# Patient Record
Sex: FEMALE | Race: WHITE | NOT HISPANIC OR LATINO | Employment: OTHER | URBAN - METROPOLITAN AREA
[De-identification: names, ages, dates, MRNs, and addresses within clinical notes are randomized per-mention and may not be internally consistent; named-entity substitution may affect disease eponyms.]

---

## 2017-04-11 ENCOUNTER — HOSPITAL ENCOUNTER (OUTPATIENT)
Dept: CARDIOLOGY | Facility: HOSPITAL | Age: 82
Discharge: HOME | End: 2017-04-11
Attending: INTERNAL MEDICINE | Admitting: INTERNAL MEDICINE

## 2017-09-28 ENCOUNTER — HOSPITAL ENCOUNTER (OUTPATIENT)
Dept: LAB | Facility: HOSPITAL | Age: 82
Discharge: HOME | End: 2017-09-28
Attending: FAMILY MEDICINE | Admitting: FAMILY MEDICINE

## 2017-10-31 ENCOUNTER — HOSPITAL ENCOUNTER (OUTPATIENT)
Dept: MAMMOGRAPHY | Facility: HOSPITAL | Age: 82
Discharge: HOME | End: 2017-10-31
Attending: FAMILY MEDICINE | Admitting: FAMILY MEDICINE

## 2018-02-27 ENCOUNTER — HOSPITAL ENCOUNTER (OUTPATIENT)
Dept: LAB | Facility: HOSPITAL | Age: 83
Setting detail: SPECIMEN
Discharge: HOME | End: 2018-02-27
Attending: FAMILY MEDICINE | Admitting: FAMILY MEDICINE

## 2018-02-28 ENCOUNTER — HOSPITAL ENCOUNTER (OUTPATIENT)
Dept: OTHER | Facility: HOSPITAL | Age: 83
Discharge: HOME | End: 2018-02-28
Attending: FAMILY MEDICINE | Admitting: FAMILY MEDICINE

## 2018-02-28 LAB
ALBUMIN SERPL-MCNC: 4 G/DL (ref 3.5–4.8)
ALBUMIN/GLOB SERPL: 1.5 {RATIO} (ref 1–1.7)
ALP SERPL-CCNC: 38 IU/L (ref 32–91)
ALT SERPL-CCNC: 16 IU/L (ref 14–54)
ANION GAP SERPL CALC-SCNC: 11.1 MMOL/L (ref 10–20)
AST SERPL-CCNC: 26 IU/L (ref 15–41)
BACTERIA SPEC AEROBE CULT: NORMAL
BILIRUB SERPL-MCNC: 0.8 MG/DL (ref 0.3–1.2)
BUN SERPL-MCNC: 18 MG/DL (ref 8–20)
BUN/CREAT SERPL: 15 (ref 5.4–26.2)
CALCIUM SERPL-MCNC: 9.1 MG/DL (ref 8.9–10.3)
CHLORIDE SERPL-SCNC: 104 MMOL/L (ref 101–111)
CHOLEST SERPL-MCNC: 205 MG/DL
CHOLEST/HDLC SERPL: 3.3 {RATIO}
CONV CO2: 27 MMOL/L (ref 22–32)
CONV LDL CHOLESTEROL DIRECT: 116 MG/DL (ref 0–100)
CONV TOTAL PROTEIN: 6.7 G/DL (ref 6.1–7.9)
CREAT UR-MCNC: 1.2 MG/DL (ref 0.4–1)
GLOBULIN UR ELPH-MCNC: 2.7 G/DL (ref 2.5–3.8)
GLUCOSE SERPL-MCNC: 94 MG/DL (ref 65–99)
HDLC SERPL-MCNC: 61 MG/DL
LDLC/HDLC SERPL: 1.9 {RATIO}
LIPID INTERPRETATION: ABNORMAL
Lab: NORMAL
MICRO REPORT STATUS: NORMAL
POTASSIUM SERPL-SCNC: 4.1 MMOL/L (ref 3.6–5.1)
SODIUM SERPL-SCNC: 138 MMOL/L (ref 136–144)
SPECIMEN SOURCE: NORMAL
TRIGL SERPL-MCNC: 167 MG/DL
VLDLC SERPL CALC-MCNC: 27.7 MG/DL

## 2018-04-24 ENCOUNTER — HOSPITAL ENCOUNTER (OUTPATIENT)
Dept: CARDIOLOGY | Facility: HOSPITAL | Age: 83
Discharge: HOME | End: 2018-04-24
Attending: INTERNAL MEDICINE | Admitting: INTERNAL MEDICINE

## 2018-09-05 ENCOUNTER — HOSPITAL ENCOUNTER (OUTPATIENT)
Dept: LAB | Facility: HOSPITAL | Age: 83
Discharge: HOME | End: 2018-09-05
Attending: FAMILY MEDICINE | Admitting: FAMILY MEDICINE

## 2018-09-05 LAB
ALBUMIN SERPL-MCNC: 3.9 G/DL (ref 3.5–4.8)
ALBUMIN/GLOB SERPL: 1.4 {RATIO} (ref 1–1.7)
ALP SERPL-CCNC: 39 IU/L (ref 32–91)
ALT SERPL-CCNC: 15 IU/L (ref 14–54)
ANION GAP SERPL CALC-SCNC: 14.3 MMOL/L (ref 10–20)
AST SERPL-CCNC: 26 IU/L (ref 15–41)
BASOPHILS # BLD AUTO: 0 10*3/UL (ref 0–0.2)
BASOPHILS NFR BLD AUTO: 1 % (ref 0–2)
BILIRUB SERPL-MCNC: 0.8 MG/DL (ref 0.3–1.2)
BUN SERPL-MCNC: 13 MG/DL (ref 8–20)
BUN/CREAT SERPL: 13 (ref 5.4–26.2)
CALCIUM SERPL-MCNC: 9.2 MG/DL (ref 8.9–10.3)
CHLORIDE SERPL-SCNC: 104 MMOL/L (ref 101–111)
CHOLEST SERPL-MCNC: 193 MG/DL
CHOLEST/HDLC SERPL: 3.2 {RATIO}
CONV CO2: 28 MMOL/L (ref 22–32)
CONV LDL CHOLESTEROL DIRECT: 106 MG/DL (ref 0–100)
CONV TOTAL PROTEIN: 6.7 G/DL (ref 6.1–7.9)
CREAT UR-MCNC: 1 MG/DL (ref 0.4–1)
DIFFERENTIAL METHOD BLD: (no result)
EOSINOPHIL # BLD AUTO: 0.1 10*3/UL (ref 0–0.3)
EOSINOPHIL # BLD AUTO: 2 % (ref 0–3)
ERYTHROCYTE [DISTWIDTH] IN BLOOD BY AUTOMATED COUNT: 14.7 % (ref 11.5–14.5)
GLOBULIN UR ELPH-MCNC: 2.8 G/DL (ref 2.5–3.8)
GLUCOSE SERPL-MCNC: 94 MG/DL (ref 65–99)
HCT VFR BLD AUTO: 36.1 % (ref 35–49)
HDLC SERPL-MCNC: 60 MG/DL
HGB BLD-MCNC: 11.9 G/DL (ref 12–15)
LDLC/HDLC SERPL: 1.8 {RATIO}
LIPID INTERPRETATION: ABNORMAL
LYMPHOCYTES # BLD AUTO: 1.5 10*3/UL (ref 0.8–4.8)
LYMPHOCYTES NFR BLD AUTO: 27 % (ref 18–42)
MCH RBC QN AUTO: 30.5 PG (ref 26–32)
MCHC RBC AUTO-ENTMCNC: 33.1 G/DL (ref 32–36)
MCV RBC AUTO: 92.3 FL (ref 80–94)
MONOCYTES # BLD AUTO: 0.5 10*3/UL (ref 0.1–1.3)
MONOCYTES NFR BLD AUTO: 9 % (ref 2–11)
NEUTROPHILS # BLD AUTO: 3.3 10*3/UL (ref 2.3–8.6)
NEUTROPHILS NFR BLD AUTO: 61 % (ref 50–75)
NRBC BLD AUTO-RTO: 0 /100{WBCS}
NRBC/RBC NFR BLD MANUAL: 0 10*3/UL
PLATELET # BLD AUTO: 215 10*3/UL (ref 150–450)
PMV BLD AUTO: 9.9 FL (ref 7.4–10.4)
POTASSIUM SERPL-SCNC: 4.3 MMOL/L (ref 3.6–5.1)
RBC # BLD AUTO: 3.91 10*6/UL (ref 4–5.4)
SODIUM SERPL-SCNC: 142 MMOL/L (ref 136–144)
TRIGL SERPL-MCNC: 133 MG/DL
VLDLC SERPL CALC-MCNC: 27.4 MG/DL
WBC # BLD AUTO: 5.4 10*3/UL (ref 4.5–11.5)

## 2019-04-12 ENCOUNTER — HOSPITAL ENCOUNTER (OUTPATIENT)
Dept: MAMMOGRAPHY | Facility: HOSPITAL | Age: 84
Discharge: HOME | End: 2019-04-12
Attending: NURSE PRACTITIONER | Admitting: NURSE PRACTITIONER

## 2019-08-07 ENCOUNTER — LAB (OUTPATIENT)
Dept: LAB | Facility: HOSPITAL | Age: 84
End: 2019-08-07

## 2019-08-07 ENCOUNTER — TRANSCRIBE ORDERS (OUTPATIENT)
Dept: LAB | Facility: HOSPITAL | Age: 84
End: 2019-08-07

## 2019-08-07 DIAGNOSIS — I10 PRIMARY HYPERTENSION: ICD-10-CM

## 2019-08-07 DIAGNOSIS — R09.89 ABNORMAL CHEST SOUNDS: ICD-10-CM

## 2019-08-07 DIAGNOSIS — E78.81 LIPOID DERMATOARTHRITIS: ICD-10-CM

## 2019-08-07 DIAGNOSIS — M85.80 OSTEOPENIA, UNSPECIFIED LOCATION: ICD-10-CM

## 2019-08-07 DIAGNOSIS — I21.3 MYOCARDIAL NECROSIS SYNDROME (HCC): ICD-10-CM

## 2019-08-07 DIAGNOSIS — M19.90 SENILE ARTHRITIS: ICD-10-CM

## 2019-08-07 DIAGNOSIS — E03.9 HYPOTHYROIDISM, UNSPECIFIED TYPE: Primary | ICD-10-CM

## 2019-08-07 DIAGNOSIS — E03.9 HYPOTHYROIDISM, UNSPECIFIED TYPE: ICD-10-CM

## 2019-08-07 LAB
25(OH)D3 SERPL-MCNC: 44.9 NG/ML (ref 30–100)
ALBUMIN SERPL-MCNC: 3.7 G/DL (ref 3.5–4.8)
ALBUMIN/GLOB SERPL: 1.4 G/DL (ref 1–1.7)
ALP SERPL-CCNC: 35 U/L (ref 32–91)
ALT SERPL W P-5'-P-CCNC: 14 U/L (ref 14–54)
ANION GAP SERPL CALCULATED.3IONS-SCNC: 13.1 MMOL/L (ref 5–15)
ARTICHOKE IGE QN: 112 MG/DL (ref 0–100)
AST SERPL-CCNC: 22 U/L (ref 15–41)
BASOPHILS # BLD AUTO: 0.1 10*3/MM3 (ref 0–0.2)
BASOPHILS NFR BLD AUTO: 1.7 % (ref 0–1.5)
BILIRUB SERPL-MCNC: 0.7 MG/DL (ref 0.3–1.2)
BUN BLD-MCNC: 13 MG/DL (ref 8–20)
BUN/CREAT SERPL: 11.8 (ref 5.4–26.2)
CALCIUM SPEC-SCNC: 8.6 MG/DL (ref 8.9–10.3)
CHLORIDE SERPL-SCNC: 103 MMOL/L (ref 101–111)
CHOLEST SERPL-MCNC: 190 MG/DL
CO2 SERPL-SCNC: 25 MMOL/L (ref 22–32)
CREAT BLD-MCNC: 1.1 MG/DL (ref 0.4–1)
DEPRECATED RDW RBC AUTO: 45.9 FL (ref 37–54)
EOSINOPHIL # BLD AUTO: 0.1 10*3/MM3 (ref 0–0.4)
EOSINOPHIL NFR BLD AUTO: 2.9 % (ref 0.3–6.2)
ERYTHROCYTE [DISTWIDTH] IN BLOOD BY AUTOMATED COUNT: 14.3 % (ref 12.3–15.4)
GFR SERPL CREATININE-BSD FRML MDRD: 48 ML/MIN/1.73
GLOBULIN UR ELPH-MCNC: 2.6 GM/DL (ref 2.5–3.8)
GLUCOSE BLD-MCNC: 97 MG/DL (ref 65–99)
HCT VFR BLD AUTO: 37.6 % (ref 34–46.6)
HDLC SERPL QL: 3.96
HDLC SERPL-MCNC: 48 MG/DL
HGB BLD-MCNC: 12.2 G/DL (ref 12–15.9)
LDLC/HDLC SERPL: 2.3 {RATIO}
LYMPHOCYTES # BLD AUTO: 1.9 10*3/MM3 (ref 0.7–3.1)
LYMPHOCYTES NFR BLD AUTO: 37.1 % (ref 19.6–45.3)
MCH RBC QN AUTO: 30 PG (ref 26.6–33)
MCHC RBC AUTO-ENTMCNC: 32.6 G/DL (ref 31.5–35.7)
MCV RBC AUTO: 91.9 FL (ref 79–97)
MONOCYTES # BLD AUTO: 0.6 10*3/MM3 (ref 0.1–0.9)
MONOCYTES NFR BLD AUTO: 12.2 % (ref 5–12)
NEUTROPHILS # BLD AUTO: 2.3 10*3/MM3 (ref 1.7–7)
NEUTROPHILS NFR BLD AUTO: 46.1 % (ref 42.7–76)
NRBC BLD AUTO-RTO: 0.2 /100 WBC (ref 0–0.2)
PLATELET # BLD AUTO: 218 10*3/MM3 (ref 140–450)
PMV BLD AUTO: 10.2 FL (ref 6–12)
POTASSIUM BLD-SCNC: 4.1 MMOL/L (ref 3.6–5.1)
PROT SERPL-MCNC: 6.3 G/DL (ref 6.1–7.9)
RBC # BLD AUTO: 4.09 10*6/MM3 (ref 3.77–5.28)
SODIUM BLD-SCNC: 137 MMOL/L (ref 136–144)
TRIGL SERPL-MCNC: 158 MG/DL
TSH SERPL DL<=0.05 MIU/L-ACNC: 0.44 MIU/ML (ref 0.34–5.6)
VLDLC SERPL-MCNC: 31.6 MG/DL
WBC NRBC COR # BLD: 5 10*3/MM3 (ref 3.4–10.8)

## 2019-08-07 PROCEDURE — 85025 COMPLETE CBC W/AUTO DIFF WBC: CPT | Performed by: FAMILY MEDICINE

## 2019-08-07 PROCEDURE — 80053 COMPREHEN METABOLIC PANEL: CPT | Performed by: FAMILY MEDICINE

## 2019-08-07 PROCEDURE — 80061 LIPID PANEL: CPT | Performed by: FAMILY MEDICINE

## 2019-08-07 PROCEDURE — 82306 VITAMIN D 25 HYDROXY: CPT | Performed by: FAMILY MEDICINE

## 2019-08-07 PROCEDURE — 36415 COLL VENOUS BLD VENIPUNCTURE: CPT

## 2019-08-07 PROCEDURE — 84443 ASSAY THYROID STIM HORMONE: CPT | Performed by: FAMILY MEDICINE

## 2019-12-20 PROBLEM — M81.0 OSTEOPOROSIS: Status: ACTIVE | Noted: 2019-12-20

## 2019-12-20 PROBLEM — J45.909 ASTHMA: Status: ACTIVE | Noted: 2019-12-20

## 2019-12-20 PROBLEM — E78.5 HYPERLIPIDEMIA: Status: ACTIVE | Noted: 2019-12-20

## 2019-12-20 PROBLEM — E03.9 HYPOTHYROIDISM: Status: ACTIVE | Noted: 2019-12-20

## 2019-12-20 PROBLEM — K21.9 GASTROESOPHAGEAL REFLUX DISEASE: Status: ACTIVE | Noted: 2019-12-20
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
## 2019-12-20 PROBLEM — I10 HYPERTENSION: Status: ACTIVE | Noted: 2019-12-20

## 2019-12-20 RX ORDER — TRAMADOL HYDROCHLORIDE 50 MG/1
TABLET ORAL
COMMUNITY
Start: 2019-04-08 | End: 2020-01-06

## 2020-01-06 ENCOUNTER — OFFICE VISIT (OUTPATIENT)
Dept: CARDIOLOGY | Facility: CLINIC | Age: 85
End: 2020-01-06

## 2020-01-06 VITALS
HEIGHT: 55 IN | SYSTOLIC BLOOD PRESSURE: 175 MMHG | DIASTOLIC BLOOD PRESSURE: 81 MMHG | BODY MASS INDEX: 28 KG/M2 | WEIGHT: 121 LBS | OXYGEN SATURATION: 99 % | HEART RATE: 61 BPM

## 2020-01-06 DIAGNOSIS — E78.2 MIXED HYPERLIPIDEMIA: Primary | ICD-10-CM

## 2020-01-06 DIAGNOSIS — I10 ESSENTIAL HYPERTENSION: ICD-10-CM

## 2020-01-06 DIAGNOSIS — Z98.61 STATUS POST PERCUTANEOUS TRANSLUMINAL CORONARY ANGIOPLASTY: ICD-10-CM

## 2020-01-06 DIAGNOSIS — E03.9 ACQUIRED HYPOTHYROIDISM: ICD-10-CM

## 2020-01-06 PROCEDURE — 99214 OFFICE O/P EST MOD 30 MIN: CPT | Performed by: INTERNAL MEDICINE

## 2020-01-06 PROCEDURE — 93000 ELECTROCARDIOGRAM COMPLETE: CPT | Performed by: INTERNAL MEDICINE

## 2020-01-06 RX ORDER — RALOXIFENE HYDROCHLORIDE 60 MG/1
1 TABLET, FILM COATED ORAL DAILY
COMMUNITY
Start: 2019-11-23

## 2020-01-06 NOTE — PROGRESS NOTES
Encounter Date:01/06/2020  Last seen 6/12/2019    Patient ID: Sol Milton is a 84 y.o. female.    Chief Complaint:  Status post stent  Hypertension  Hypothyroidism      History of Present Illness  Since I have last seen, the patient has been without any chest discomfort ,shortness of breath, palpitations, dizziness or syncope.  Denies having any headache ,abdominal pain ,nausea, vomiting , diarrhea constipation, loss of weight or loss of appetite.  Denies having any excessive bruising ,hematuria or blood in the stool.    Review of all systems negative except as indicated    Assessment and Plan       ////////////////////////  Impression  -------------   - status post stent to RCA for totally occluded vessel level in 10/2009.       History of proximal inferior wall hypokinesis with ejection fraction of 55-60%.  60% mid LAD distal to diagonal branch 50-60% mid circumflex 60% ostial marginal and 60% distal circumflex coronary artery disease.      stress Cardiolite test 04/24/2018 showed mild apical infarction without ischemia     -hypothyroidism hypertension GERD and asthma     -allergy to penicillin     -family history of coronary artery disease.  -----------------    Plan  ------------  Patient is not having any angina pectoris or congestive heart failure.   EKG showed sinus rhythm without any ischemic changes  Medications were reviewed and updated.  Follow-up in the office in  6 months  ///////////           Diagnosis Plan   1. Mixed hyperlipidemia  ECG 12 Lead   2. Essential hypertension  ECG 12 Lead   3. Acquired hypothyroidism  ECG 12 Lead   4. Status post percutaneous transluminal coronary angioplasty  ECG 12 Lead   LAB RESULTS (LAST 7 DAYS)    CBC        BMP        CMP         BNP        TROPONIN        CoAg        Creatinine Clearance  CrCl cannot be calculated (Patient's most recent lab result is older than the maximum 30 days allowed.).    ABG        Radiology  No radiology results for the last  day                The following portions of the patient's history were reviewed and updated as appropriate: allergies, current medications, past family history, past medical history, past social history, past surgical history and problem list.    Review of Systems   Constitution: Negative for chills, fever and malaise/fatigue.   Cardiovascular: Negative for chest pain, dyspnea on exertion, leg swelling, palpitations and syncope.   Respiratory: Negative for shortness of breath.    Skin: Negative for rash.   Neurological: Negative for dizziness, light-headedness and numbness.         Current Outpatient Medications:   •  amLODIPine (NORVASC) 10 MG tablet, , Disp: , Rfl:   •  atenolol (TENORMIN) 25 MG tablet, , Disp: , Rfl:   •  Cholecalciferol (VITAMIN D3) 5000 units capsule capsule, VITAMIN D3 5000 UNIT CAPS, Disp: , Rfl:   •  clopidogrel (PLAVIX) 75 MG tablet, , Disp: , Rfl:   •  ezetimibe (ZETIA) 10 MG tablet, , Disp: , Rfl:   •  levothyroxine (SYNTHROID) 88 MCG tablet, SYNTHROID 88 MCG TABS, Disp: , Rfl:   •  Loratadine (CLARITIN) 10 MG capsule, CLARITIN 10 MG CAPS, Disp: , Rfl:   •  Multiple Vitamins-Minerals (MULTI VITAMIN/MINERALS) tablet, MULTI VITAMIN/MINERALS TABS, Disp: , Rfl:   •  raloxifene (EVISTA) 60 MG tablet, Take 1 tablet by mouth Daily., Disp: , Rfl:   •  triamcinolone (KENALOG) 0.1 % ointment, Apply  topically to the appropriate area as directed 2 (Two) Times a Day., Disp: 30 g, Rfl: 0    Allergies   Allergen Reactions   • Penicillin G Rash       No family history on file.    Past Surgical History:   Procedure Laterality Date   • CARDIAC SURGERY     • EYE SURGERY         Past Medical History:   Diagnosis Date   • Disease of thyroid gland    • Hyperlipidemia    • Hypertension        No family history on file.    Social History     Socioeconomic History   • Marital status:      Spouse name: Not on file   • Number of children: Not on file   • Years of education: Not on file   • Highest  "education level: Not on file   Tobacco Use   • Smoking status: Never Smoker   • Smokeless tobacco: Never Used   Substance and Sexual Activity   • Alcohol use: No     Frequency: Never           ECG 12 Lead  Date/Time: 1/6/2020 12:13 PM  Performed by: Valdo Atkins MD  Authorized by: Valdo Atkins MD   Comparison: compared with previous ECG   Similar to previous ECG  Comments: Normal sinus rhythm premature atrial contractions 60/min nonspecific ST-T wave changes normal axis normal intervals no other ectopy.  No significant change from previous EKG              Objective:       Physical Exam    /81 (BP Location: Left arm, Patient Position: Sitting, Cuff Size: Adult)   Pulse 61   Ht 129.5 cm (51\")   Wt 54.9 kg (121 lb)   SpO2 99%   BMI 32.71 kg/m²   The patient is alert, oriented and in no distress.    Vital signs as noted above.    Head and neck revealed no carotid bruits or jugular venous distension.  No thyromegaly or lymphadenopathy is present.    Lungs clear.  No wheezing.  Breath sounds are normal bilaterally.    Heart normal first and second heart sounds.  No murmur..  No pericardial rub is present.  No gallop is present.    Abdomen soft and nontender.  No organomegaly is present.    Extremities revealed good peripheral pulses without any pedal edema.    Skin warm and dry.    Musculoskeletal system is grossly normal.    CNS grossly normal.        "

## 2020-06-12 ENCOUNTER — LAB (OUTPATIENT)
Dept: LAB | Facility: HOSPITAL | Age: 85
End: 2020-06-12

## 2020-06-12 ENCOUNTER — TRANSCRIBE ORDERS (OUTPATIENT)
Dept: LAB | Facility: HOSPITAL | Age: 85
End: 2020-06-12

## 2020-06-12 DIAGNOSIS — E55.9 VITAMIN D INSUFFICIENCY: ICD-10-CM

## 2020-06-12 DIAGNOSIS — E78.81 LIPOID DERMATOARTHRITIS: Primary | ICD-10-CM

## 2020-06-12 DIAGNOSIS — N81.89 OLD LACERATION OF MUSCLES OF PELVIC FLOOR: ICD-10-CM

## 2020-06-12 DIAGNOSIS — I25.10 DISEASE OF CARDIOVASCULAR SYSTEM: ICD-10-CM

## 2020-06-12 DIAGNOSIS — I10 HYPERTENSION, UNSPECIFIED TYPE: ICD-10-CM

## 2020-06-12 DIAGNOSIS — E78.81 LIPOID DERMATOARTHRITIS: ICD-10-CM

## 2020-06-12 LAB
25(OH)D3 SERPL-MCNC: 78 NG/ML (ref 30–100)
ALBUMIN SERPL-MCNC: 4 G/DL (ref 3.5–5.2)
ALBUMIN/GLOB SERPL: 1.4 G/DL
ALP SERPL-CCNC: 42 U/L (ref 39–117)
ALT SERPL W P-5'-P-CCNC: 11 U/L (ref 1–33)
ANION GAP SERPL CALCULATED.3IONS-SCNC: 8.1 MMOL/L (ref 5–15)
AST SERPL-CCNC: 22 U/L (ref 1–32)
BASOPHILS # BLD AUTO: 0.08 10*3/MM3 (ref 0–0.2)
BASOPHILS NFR BLD AUTO: 1.4 % (ref 0–1.5)
BILIRUB SERPL-MCNC: 0.4 MG/DL (ref 0.2–1.2)
BUN BLD-MCNC: 21 MG/DL (ref 8–23)
BUN/CREAT SERPL: 20 (ref 7–25)
CALCIUM SPEC-SCNC: 9.1 MG/DL (ref 8.6–10.5)
CHLORIDE SERPL-SCNC: 106 MMOL/L (ref 98–107)
CHOLEST SERPL-MCNC: 173 MG/DL (ref 0–200)
CO2 SERPL-SCNC: 26.9 MMOL/L (ref 22–29)
CREAT BLD-MCNC: 1.05 MG/DL (ref 0.57–1)
DEPRECATED RDW RBC AUTO: 45.6 FL (ref 37–54)
EOSINOPHIL # BLD AUTO: 0.19 10*3/MM3 (ref 0–0.4)
EOSINOPHIL NFR BLD AUTO: 3.2 % (ref 0.3–6.2)
ERYTHROCYTE [DISTWIDTH] IN BLOOD BY AUTOMATED COUNT: 13.6 % (ref 12.3–15.4)
GFR SERPL CREATININE-BSD FRML MDRD: 50 ML/MIN/1.73
GLOBULIN UR ELPH-MCNC: 2.8 GM/DL
GLUCOSE BLD-MCNC: 96 MG/DL (ref 65–99)
HCT VFR BLD AUTO: 36.1 % (ref 34–46.6)
HDLC SERPL-MCNC: 71 MG/DL (ref 40–60)
HGB BLD-MCNC: 11.9 G/DL (ref 12–15.9)
IMM GRANULOCYTES # BLD AUTO: 0.02 10*3/MM3 (ref 0–0.05)
IMM GRANULOCYTES NFR BLD AUTO: 0.3 % (ref 0–0.5)
LDLC SERPL CALC-MCNC: 88 MG/DL (ref 0–100)
LDLC/HDLC SERPL: 1.24 {RATIO}
LYMPHOCYTES # BLD AUTO: 2.01 10*3/MM3 (ref 0.7–3.1)
LYMPHOCYTES NFR BLD AUTO: 34.4 % (ref 19.6–45.3)
MCH RBC QN AUTO: 29.8 PG (ref 26.6–33)
MCHC RBC AUTO-ENTMCNC: 33 G/DL (ref 31.5–35.7)
MCV RBC AUTO: 90.3 FL (ref 79–97)
MONOCYTES # BLD AUTO: 0.62 10*3/MM3 (ref 0.1–0.9)
MONOCYTES NFR BLD AUTO: 10.6 % (ref 5–12)
NEUTROPHILS # BLD AUTO: 2.93 10*3/MM3 (ref 1.7–7)
NEUTROPHILS NFR BLD AUTO: 50.1 % (ref 42.7–76)
NRBC BLD AUTO-RTO: 0.2 /100 WBC (ref 0–0.2)
PLATELET # BLD AUTO: 217 10*3/MM3 (ref 140–450)
PMV BLD AUTO: 12.2 FL (ref 6–12)
POTASSIUM BLD-SCNC: 4.6 MMOL/L (ref 3.5–5.2)
PROT SERPL-MCNC: 6.8 G/DL (ref 6–8.5)
RBC # BLD AUTO: 4 10*6/MM3 (ref 3.77–5.28)
SODIUM BLD-SCNC: 141 MMOL/L (ref 136–145)
TRIGL SERPL-MCNC: 69 MG/DL (ref 0–150)
TSH SERPL DL<=0.05 MIU/L-ACNC: 0.26 UIU/ML (ref 0.27–4.2)
VLDLC SERPL-MCNC: 13.8 MG/DL (ref 5–40)
WBC NRBC COR # BLD: 5.85 10*3/MM3 (ref 3.4–10.8)

## 2020-06-12 PROCEDURE — 36415 COLL VENOUS BLD VENIPUNCTURE: CPT

## 2020-06-12 PROCEDURE — 80053 COMPREHEN METABOLIC PANEL: CPT

## 2020-06-12 PROCEDURE — 80061 LIPID PANEL: CPT

## 2020-06-12 PROCEDURE — 82306 VITAMIN D 25 HYDROXY: CPT

## 2020-06-12 PROCEDURE — 85025 COMPLETE CBC W/AUTO DIFF WBC: CPT

## 2020-06-12 PROCEDURE — 84443 ASSAY THYROID STIM HORMONE: CPT

## 2020-07-06 ENCOUNTER — OFFICE VISIT (OUTPATIENT)
Dept: CARDIOLOGY | Facility: CLINIC | Age: 85
End: 2020-07-06

## 2020-07-06 VITALS
SYSTOLIC BLOOD PRESSURE: 151 MMHG | BODY MASS INDEX: 27.89 KG/M2 | WEIGHT: 120.5 LBS | HEIGHT: 55 IN | DIASTOLIC BLOOD PRESSURE: 81 MMHG | OXYGEN SATURATION: 97 % | HEART RATE: 62 BPM

## 2020-07-06 DIAGNOSIS — Z95.820 STATUS POST ANGIOPLASTY WITH STENT: Primary | ICD-10-CM

## 2020-07-06 DIAGNOSIS — E03.9 ACQUIRED HYPOTHYROIDISM: ICD-10-CM

## 2020-07-06 DIAGNOSIS — I10 ESSENTIAL HYPERTENSION: ICD-10-CM

## 2020-07-06 DIAGNOSIS — E78.2 MIXED HYPERLIPIDEMIA: ICD-10-CM

## 2020-07-06 DIAGNOSIS — Z98.61 STATUS POST PERCUTANEOUS TRANSLUMINAL CORONARY ANGIOPLASTY: ICD-10-CM

## 2020-07-06 PROCEDURE — 99213 OFFICE O/P EST LOW 20 MIN: CPT | Performed by: INTERNAL MEDICINE

## 2020-07-06 PROCEDURE — 93000 ELECTROCARDIOGRAM COMPLETE: CPT | Performed by: INTERNAL MEDICINE

## 2020-07-06 NOTE — PROGRESS NOTES
Encounter Date:07/06/2020  Last seen 1/6/2020      Patient ID: Sol Milton is a 85 y.o. female.    Chief Complaint:  Status post stent  Hypertension  Hypothyroidism        History of Present Illness    Since I have last seen, the patient has been without any chest discomfort ,shortness of breath, palpitations, dizziness or syncope.  Denies having any headache ,abdominal pain ,nausea, vomiting , diarrhea constipation, loss of weight or loss of appetite.  Denies having any excessive bruising ,hematuria or blood in the stool.     Review of all systems negative except as indicated     Assessment and Plan         ////////////////////////  Impression  -------------   - status post stent to RCA for totally occluded vessel level in 10/2009.       History of proximal inferior wall hypokinesis with ejection fraction of 55-60%.  60% mid LAD distal to diagonal branch 50-60% mid circumflex 60% ostial marginal and 60% distal circumflex coronary artery disease.      stress Cardiolite test 04/24/2018 showed mild apical infarction without ischemia     -hypothyroidism hypertension GERD and asthma     -allergy to penicillin     -family history of coronary artery disease.  -----------------    Plan  ------------  Patient is not having any angina pectoris or congestive heart failure.  EKG showed sinus rhythm without any ischemic changes  Medications were reviewed and updated.  Follow-up in the office in  6 months.  Further plan will depend on patient's progress.  ///////////                   Diagnosis Plan   1. Status post angioplasty with stent  ECG 12 Lead   2. Mixed hyperlipidemia     3. Essential hypertension     4. Acquired hypothyroidism     5. Status post percutaneous transluminal coronary angioplasty     LAB RESULTS (LAST 7 DAYS)    CBC        BMP        CMP         BNP        TROPONIN        CoAg        Creatinine Clearance  Estimated Creatinine Clearance: 28 mL/min (A) (by C-G formula based on SCr of 1.05 mg/dL  (H)).    ABG        Radiology  No radiology results for the last day                The following portions of the patient's history were reviewed and updated as appropriate: allergies, current medications, past family history, past medical history, past social history, past surgical history and problem list.    Review of Systems   Constitution: Positive for malaise/fatigue.   Cardiovascular: Negative for chest pain, leg swelling, palpitations and syncope.   Respiratory: Negative for shortness of breath.    Skin: Negative for rash.   Gastrointestinal: Negative for nausea and vomiting.   Neurological: Negative for dizziness, light-headedness and numbness.         Current Outpatient Medications:   •  amLODIPine (NORVASC) 10 MG tablet, , Disp: , Rfl:   •  atenolol (TENORMIN) 25 MG tablet, , Disp: , Rfl:   •  Cholecalciferol (VITAMIN D3) 5000 units capsule capsule, VITAMIN D3 5000 UNIT CAPS, Disp: , Rfl:   •  clopidogrel (PLAVIX) 75 MG tablet, , Disp: , Rfl:   •  ezetimibe (ZETIA) 10 MG tablet, , Disp: , Rfl:   •  levothyroxine (SYNTHROID) 88 MCG tablet, SYNTHROID 88 MCG TABS, Disp: , Rfl:   •  Loratadine (CLARITIN) 10 MG capsule, CLARITIN 10 MG CAPS, Disp: , Rfl:   •  Multiple Vitamins-Minerals (MULTI VITAMIN/MINERALS) tablet, MULTI VITAMIN/MINERALS TABS, Disp: , Rfl:   •  raloxifene (EVISTA) 60 MG tablet, Take 1 tablet by mouth Daily., Disp: , Rfl:   •  triamcinolone (KENALOG) 0.1 % ointment, Apply  topically to the appropriate area as directed 2 (Two) Times a Day., Disp: 30 g, Rfl: 0    Allergies   Allergen Reactions   • Penicillin G Rash       History reviewed. No pertinent family history.    Past Surgical History:   Procedure Laterality Date   • CARDIAC SURGERY     • EYE SURGERY         Past Medical History:   Diagnosis Date   • Disease of thyroid gland    • Hyperlipidemia    • Hypertension        History reviewed. No pertinent family history.    Social History     Socioeconomic History   • Marital status:   "    Spouse name: Not on file   • Number of children: Not on file   • Years of education: Not on file   • Highest education level: Not on file   Tobacco Use   • Smoking status: Never Smoker   • Smokeless tobacco: Never Used   Substance and Sexual Activity   • Alcohol use: No     Frequency: Never           ECG 12 Lead  Date/Time: 7/6/2020 12:48 PM  Performed by: Valdo Atkins MD  Authorized by: Valdo Atkins MD   Comparison: compared with previous ECG   Similar to previous ECG  Comments: Normal sinus rhythm nonspecific ST-T wave changes premature atrial contractions 63/min normal axis normal intervals no change from 1/6/2020              Objective:       Physical Exam    /81   Pulse 62   Ht 129.5 cm (51\")   Wt 54.7 kg (120 lb 8 oz)   SpO2 97%   BMI 32.57 kg/m²   The patient is alert, oriented and in no distress.    Vital signs as noted above.    Head and neck revealed no carotid bruits or jugular venous distension.  No thyromegaly or lymphadenopathy is present.    Lungs clear.  No wheezing.  Breath sounds are normal bilaterally.    Heart normal first and second heart sounds.  No murmur..  No pericardial rub is present.  No gallop is present.    Abdomen soft and nontender.  No organomegaly is present.    Extremities revealed good peripheral pulses without any pedal edema.    Skin warm and dry.    Musculoskeletal system is grossly normal.    CNS grossly normal.        "

## 2021-01-19 ENCOUNTER — TRANSCRIBE ORDERS (OUTPATIENT)
Dept: LAB | Facility: HOSPITAL | Age: 86
End: 2021-01-19

## 2021-01-19 ENCOUNTER — LAB (OUTPATIENT)
Dept: LAB | Facility: HOSPITAL | Age: 86
End: 2021-01-19

## 2021-01-19 DIAGNOSIS — E55.9 VITAMIN D INSUFFICIENCY: ICD-10-CM

## 2021-01-19 DIAGNOSIS — E78.41 ELEVATED LIPOPROTEIN A LEVEL: Primary | ICD-10-CM

## 2021-01-19 DIAGNOSIS — N18.30 STAGE 3 CHRONIC KIDNEY DISEASE, UNSPECIFIED WHETHER STAGE 3A OR 3B CKD (HCC): ICD-10-CM

## 2021-01-19 DIAGNOSIS — I25.10 ATHEROSCLEROSIS OF NATIVE CORONARY ARTERY, ANGINA PRESENCE UNSPECIFIED, UNSPECIFIED WHETHER NATIVE OR TRANSPLANTED HEART: ICD-10-CM

## 2021-01-19 DIAGNOSIS — E78.81 LIPOID DERMATOARTHRITIS: Primary | ICD-10-CM

## 2021-01-19 DIAGNOSIS — I10 ESSENTIAL HYPERTENSION, MALIGNANT: ICD-10-CM

## 2021-01-19 DIAGNOSIS — M19.90 SENILE ARTHRITIS: ICD-10-CM

## 2021-01-19 DIAGNOSIS — E78.41 ELEVATED LIPOPROTEIN A LEVEL: ICD-10-CM

## 2021-01-19 LAB
25(OH)D3 SERPL-MCNC: 68.9 NG/ML (ref 30–100)
ALBUMIN SERPL-MCNC: 4.3 G/DL (ref 3.5–5.2)
ALBUMIN/GLOB SERPL: 1.3 G/DL
ALP SERPL-CCNC: 57 U/L (ref 39–117)
ALT SERPL W P-5'-P-CCNC: 14 U/L (ref 1–33)
ANION GAP SERPL CALCULATED.3IONS-SCNC: 9.6 MMOL/L (ref 5–15)
AST SERPL-CCNC: 22 U/L (ref 1–32)
BASOPHILS # BLD AUTO: 0.02 10*3/MM3 (ref 0–0.2)
BASOPHILS NFR BLD AUTO: 0.4 % (ref 0–1.5)
BILIRUB SERPL-MCNC: 0.6 MG/DL (ref 0–1.2)
BUN SERPL-MCNC: 13 MG/DL (ref 8–23)
BUN/CREAT SERPL: 18.1 (ref 7–25)
CALCIUM SPEC-SCNC: 9.3 MG/DL (ref 8.6–10.5)
CHLORIDE SERPL-SCNC: 100 MMOL/L (ref 98–107)
CHOLEST SERPL-MCNC: 153 MG/DL (ref 0–200)
CO2 SERPL-SCNC: 28.4 MMOL/L (ref 22–29)
CREAT SERPL-MCNC: 0.72 MG/DL (ref 0.57–1)
DEPRECATED RDW RBC AUTO: 47.2 FL (ref 37–54)
EOSINOPHIL # BLD AUTO: 0.07 10*3/MM3 (ref 0–0.4)
EOSINOPHIL NFR BLD AUTO: 1.3 % (ref 0.3–6.2)
ERYTHROCYTE [DISTWIDTH] IN BLOOD BY AUTOMATED COUNT: 13.8 % (ref 12.3–15.4)
GFR SERPL CREATININE-BSD FRML MDRD: 77 ML/MIN/1.73
GLOBULIN UR ELPH-MCNC: 3.3 GM/DL
GLUCOSE SERPL-MCNC: 97 MG/DL (ref 65–99)
HCT VFR BLD AUTO: 37.7 % (ref 34–46.6)
HDLC SERPL-MCNC: 70 MG/DL (ref 40–60)
HGB BLD-MCNC: 12.3 G/DL (ref 12–15.9)
IMM GRANULOCYTES # BLD AUTO: 0.05 10*3/MM3 (ref 0–0.05)
IMM GRANULOCYTES NFR BLD AUTO: 1 % (ref 0–0.5)
LDLC SERPL CALC-MCNC: 71 MG/DL (ref 0–100)
LDLC/HDLC SERPL: 1.02 {RATIO}
LYMPHOCYTES # BLD AUTO: 1.76 10*3/MM3 (ref 0.7–3.1)
LYMPHOCYTES NFR BLD AUTO: 33.8 % (ref 19.6–45.3)
MCH RBC QN AUTO: 30.4 PG (ref 26.6–33)
MCHC RBC AUTO-ENTMCNC: 32.6 G/DL (ref 31.5–35.7)
MCV RBC AUTO: 93.1 FL (ref 79–97)
MONOCYTES # BLD AUTO: 0.6 10*3/MM3 (ref 0.1–0.9)
MONOCYTES NFR BLD AUTO: 11.5 % (ref 5–12)
NEUTROPHILS NFR BLD AUTO: 2.7 10*3/MM3 (ref 1.7–7)
NEUTROPHILS NFR BLD AUTO: 52 % (ref 42.7–76)
NRBC BLD AUTO-RTO: 0 /100 WBC (ref 0–0.2)
PLATELET # BLD AUTO: 155 10*3/MM3 (ref 140–450)
PMV BLD AUTO: 11.6 FL (ref 6–12)
POTASSIUM SERPL-SCNC: 4.7 MMOL/L (ref 3.5–5.2)
PROT SERPL-MCNC: 7.6 G/DL (ref 6–8.5)
RBC # BLD AUTO: 4.05 10*6/MM3 (ref 3.77–5.28)
SODIUM SERPL-SCNC: 138 MMOL/L (ref 136–145)
TRIGL SERPL-MCNC: 57 MG/DL (ref 0–150)
TSH SERPL DL<=0.05 MIU/L-ACNC: 3.47 UIU/ML (ref 0.27–4.2)
VLDLC SERPL-MCNC: 12 MG/DL (ref 5–40)
WBC # BLD AUTO: 5.2 10*3/MM3 (ref 3.4–10.8)

## 2021-01-19 PROCEDURE — 36415 COLL VENOUS BLD VENIPUNCTURE: CPT

## 2021-01-19 PROCEDURE — 84443 ASSAY THYROID STIM HORMONE: CPT

## 2021-01-19 PROCEDURE — 82306 VITAMIN D 25 HYDROXY: CPT

## 2021-01-19 PROCEDURE — 85025 COMPLETE CBC W/AUTO DIFF WBC: CPT

## 2021-01-19 PROCEDURE — 80061 LIPID PANEL: CPT

## 2021-01-19 PROCEDURE — 80053 COMPREHEN METABOLIC PANEL: CPT

## 2021-03-09 ENCOUNTER — OFFICE VISIT (OUTPATIENT)
Dept: CARDIOLOGY | Facility: CLINIC | Age: 86
End: 2021-03-09

## 2021-03-09 VITALS
WEIGHT: 126 LBS | SYSTOLIC BLOOD PRESSURE: 148 MMHG | HEART RATE: 66 BPM | BODY MASS INDEX: 29.16 KG/M2 | DIASTOLIC BLOOD PRESSURE: 75 MMHG | OXYGEN SATURATION: 99 % | HEIGHT: 55 IN

## 2021-03-09 DIAGNOSIS — E78.2 MIXED HYPERLIPIDEMIA: ICD-10-CM

## 2021-03-09 DIAGNOSIS — Z95.820 STATUS POST ANGIOPLASTY WITH STENT: Primary | ICD-10-CM

## 2021-03-09 DIAGNOSIS — E03.9 ACQUIRED HYPOTHYROIDISM: ICD-10-CM

## 2021-03-09 DIAGNOSIS — I10 ESSENTIAL HYPERTENSION: ICD-10-CM

## 2021-03-09 PROCEDURE — 93000 ELECTROCARDIOGRAM COMPLETE: CPT | Performed by: INTERNAL MEDICINE

## 2021-03-09 PROCEDURE — 99214 OFFICE O/P EST MOD 30 MIN: CPT | Performed by: INTERNAL MEDICINE

## 2021-03-09 NOTE — PROGRESS NOTES
Encounter Date:03/09/2021  Last seen 7/6/2020      Patient ID: Sol Milton is a 86 y.o. female.    Chief Complaint:    Status post stent  Hypertension  Hypothyroidism        History of Present Illness  Mild fatigue.  Since I have last seen, the patient has been without any chest discomfort ,shortness of breath, palpitations, dizziness or syncope.  Denies having any headache ,abdominal pain ,nausea, vomiting , diarrhea constipation, loss of weight or loss of appetite.  Denies having any excessive bruising ,hematuria or blood in the stool.    Review of all systems negative except as indicated.    Reviewed ROS.  Assessment and Plan         ////////////////////////  Impression  -------------   - status post stent to RCA for totally occluded vessel level in 10/2009.       History of proximal inferior wall hypokinesis with ejection fraction of 55-60%.  60% mid LAD distal to diagonal branch 50-60% mid circumflex 60% ostial marginal and 60% distal circumflex coronary artery disease.      stress Cardiolite test 04/24/2018 showed mild apical infarction without ischemia     -hypothyroidism hypertension GERD and asthma     -allergy to penicillin     -family history of coronary artery disease.  -----------------    Plan  ------------  Status post stent  Patient is not having any angina pectoris or congestive heart failure.  EKG showed sinus rhythm without any ischemic changes.    Chronic coronary artery disease-stable  Continue Plavix    Hypertension-148/75  Continue atenolol amlodipine    Hypothyroidism-continue levothyroxine    Recent labs were reviewed and summarized-1/19/2021  Cholesterol 153 HDL 70 LDL 71 TSH is normal normal CBC and CMP.    Medications were reviewed and updated.  Follow-up in the office in  6 months.  Further plan will depend on patient's progress.  ///////////                    Diagnosis Plan   1. Status post angioplasty with stent  ECG 12 Lead   2. Mixed hyperlipidemia  ECG 12 Lead   3. Essential  hypertension  ECG 12 Lead   4. Acquired hypothyroidism  ECG 12 Lead   LAB RESULTS (LAST 7 DAYS)    CBC        BMP        CMP         BNP        TROPONIN        CoAg        Creatinine Clearance  CrCl cannot be calculated (Patient's most recent lab result is older than the maximum 30 days allowed.).    ABG        Radiology  No radiology results for the last day                The following portions of the patient's history were reviewed and updated as appropriate: allergies, current medications, past family history, past medical history, past social history, past surgical history and problem list.    Review of Systems   Constitutional: Positive for malaise/fatigue.   Cardiovascular: Negative for chest pain, leg swelling, palpitations and syncope.   Respiratory: Negative for shortness of breath.    Skin: Negative for rash.   Gastrointestinal: Negative for nausea and vomiting.   Neurological: Negative for dizziness, light-headedness and numbness.         Current Outpatient Medications:   •  amLODIPine (NORVASC) 10 MG tablet, , Disp: , Rfl:   •  atenolol (TENORMIN) 25 MG tablet, , Disp: , Rfl:   •  Cholecalciferol (VITAMIN D3) 5000 units capsule capsule, VITAMIN D3 5000 UNIT CAPS, Disp: , Rfl:   •  clopidogrel (PLAVIX) 75 MG tablet, , Disp: , Rfl:   •  ezetimibe (ZETIA) 10 MG tablet, , Disp: , Rfl:   •  levothyroxine (SYNTHROID) 88 MCG tablet, SYNTHROID 88 MCG TABS, Disp: , Rfl:   •  Loratadine (CLARITIN) 10 MG capsule, CLARITIN 10 MG CAPS, Disp: , Rfl:   •  Multiple Vitamins-Minerals (MULTI VITAMIN/MINERALS) tablet, MULTI VITAMIN/MINERALS TABS, Disp: , Rfl:   •  raloxifene (EVISTA) 60 MG tablet, Take 1 tablet by mouth Daily., Disp: , Rfl:   •  triamcinolone (KENALOG) 0.1 % ointment, Apply  topically to the appropriate area as directed 2 (Two) Times a Day., Disp: 30 g, Rfl: 0    Allergies   Allergen Reactions   • Penicillin G Rash       History reviewed. No pertinent family history.    Past Surgical History:   Procedure  "Laterality Date   • CARDIAC SURGERY     • EYE SURGERY         Past Medical History:   Diagnosis Date   • Disease of thyroid gland    • Hyperlipidemia    • Hypertension        History reviewed. No pertinent family history.    Social History     Socioeconomic History   • Marital status:      Spouse name: Not on file   • Number of children: Not on file   • Years of education: Not on file   • Highest education level: Not on file   Tobacco Use   • Smoking status: Never Smoker   • Smokeless tobacco: Never Used   Vaping Use   • Vaping Use: Never used   Substance and Sexual Activity   • Alcohol use: No           ECG 12 Lead    Date/Time: 3/9/2021 11:07 AM  Performed by: Valdo Atkins MD  Authorized by: Valdo Atkins MD   Comparison: compared with previous ECG   Similar to previous ECG  Comparison to previous ECG: Normal sinus rhythm nonspecific ST-T wave changes normal axis normal intervals no ectopy except for one premature atrial contraction.  63/min.  No significant change from 7/6/2020                Objective:       Physical Exam    /75 (BP Location: Left arm, Patient Position: Sitting, Cuff Size: Adult)   Pulse 66   Ht 129.5 cm (51\")   Wt 57.2 kg (126 lb)   SpO2 99%   BMI 34.06 kg/m²   The patient is alert, oriented and in no distress.    Vital signs as noted above.    Head and neck revealed no carotid bruits or jugular venous distension.  No thyromegaly or lymphadenopathy is present.    Lungs clear.  No wheezing.  Breath sounds are normal bilaterally.    Heart normal first and second heart sounds.  No murmur..  No pericardial rub is present.  No gallop is present.    Abdomen soft and nontender.  No organomegaly is present.    Extremities revealed good peripheral pulses without any pedal edema.    Skin warm and dry.    Musculoskeletal system is grossly normal.    CNS grossly normal.    Reviewed and unchanged from last visit.        "

## 2021-04-21 ENCOUNTER — TRANSCRIBE ORDERS (OUTPATIENT)
Dept: LAB | Facility: HOSPITAL | Age: 86
End: 2021-04-21

## 2021-04-21 ENCOUNTER — LAB (OUTPATIENT)
Dept: LAB | Facility: HOSPITAL | Age: 86
End: 2021-04-21

## 2021-04-21 DIAGNOSIS — E78.81 LIPOID DERMATOARTHRITIS: ICD-10-CM

## 2021-04-21 DIAGNOSIS — E55.9 AVITAMINOSIS D: ICD-10-CM

## 2021-04-21 DIAGNOSIS — I25.119 ATHEROSCLEROSIS OF NATIVE CORONARY ARTERY WITH ANGINA PECTORIS, UNSPECIFIED WHETHER NATIVE OR TRANSPLANTED HEART (HCC): ICD-10-CM

## 2021-04-21 DIAGNOSIS — M19.90 SENILE ARTHRITIS: ICD-10-CM

## 2021-04-21 DIAGNOSIS — M54.50 LOW BACK PAIN, UNSPECIFIED BACK PAIN LATERALITY, UNSPECIFIED CHRONICITY, UNSPECIFIED WHETHER SCIATICA PRESENT: ICD-10-CM

## 2021-04-21 DIAGNOSIS — I51.9 MYXEDEMA HEART DISEASE: ICD-10-CM

## 2021-04-21 DIAGNOSIS — E03.9 MYXEDEMA HEART DISEASE: Primary | ICD-10-CM

## 2021-04-21 DIAGNOSIS — I10 HYPERTENSION, ESSENTIAL: ICD-10-CM

## 2021-04-21 DIAGNOSIS — I51.9 MYXEDEMA HEART DISEASE: Primary | ICD-10-CM

## 2021-04-21 DIAGNOSIS — E03.9 MYXEDEMA HEART DISEASE: ICD-10-CM

## 2021-04-21 LAB
25(OH)D3 SERPL-MCNC: 60.2 NG/ML
ALBUMIN SERPL-MCNC: 4.3 G/DL (ref 3.5–5.2)
ALBUMIN/GLOB SERPL: 1.7 G/DL
ALP SERPL-CCNC: 48 U/L (ref 39–117)
ALT SERPL W P-5'-P-CCNC: 14 U/L (ref 1–33)
ANION GAP SERPL CALCULATED.3IONS-SCNC: 10 MMOL/L (ref 5–15)
AST SERPL-CCNC: 16 U/L (ref 1–32)
BASOPHILS # BLD AUTO: 0.09 10*3/MM3 (ref 0–0.2)
BASOPHILS NFR BLD AUTO: 1 % (ref 0–1.5)
BILIRUB SERPL-MCNC: 0.5 MG/DL (ref 0–1.2)
BUN SERPL-MCNC: 20 MG/DL (ref 8–23)
BUN/CREAT SERPL: 18.7 (ref 7–25)
CALCIUM SPEC-SCNC: 8.9 MG/DL (ref 8.6–10.5)
CHLORIDE SERPL-SCNC: 103 MMOL/L (ref 98–107)
CHOLEST SERPL-MCNC: 185 MG/DL (ref 0–200)
CO2 SERPL-SCNC: 28 MMOL/L (ref 22–29)
CREAT SERPL-MCNC: 1.07 MG/DL (ref 0.57–1)
DEPRECATED RDW RBC AUTO: 43.9 FL (ref 37–54)
EOSINOPHIL # BLD AUTO: 0.14 10*3/MM3 (ref 0–0.4)
EOSINOPHIL NFR BLD AUTO: 1.5 % (ref 0.3–6.2)
ERYTHROCYTE [DISTWIDTH] IN BLOOD BY AUTOMATED COUNT: 13.2 % (ref 12.3–15.4)
GFR SERPL CREATININE-BSD FRML MDRD: 49 ML/MIN/1.73
GLOBULIN UR ELPH-MCNC: 2.5 GM/DL
GLUCOSE SERPL-MCNC: 96 MG/DL (ref 65–99)
HCT VFR BLD AUTO: 38.9 % (ref 34–46.6)
HDLC SERPL-MCNC: 72 MG/DL (ref 40–60)
HGB BLD-MCNC: 12.7 G/DL (ref 12–15.9)
IMM GRANULOCYTES # BLD AUTO: 0.02 10*3/MM3 (ref 0–0.05)
IMM GRANULOCYTES NFR BLD AUTO: 0.2 % (ref 0–0.5)
LDLC SERPL CALC-MCNC: 91 MG/DL (ref 0–100)
LDLC/HDLC SERPL: 1.21 {RATIO}
LYMPHOCYTES # BLD AUTO: 2.46 10*3/MM3 (ref 0.7–3.1)
LYMPHOCYTES NFR BLD AUTO: 27.1 % (ref 19.6–45.3)
MCH RBC QN AUTO: 29.7 PG (ref 26.6–33)
MCHC RBC AUTO-ENTMCNC: 32.6 G/DL (ref 31.5–35.7)
MCV RBC AUTO: 90.9 FL (ref 79–97)
MONOCYTES # BLD AUTO: 0.8 10*3/MM3 (ref 0.1–0.9)
MONOCYTES NFR BLD AUTO: 8.8 % (ref 5–12)
NEUTROPHILS NFR BLD AUTO: 5.57 10*3/MM3 (ref 1.7–7)
NEUTROPHILS NFR BLD AUTO: 61.4 % (ref 42.7–76)
NRBC BLD AUTO-RTO: 0 /100 WBC (ref 0–0.2)
PLATELET # BLD AUTO: 243 10*3/MM3 (ref 140–450)
PMV BLD AUTO: 11.9 FL (ref 6–12)
POTASSIUM SERPL-SCNC: 4.1 MMOL/L (ref 3.5–5.2)
PROT SERPL-MCNC: 6.8 G/DL (ref 6–8.5)
RBC # BLD AUTO: 4.28 10*6/MM3 (ref 3.77–5.28)
SODIUM SERPL-SCNC: 141 MMOL/L (ref 136–145)
TRIGL SERPL-MCNC: 130 MG/DL (ref 0–150)
TSH SERPL DL<=0.05 MIU/L-ACNC: 2.36 UIU/ML (ref 0.27–4.2)
VLDLC SERPL-MCNC: 22 MG/DL (ref 5–40)
WBC # BLD AUTO: 9.08 10*3/MM3 (ref 3.4–10.8)

## 2021-04-21 PROCEDURE — 82306 VITAMIN D 25 HYDROXY: CPT

## 2021-04-21 PROCEDURE — 36415 COLL VENOUS BLD VENIPUNCTURE: CPT

## 2021-04-21 PROCEDURE — 84443 ASSAY THYROID STIM HORMONE: CPT

## 2021-04-21 PROCEDURE — 80061 LIPID PANEL: CPT

## 2021-04-21 PROCEDURE — 85025 COMPLETE CBC W/AUTO DIFF WBC: CPT

## 2021-04-21 PROCEDURE — 80053 COMPREHEN METABOLIC PANEL: CPT

## 2021-09-03 ENCOUNTER — TELEPHONE (OUTPATIENT)
Dept: CARDIOLOGY | Facility: CLINIC | Age: 86
End: 2021-09-03

## 2021-09-03 NOTE — TELEPHONE ENCOUNTER
PT WAS SCHEDULED FOR 9/20 BUT DR STOKES IS OUT THAT DAY. PT WANTS TO RESCHEDULE BUT WANTS TO SEE HIM SOONER BECAUSE SHE IS HAVING SOME MEDIUM TO SEVERE FATIGUE. PLEASE CALL TO SCHEDULE

## 2021-09-09 ENCOUNTER — OFFICE VISIT (OUTPATIENT)
Dept: CARDIOLOGY | Facility: CLINIC | Age: 86
End: 2021-09-09

## 2021-09-09 VITALS
DIASTOLIC BLOOD PRESSURE: 80 MMHG | RESPIRATION RATE: 16 BRPM | HEART RATE: 87 BPM | WEIGHT: 121 LBS | BODY MASS INDEX: 24.39 KG/M2 | OXYGEN SATURATION: 98 % | SYSTOLIC BLOOD PRESSURE: 137 MMHG | HEIGHT: 59 IN

## 2021-09-09 DIAGNOSIS — E78.2 MIXED HYPERLIPIDEMIA: ICD-10-CM

## 2021-09-09 DIAGNOSIS — Z95.820 STATUS POST ANGIOPLASTY WITH STENT: Primary | ICD-10-CM

## 2021-09-09 DIAGNOSIS — E03.9 ACQUIRED HYPOTHYROIDISM: ICD-10-CM

## 2021-09-09 DIAGNOSIS — I10 ESSENTIAL HYPERTENSION: ICD-10-CM

## 2021-09-09 DIAGNOSIS — R06.02 SHORTNESS OF BREATH: ICD-10-CM

## 2021-09-09 PROCEDURE — 99214 OFFICE O/P EST MOD 30 MIN: CPT | Performed by: INTERNAL MEDICINE

## 2021-09-09 PROCEDURE — 93000 ELECTROCARDIOGRAM COMPLETE: CPT | Performed by: INTERNAL MEDICINE

## 2021-09-09 NOTE — PROGRESS NOTES
Encounter Date:09/09/2021  Last seen 3/9/2021      Patient ID: Sol Milton is a 86 y.o. female.    Chief Complaint:    Status post stent  Hypertension  Hypothyroidism        History of Present Illness  Patient is having fatigue and mild shortness of breath.    Since I have last seen, the patient has been without any chest discomfort , palpitations, dizziness or syncope.  Denies having any headache ,abdominal pain ,nausea, vomiting , diarrhea constipation, loss of weight or loss of appetite.  Denies having any excessive bruising ,hematuria or blood in the stool.     Review of all systems negative except as indicated.     Reviewed ROS.  Assessment and Plan         ////////////////////////  Impression  -------------  -Fatigue and shortness of breath   - status post stent to RCA for totally occluded vessel level in 10/2009.       History of proximal inferior wall hypokinesis with ejection fraction of 55-60%.  60% mid LAD distal to diagonal branch 50-60% mid circumflex 60% ostial marginal and 60% distal circumflex coronary artery disease.      stress Cardiolite test 04/24/2018 showed mild apical infarction without ischemia     -hypothyroidism hypertension GERD and asthma    -Atrial fibrillation-new onset     -allergy to penicillin     -family history of coronary artery disease.  -----------------    Plan  ------------  Fatigue and shortness of breath.  Rule out progression of coronary artery disease.  Echocardiogram  Stress Cardiolite test  Lab work including CBC CMP lipid panel TSH magnesium level    Atrial fibrillation with controlled ventricular response-new onset  Fatigue may be caused by atrial fibrillation.  Consider anticoagulation and attempts to convert to sinus rhythm after ischemic work-up is completed.    Status post stent  EKG showed atrial fibrillation with controlled ventricular response    Chronic coronary artery disease-stable  Continue Plavix     Hypertension-137/80  Continue atenolol  amlodipine     Hypothyroidism-continue levothyroxine     Medications were reviewed and updated.  Follow-up in the office in  6 months.  Further plan will depend on patient's progress.  ///////////                       Diagnosis Plan   1. Status post angioplasty with stent  Adult Transthoracic Echo Complete W/ Cont if Necessary Per Protocol    Stress Test With Myocardial Perfusion One Day    Comprehensive Metabolic Panel    CBC & Differential    Magnesium    Lipid Panel    TSH   2. Mixed hyperlipidemia  Adult Transthoracic Echo Complete W/ Cont if Necessary Per Protocol    Stress Test With Myocardial Perfusion One Day    Comprehensive Metabolic Panel    CBC & Differential    Magnesium    Lipid Panel    TSH   3. Essential hypertension  Adult Transthoracic Echo Complete W/ Cont if Necessary Per Protocol    Stress Test With Myocardial Perfusion One Day    Comprehensive Metabolic Panel    CBC & Differential    Magnesium    Lipid Panel    TSH   4. Acquired hypothyroidism  Adult Transthoracic Echo Complete W/ Cont if Necessary Per Protocol    Stress Test With Myocardial Perfusion One Day    Comprehensive Metabolic Panel    CBC & Differential    Magnesium    Lipid Panel    TSH   5. Shortness of breath  Adult Transthoracic Echo Complete W/ Cont if Necessary Per Protocol    Stress Test With Myocardial Perfusion One Day    Comprehensive Metabolic Panel    CBC & Differential    Magnesium    Lipid Panel    TSH   LAB RESULTS (LAST 7 DAYS)    CBC        BMP        CMP         BNP        TROPONIN        CoAg        Creatinine Clearance  CrCl cannot be calculated (Patient's most recent lab result is older than the maximum 30 days allowed.).    ABG        Radiology  No radiology results for the last day                The following portions of the patient's history were reviewed and updated as appropriate: allergies, current medications, past family history, past medical history, past social history, past surgical history and  problem list.    Review of Systems   Constitutional: Positive for malaise/fatigue.   Cardiovascular: Negative for chest pain, leg swelling, palpitations and syncope.   Respiratory: Negative for shortness of breath.    Skin: Negative for rash.   Gastrointestinal: Negative for nausea and vomiting.   Neurological: Negative for dizziness, light-headedness and numbness.   All other systems reviewed and are negative.        Current Outpatient Medications:   •  amLODIPine (NORVASC) 10 MG tablet, , Disp: , Rfl:   •  atenolol (TENORMIN) 25 MG tablet, , Disp: , Rfl:   •  Cholecalciferol (VITAMIN D3) 5000 units capsule capsule, VITAMIN D3 5000 UNIT CAPS, Disp: , Rfl:   •  clopidogrel (PLAVIX) 75 MG tablet, , Disp: , Rfl:   •  ezetimibe (ZETIA) 10 MG tablet, , Disp: , Rfl:   •  levothyroxine (SYNTHROID) 88 MCG tablet, SYNTHROID 88 MCG TABS, Disp: , Rfl:   •  Loratadine (CLARITIN) 10 MG capsule, CLARITIN 10 MG CAPS, Disp: , Rfl:   •  Multiple Vitamins-Minerals (MULTI VITAMIN/MINERALS) tablet, MULTI VITAMIN/MINERALS TABS, Disp: , Rfl:   •  raloxifene (EVISTA) 60 MG tablet, Take 1 tablet by mouth Daily., Disp: , Rfl:   •  triamcinolone (KENALOG) 0.1 % ointment, Apply  topically to the appropriate area as directed 2 (Two) Times a Day., Disp: 30 g, Rfl: 0    Allergies   Allergen Reactions   • Penicillin G Rash   • Prednisone Nausea And Vomiting       No family history on file.    Past Surgical History:   Procedure Laterality Date   • CARDIAC SURGERY     • EYE SURGERY         Past Medical History:   Diagnosis Date   • Disease of thyroid gland    • Hyperlipidemia    • Hypertension        No family history on file.    Social History     Socioeconomic History   • Marital status:      Spouse name: Not on file   • Number of children: Not on file   • Years of education: Not on file   • Highest education level: Not on file   Tobacco Use   • Smoking status: Never Smoker   • Smokeless tobacco: Never Used   Vaping Use   • Vaping Use:  "Never used   Substance and Sexual Activity   • Alcohol use: No           ECG 12 Lead    Date/Time: 9/9/2021 2:45 PM  Performed by: Valdo Atkins MD  Authorized by: Valdo Atkins MD   Comparison: compared with previous ECG   Similar to previous ECG  Comparison to previous ECG: Atrial fibrillation with controlled ventricular response nonspecific ST-T wave changes normal axis normal intervals no ectopy change from 3/9/2021.  Patient was in sinus rhythm.                Objective:       Physical Exam    /80   Pulse 87   Resp 16   Ht 149.9 cm (59\")   Wt 54.9 kg (121 lb)   SpO2 98%   BMI 24.44 kg/m²   The patient is alert, oriented and in no distress.    Vital signs as noted above.    Head and neck revealed no carotid bruits or jugular venous distension.  No thyromegaly or lymphadenopathy is present.    Lungs clear.  No wheezing.  Breath sounds are normal bilaterally.    Heart normal first and second heart sounds.  No murmur..  No pericardial rub is present.  No gallop is present.    Abdomen soft and nontender.  No organomegaly is present.    Extremities revealed good peripheral pulses without any pedal edema.    Skin warm and dry.    Musculoskeletal system is grossly normal.    CNS grossly normal.    Reviewed and updated.      "

## 2021-09-10 ENCOUNTER — LAB (OUTPATIENT)
Dept: LAB | Facility: HOSPITAL | Age: 86
End: 2021-09-10

## 2021-09-10 DIAGNOSIS — Z95.820 STATUS POST ANGIOPLASTY WITH STENT: ICD-10-CM

## 2021-09-10 DIAGNOSIS — I10 ESSENTIAL HYPERTENSION: ICD-10-CM

## 2021-09-10 DIAGNOSIS — E78.2 MIXED HYPERLIPIDEMIA: ICD-10-CM

## 2021-09-10 DIAGNOSIS — E03.9 ACQUIRED HYPOTHYROIDISM: ICD-10-CM

## 2021-09-10 DIAGNOSIS — R06.02 SHORTNESS OF BREATH: ICD-10-CM

## 2021-09-10 LAB
ALBUMIN SERPL-MCNC: 4.2 G/DL (ref 3.5–5.2)
ALBUMIN/GLOB SERPL: 1.7 G/DL
ALP SERPL-CCNC: 41 U/L (ref 39–117)
ALT SERPL W P-5'-P-CCNC: 10 U/L (ref 1–33)
ANION GAP SERPL CALCULATED.3IONS-SCNC: 9.4 MMOL/L (ref 5–15)
AST SERPL-CCNC: 21 U/L (ref 1–32)
BASOPHILS # BLD AUTO: 0.07 10*3/MM3 (ref 0–0.2)
BASOPHILS NFR BLD AUTO: 1.1 % (ref 0–1.5)
BILIRUB SERPL-MCNC: 0.3 MG/DL (ref 0–1.2)
BUN SERPL-MCNC: 22 MG/DL (ref 8–23)
BUN/CREAT SERPL: 17.9 (ref 7–25)
CALCIUM SPEC-SCNC: 9.1 MG/DL (ref 8.6–10.5)
CHLORIDE SERPL-SCNC: 103 MMOL/L (ref 98–107)
CHOLEST SERPL-MCNC: 191 MG/DL (ref 0–200)
CO2 SERPL-SCNC: 27.6 MMOL/L (ref 22–29)
CREAT SERPL-MCNC: 1.23 MG/DL (ref 0.57–1)
DEPRECATED RDW RBC AUTO: 47.8 FL (ref 37–54)
EOSINOPHIL # BLD AUTO: 0.13 10*3/MM3 (ref 0–0.4)
EOSINOPHIL NFR BLD AUTO: 2.1 % (ref 0.3–6.2)
ERYTHROCYTE [DISTWIDTH] IN BLOOD BY AUTOMATED COUNT: 14 % (ref 12.3–15.4)
GFR SERPL CREATININE-BSD FRML MDRD: 42 ML/MIN/1.73
GLOBULIN UR ELPH-MCNC: 2.5 GM/DL
GLUCOSE SERPL-MCNC: 96 MG/DL (ref 65–99)
HCT VFR BLD AUTO: 39.1 % (ref 34–46.6)
HDLC SERPL-MCNC: 64 MG/DL (ref 40–60)
HGB BLD-MCNC: 12.4 G/DL (ref 12–15.9)
IMM GRANULOCYTES # BLD AUTO: 0.01 10*3/MM3 (ref 0–0.05)
IMM GRANULOCYTES NFR BLD AUTO: 0.2 % (ref 0–0.5)
LDLC SERPL CALC-MCNC: 111 MG/DL (ref 0–100)
LDLC/HDLC SERPL: 1.7 {RATIO}
LYMPHOCYTES # BLD AUTO: 1.88 10*3/MM3 (ref 0.7–3.1)
LYMPHOCYTES NFR BLD AUTO: 29.9 % (ref 19.6–45.3)
MAGNESIUM SERPL-MCNC: 2.5 MG/DL (ref 1.6–2.4)
MCH RBC QN AUTO: 29.6 PG (ref 26.6–33)
MCHC RBC AUTO-ENTMCNC: 31.7 G/DL (ref 31.5–35.7)
MCV RBC AUTO: 93.3 FL (ref 79–97)
MONOCYTES # BLD AUTO: 0.64 10*3/MM3 (ref 0.1–0.9)
MONOCYTES NFR BLD AUTO: 10.2 % (ref 5–12)
NEUTROPHILS NFR BLD AUTO: 3.55 10*3/MM3 (ref 1.7–7)
NEUTROPHILS NFR BLD AUTO: 56.5 % (ref 42.7–76)
NRBC BLD AUTO-RTO: 0 /100 WBC (ref 0–0.2)
PLATELET # BLD AUTO: 238 10*3/MM3 (ref 140–450)
PMV BLD AUTO: 12.3 FL (ref 6–12)
POTASSIUM SERPL-SCNC: 4.9 MMOL/L (ref 3.5–5.2)
PROT SERPL-MCNC: 6.7 G/DL (ref 6–8.5)
RBC # BLD AUTO: 4.19 10*6/MM3 (ref 3.77–5.28)
SODIUM SERPL-SCNC: 140 MMOL/L (ref 136–145)
TRIGL SERPL-MCNC: 91 MG/DL (ref 0–150)
TSH SERPL DL<=0.05 MIU/L-ACNC: 1.22 UIU/ML (ref 0.27–4.2)
VLDLC SERPL-MCNC: 16 MG/DL (ref 5–40)
WBC # BLD AUTO: 6.28 10*3/MM3 (ref 3.4–10.8)

## 2021-09-10 PROCEDURE — 80061 LIPID PANEL: CPT

## 2021-09-10 PROCEDURE — 85025 COMPLETE CBC W/AUTO DIFF WBC: CPT

## 2021-09-10 PROCEDURE — 83735 ASSAY OF MAGNESIUM: CPT

## 2021-09-10 PROCEDURE — 36415 COLL VENOUS BLD VENIPUNCTURE: CPT

## 2021-09-10 PROCEDURE — 80053 COMPREHEN METABOLIC PANEL: CPT

## 2021-09-10 PROCEDURE — 84443 ASSAY THYROID STIM HORMONE: CPT

## 2021-09-22 ENCOUNTER — OFFICE VISIT (OUTPATIENT)
Dept: CARDIOLOGY | Facility: CLINIC | Age: 86
End: 2021-09-22

## 2021-09-22 ENCOUNTER — HOSPITAL ENCOUNTER (OUTPATIENT)
Dept: CARDIOLOGY | Facility: HOSPITAL | Age: 86
Discharge: HOME | End: 2021-09-22

## 2021-09-22 VITALS
HEIGHT: 59 IN | WEIGHT: 121 LBS | SYSTOLIC BLOOD PRESSURE: 155 MMHG | DIASTOLIC BLOOD PRESSURE: 74 MMHG | BODY MASS INDEX: 24.39 KG/M2

## 2021-09-22 VITALS
HEIGHT: 59 IN | HEART RATE: 65 BPM | WEIGHT: 121 LBS | DIASTOLIC BLOOD PRESSURE: 82 MMHG | BODY MASS INDEX: 24.39 KG/M2 | SYSTOLIC BLOOD PRESSURE: 130 MMHG

## 2021-09-22 DIAGNOSIS — Z95.820 STATUS POST ANGIOPLASTY WITH STENT: Primary | ICD-10-CM

## 2021-09-22 DIAGNOSIS — E03.9 ACQUIRED HYPOTHYROIDISM: ICD-10-CM

## 2021-09-22 DIAGNOSIS — Z98.61 STATUS POST PERCUTANEOUS TRANSLUMINAL CORONARY ANGIOPLASTY: ICD-10-CM

## 2021-09-22 DIAGNOSIS — E78.2 MIXED HYPERLIPIDEMIA: ICD-10-CM

## 2021-09-22 DIAGNOSIS — R06.02 SHORTNESS OF BREATH: ICD-10-CM

## 2021-09-22 DIAGNOSIS — Z95.820 STATUS POST ANGIOPLASTY WITH STENT: ICD-10-CM

## 2021-09-22 DIAGNOSIS — I10 ESSENTIAL HYPERTENSION: ICD-10-CM

## 2021-09-22 LAB
BH CV ECHO MEAS - ACS: 1.5 CM
BH CV ECHO MEAS - AI DEC SLOPE: 194.9 CM/SEC^2
BH CV ECHO MEAS - AI DEC TIME: 2.2 SEC
BH CV ECHO MEAS - AI MAX PG: 75.2 MMHG
BH CV ECHO MEAS - AI MAX VEL: 433.6 CM/SEC
BH CV ECHO MEAS - AI P1/2T: 651.6 MSEC
BH CV ECHO MEAS - AO MAX PG (FULL): 10.5 MMHG
BH CV ECHO MEAS - AO MAX PG: 12.9 MMHG
BH CV ECHO MEAS - AO MEAN PG (FULL): 5.5 MMHG
BH CV ECHO MEAS - AO MEAN PG: 7 MMHG
BH CV ECHO MEAS - AO ROOT AREA (BSA CORRECTED): 1.8
BH CV ECHO MEAS - AO ROOT AREA: 5.6 CM^2
BH CV ECHO MEAS - AO ROOT DIAM: 2.7 CM
BH CV ECHO MEAS - AO V2 MAX: 179.9 CM/SEC
BH CV ECHO MEAS - AO V2 MEAN: 128.7 CM/SEC
BH CV ECHO MEAS - AO V2 VTI: 38.3 CM
BH CV ECHO MEAS - AVA(I,A): 1 CM^2
BH CV ECHO MEAS - AVA(I,D): 1 CM^2
BH CV ECHO MEAS - AVA(V,A): 1 CM^2
BH CV ECHO MEAS - AVA(V,D): 1 CM^2
BH CV ECHO MEAS - BSA(HAYCOCK): 1.5 M^2
BH CV ECHO MEAS - BSA: 1.5 M^2
BH CV ECHO MEAS - BZI_BMI: 24.4 KILOGRAMS/M^2
BH CV ECHO MEAS - BZI_METRIC_HEIGHT: 149.9 CM
BH CV ECHO MEAS - BZI_METRIC_WEIGHT: 54.9 KG
BH CV ECHO MEAS - EDV(CUBED): 61.1 ML
BH CV ECHO MEAS - EDV(MOD-SP4): 60 ML
BH CV ECHO MEAS - EDV(TEICH): 67.5 ML
BH CV ECHO MEAS - EF(CUBED): 75.1 %
BH CV ECHO MEAS - EF(MOD-SP4): 78.7 %
BH CV ECHO MEAS - EF(TEICH): 67.6 %
BH CV ECHO MEAS - ESV(CUBED): 15.2 ML
BH CV ECHO MEAS - ESV(MOD-SP4): 12.8 ML
BH CV ECHO MEAS - ESV(TEICH): 21.9 ML
BH CV ECHO MEAS - FS: 37.1 %
BH CV ECHO MEAS - IVS/LVPW: 1.3
BH CV ECHO MEAS - IVSD: 1.3 CM
BH CV ECHO MEAS - LA DIMENSION: 4.7 CM
BH CV ECHO MEAS - LA/AO: 1.8
BH CV ECHO MEAS - LV DIASTOLIC VOL/BSA (35-75): 40.3 ML/M^2
BH CV ECHO MEAS - LV MASS(C)D: 151.8 GRAMS
BH CV ECHO MEAS - LV MASS(C)DI: 101.9 GRAMS/M^2
BH CV ECHO MEAS - LV MAX PG: 2.5 MMHG
BH CV ECHO MEAS - LV MEAN PG: 1.5 MMHG
BH CV ECHO MEAS - LV SYSTOLIC VOL/BSA (12-30): 8.6 ML/M^2
BH CV ECHO MEAS - LV V1 MAX: 78.8 CM/SEC
BH CV ECHO MEAS - LV V1 MEAN: 58.4 CM/SEC
BH CV ECHO MEAS - LV V1 VTI: 16.4 CM
BH CV ECHO MEAS - LVIDD: 3.9 CM
BH CV ECHO MEAS - LVIDS: 2.5 CM
BH CV ECHO MEAS - LVOT AREA: 2.3 CM^2
BH CV ECHO MEAS - LVOT DIAM: 1.7 CM
BH CV ECHO MEAS - LVPWD: 0.98 CM
BH CV ECHO MEAS - MV A MAX VEL: 74.8 CM/SEC
BH CV ECHO MEAS - MV DEC SLOPE: 342.5 CM/SEC^2
BH CV ECHO MEAS - MV DEC TIME: 0.28 SEC
BH CV ECHO MEAS - MV E MAX VEL: 96.7 CM/SEC
BH CV ECHO MEAS - MV E/A: 1.3
BH CV ECHO MEAS - MV MAX PG: 3.7 MMHG
BH CV ECHO MEAS - MV MEAN PG: 1.5 MMHG
BH CV ECHO MEAS - MV V2 MAX: 96.2 CM/SEC
BH CV ECHO MEAS - MV V2 MEAN: 57.6 CM/SEC
BH CV ECHO MEAS - MV V2 VTI: 28.7 CM
BH CV ECHO MEAS - MVA(VTI): 1.3 CM^2
BH CV ECHO MEAS - PA ACC TIME: 0.11 SEC
BH CV ECHO MEAS - PA MAX PG (FULL): 2.6 MMHG
BH CV ECHO MEAS - PA MAX PG: 3.5 MMHG
BH CV ECHO MEAS - PA PR(ACCEL): 29.2 MMHG
BH CV ECHO MEAS - PA V2 MAX: 93 CM/SEC
BH CV ECHO MEAS - RAP SYSTOLE: 3 MMHG
BH CV ECHO MEAS - RV MAX PG: 0.89 MMHG
BH CV ECHO MEAS - RV MEAN PG: 0.4 MMHG
BH CV ECHO MEAS - RV V1 MAX: 47.1 CM/SEC
BH CV ECHO MEAS - RV V1 MEAN: 29.8 CM/SEC
BH CV ECHO MEAS - RV V1 VTI: 9.4 CM
BH CV ECHO MEAS - RVDD: 3.1 CM
BH CV ECHO MEAS - RVSP: 27.5 MMHG
BH CV ECHO MEAS - SI(AO): 144.7 ML/M^2
BH CV ECHO MEAS - SI(CUBED): 30.8 ML/M^2
BH CV ECHO MEAS - SI(LVOT): 25.7 ML/M^2
BH CV ECHO MEAS - SI(MOD-SP4): 31.7 ML/M^2
BH CV ECHO MEAS - SI(TEICH): 30.6 ML/M^2
BH CV ECHO MEAS - SV(AO): 215.5 ML
BH CV ECHO MEAS - SV(CUBED): 45.9 ML
BH CV ECHO MEAS - SV(LVOT): 38.2 ML
BH CV ECHO MEAS - SV(MOD-SP4): 47.3 ML
BH CV ECHO MEAS - SV(TEICH): 45.6 ML
BH CV ECHO MEAS - TR MAX VEL: 247.2 CM/SEC
BH CV REST NUCLEAR ISOTOPE DOSE: 11.1 MCI
BH CV STRESS BP STAGE 1: NORMAL
BH CV STRESS BP STAGE 2: NORMAL
BH CV STRESS COMMENTS STAGE 1: NORMAL
BH CV STRESS DOSE REGADENOSON STAGE 1: 0.4
BH CV STRESS DURATION MIN STAGE 1: 3
BH CV STRESS DURATION MIN STAGE 2: 3
BH CV STRESS DURATION SEC STAGE 1: 0
BH CV STRESS DURATION SEC STAGE 2: 0
BH CV STRESS GRADE STAGE 1: 10
BH CV STRESS GRADE STAGE 2: 12
BH CV STRESS HR STAGE 1: 73
BH CV STRESS HR STAGE 2: 98
BH CV STRESS METS STAGE 1: 5
BH CV STRESS METS STAGE 2: 7.5
BH CV STRESS NUCLEAR ISOTOPE DOSE: 34.3 MCI
BH CV STRESS PROTOCOL 1: NORMAL
BH CV STRESS RECOVERY BP: NORMAL MMHG
BH CV STRESS RECOVERY HR: 77 BPM
BH CV STRESS SPEED STAGE 1: 1.7
BH CV STRESS SPEED STAGE 2: 2.5
BH CV STRESS STAGE 1: 1
BH CV STRESS STAGE 2: 2
LV EF 2D ECHO EST: 60 %
MAXIMAL PREDICTED HEART RATE: 136 BPM
MAXIMAL PREDICTED HEART RATE: 136 BPM
PERCENT MAX PREDICTED HR: 72.06 %
STRESS BASELINE BP: NORMAL MMHG
STRESS BASELINE HR: 66 BPM
STRESS PERCENT HR: 85 %
STRESS POST ESTIMATED WORKLOAD: 7 METS
STRESS POST EXERCISE DUR MIN: 6 MIN
STRESS POST PEAK BP: NORMAL MMHG
STRESS POST PEAK HR: 98 BPM
STRESS TARGET HR: 116 BPM
STRESS TARGET HR: 116 BPM

## 2021-09-22 PROCEDURE — 99214 OFFICE O/P EST MOD 30 MIN: CPT | Performed by: INTERNAL MEDICINE

## 2021-09-22 PROCEDURE — 93306 TTE W/DOPPLER COMPLETE: CPT | Performed by: INTERNAL MEDICINE

## 2021-09-22 PROCEDURE — 93018 CV STRESS TEST I&R ONLY: CPT | Performed by: INTERNAL MEDICINE

## 2021-09-22 PROCEDURE — 0 TECHNETIUM SESTAMIBI: Performed by: INTERNAL MEDICINE

## 2021-09-22 PROCEDURE — A9500 TC99M SESTAMIBI: HCPCS | Performed by: INTERNAL MEDICINE

## 2021-09-22 PROCEDURE — 93016 CV STRESS TEST SUPVJ ONLY: CPT | Performed by: INTERNAL MEDICINE

## 2021-09-22 PROCEDURE — 78452 HT MUSCLE IMAGE SPECT MULT: CPT

## 2021-09-22 PROCEDURE — 93306 TTE W/DOPPLER COMPLETE: CPT

## 2021-09-22 PROCEDURE — 78452 HT MUSCLE IMAGE SPECT MULT: CPT | Performed by: INTERNAL MEDICINE

## 2021-09-22 PROCEDURE — 93017 CV STRESS TEST TRACING ONLY: CPT

## 2021-09-22 RX ADMIN — TECHNETIUM TC 99M SESTAMIBI 1 DOSE: 1 INJECTION INTRAVENOUS at 11:56

## 2021-09-22 RX ADMIN — TECHNETIUM TC 99M SESTAMIBI 1 DOSE: 1 INJECTION INTRAVENOUS at 13:30

## 2021-09-22 NOTE — PROGRESS NOTES
Encounter Date:09/22/2021  Last seen 9/9/2021      Patient ID: Sol Milton is a 86 y.o. female.    Chief Complaint:    Status post stent  Hypertension  Hypothyroidism        History of Present Illness  Patient recently was seen with following history.  Reviewed and updated.    Patient is having fatigue and mild shortness of breath.     Since I have last seen, the patient has been without any chest discomfort , palpitations, dizziness or syncope.  Denies having any headache ,abdominal pain ,nausea, vomiting , diarrhea constipation, loss of weight or loss of appetite.  Denies having any excessive bruising ,hematuria or blood in the stool.     Review of all systems negative except as indicated.     Reviewed ROS.  Assessment and Plan         ////////////////////////  Impression  -------------  -Fatigue and shortness of breath   - status post stent to RCA for totally occluded vessel level in 10/2009.       History of proximal inferior wall hypokinesis with ejection fraction of 55-60%.  60% mid LAD distal to diagonal branch 50-60% mid circumflex 60% ostial marginal and 60% distal circumflex coronary artery disease.    Echocardiogram-normal 9/22/2021.  Stress Cardiolite test-normal 9/22/2021      stress Cardiolite test 04/24/2018 showed mild apical infarction without ischemia     -hypothyroidism hypertension GERD and asthma     -Atrial fibrillation-new onset at last visit.  Patient is in sinus rhythm today 9/22/2021     -allergy to penicillin     -family history of coronary artery disease.  -----------------    Plan  ------------  Fatigue and shortness of breath.  Rule out progression of coronary artery disease.  Echocardiogram-normal as above  Stress Cardiolite test-normal as above  Lab work including CBC CMP lipid panel TSH magnesium level-normal 9/10/2021-reviewed.  BUN 22 creatinine 1.23     Atrial fibrillation with controlled ventricular response-at last visit  Patient is in sinus rhythm today.  Observe closely for  recurrence of atrial fibrillationStatus post stent  EKG showed atrial fibrillation with controlled ventricular response     Chronic coronary artery disease-stable  Continue Plavix     Hypertension-130/80  Continue atenolol amlodipine     Hypothyroidism-continue levothyroxine     Medications were reviewed and updated.  Follow-up in the office in 6 weeks.  Observe closely for atrial fibrillation.  Consider cardiac catheterization if patient has continued symptoms.  Further plan will depend on patient's progress.  ///////////                 Diagnosis Plan   1. Status post angioplasty with stent     2. Mixed hyperlipidemia     3. Essential hypertension     4. Acquired hypothyroidism     5. Shortness of breath     6. Status post percutaneous transluminal coronary angioplasty     LAB RESULTS (LAST 7 DAYS)    CBC        BMP        CMP         BNP        TROPONIN        CoAg        Creatinine Clearance  Estimated Creatinine Clearance: 29.5 mL/min (A) (by C-G formula based on SCr of 1.23 mg/dL (H)).    ABG        Radiology  No radiology results for the last day                The following portions of the patient's history were reviewed and updated as appropriate: allergies, current medications, past family history, past medical history, past social history, past surgical history and problem list.    Review of Systems   Constitutional: Negative for malaise/fatigue.   Cardiovascular: Negative for chest pain, leg swelling, palpitations and syncope.   Respiratory: Negative for shortness of breath.    Skin: Negative for rash.   Gastrointestinal: Negative for nausea and vomiting.   Neurological: Negative for dizziness, light-headedness and numbness.   All other systems reviewed and are negative.        Current Outpatient Medications:   •  amLODIPine (NORVASC) 10 MG tablet, , Disp: , Rfl:   •  atenolol (TENORMIN) 25 MG tablet, , Disp: , Rfl:   •  Cholecalciferol (VITAMIN D3) 5000 units capsule capsule, VITAMIN D3 5000 UNIT CAPS,  "Disp: , Rfl:   •  clopidogrel (PLAVIX) 75 MG tablet, , Disp: , Rfl:   •  ezetimibe (ZETIA) 10 MG tablet, , Disp: , Rfl:   •  levothyroxine (SYNTHROID) 88 MCG tablet, SYNTHROID 88 MCG TABS, Disp: , Rfl:   •  Loratadine (CLARITIN) 10 MG capsule, CLARITIN 10 MG CAPS, Disp: , Rfl:   •  Multiple Vitamins-Minerals (MULTI VITAMIN/MINERALS) tablet, MULTI VITAMIN/MINERALS TABS, Disp: , Rfl:   •  raloxifene (EVISTA) 60 MG tablet, Take 1 tablet by mouth Daily., Disp: , Rfl:   •  triamcinolone (KENALOG) 0.1 % ointment, Apply  topically to the appropriate area as directed 2 (Two) Times a Day., Disp: 30 g, Rfl: 0  No current facility-administered medications for this visit.    Allergies   Allergen Reactions   • Penicillin G Rash   • Prednisone Nausea And Vomiting       History reviewed. No pertinent family history.    Past Surgical History:   Procedure Laterality Date   • CARDIAC SURGERY     • EYE SURGERY         Past Medical History:   Diagnosis Date   • Disease of thyroid gland    • Hyperlipidemia    • Hypertension        History reviewed. No pertinent family history.    Social History     Socioeconomic History   • Marital status:      Spouse name: Not on file   • Number of children: Not on file   • Years of education: Not on file   • Highest education level: Not on file   Tobacco Use   • Smoking status: Never Smoker   • Smokeless tobacco: Never Used   Vaping Use   • Vaping Use: Never used   Substance and Sexual Activity   • Alcohol use: No         Procedures      Objective:       Physical Exam    /82   Pulse 65   Ht 149.9 cm (59\")   Wt 54.9 kg (121 lb)   BMI 24.44 kg/m²   The patient is alert, oriented and in no distress.    Vital signs as noted above.    Head and neck revealed no carotid bruits or jugular venous distension.  No thyromegaly or lymphadenopathy is present.    Lungs clear.  No wheezing.  Breath sounds are normal bilaterally.    Heart normal first and second heart sounds.  No murmur..  No " pericardial rub is present.  No gallop is present.    Abdomen soft and nontender.  No organomegaly is present.    Extremities revealed good peripheral pulses without any pedal edema.    Skin warm and dry.    Musculoskeletal system is grossly normal.    CNS grossly normal.    Reviewed and unchanged from last visit.

## 2021-11-03 ENCOUNTER — OFFICE VISIT (OUTPATIENT)
Dept: CARDIOLOGY | Facility: CLINIC | Age: 86
End: 2021-11-03

## 2021-11-03 VITALS
BODY MASS INDEX: 24.6 KG/M2 | WEIGHT: 122 LBS | DIASTOLIC BLOOD PRESSURE: 62 MMHG | HEART RATE: 63 BPM | HEIGHT: 59 IN | SYSTOLIC BLOOD PRESSURE: 104 MMHG | OXYGEN SATURATION: 99 %

## 2021-11-03 DIAGNOSIS — Z95.820 STATUS POST ANGIOPLASTY WITH STENT: Primary | ICD-10-CM

## 2021-11-03 DIAGNOSIS — I10 ESSENTIAL HYPERTENSION: ICD-10-CM

## 2021-11-03 DIAGNOSIS — E03.9 ACQUIRED HYPOTHYROIDISM: ICD-10-CM

## 2021-11-03 DIAGNOSIS — R06.02 SHORTNESS OF BREATH: ICD-10-CM

## 2021-11-03 DIAGNOSIS — E78.2 MIXED HYPERLIPIDEMIA: ICD-10-CM

## 2021-11-03 PROCEDURE — 99213 OFFICE O/P EST LOW 20 MIN: CPT | Performed by: INTERNAL MEDICINE

## 2021-11-03 NOTE — PROGRESS NOTES
Encounter Date:11/03/2021  Last seen 9/22/2021      Patient ID: Sol Milton is a 86 y.o. female.    Chief Complaint:    Status post stent  Hypertension  Hypothyroidism        History of Present Illness  Mild fatigue.    Since I have last seen, the patient has been without any chest discomfort ,shortness of breath, palpitations, dizziness or syncope.  Denies having any headache ,abdominal pain ,nausea, vomiting , diarrhea constipation, loss of weight or loss of appetite.  Denies having any excessive bruising ,hematuria or blood in the stool.    Review of all systems negative except as indicated.    Reviewed ROS.    Assessment and Plan         ////////////////////////  Impression  -------------  -Fatigue and shortness of breath-better although still has mild fatigue.     - status post stent to RCA for totally occluded vessel level in 10/2009.       History of proximal inferior wall hypokinesis with ejection fraction of 55-60%.  60% mid LAD distal to diagonal branch 50-60% mid circumflex 60% ostial marginal and 60% distal circumflex coronary artery disease.     Echocardiogram-normal 9/22/2021.  Stress Cardiolite test-normal 9/22/2021      stress Cardiolite test 04/24/2018 showed mild apical infarction without ischemia     -hypothyroidism hypertension GERD and asthma     -Atrial fibrillation-new onset at last visit.  Patient is in sinus rhythm today 9/22/2021     -allergy to penicillin     -family history of coronary artery disease.  -----------------    Plan  ------------  Fatigue and shortness of breath.  Better except for mild fatigue.    Status post stent  Patient is not having any angina pectoris or congestive heart failure.    Occasional paroxysmal atrial fibrillation with controlled ventricular response  Patient is maintaining sinus rhythm.     Chronic coronary artery disease-stable  Continue Plavix     Hypertension-130/80  Continue atenolol amlodipine     Hypothyroidism-continue levothyroxine     Medications  were reviewed and updated.  Follow-up in the office in  6 months.  Observe closely for atrial fibrillation.  No need for cardiac catheterization.  Further plan will depend on patient's progress.  ///////////             Diagnosis Plan   1. Status post angioplasty with stent     2. Mixed hyperlipidemia     3. Essential hypertension     4. Acquired hypothyroidism     5. Shortness of breath     LAB RESULTS (LAST 7 DAYS)    CBC        BMP        CMP         BNP        TROPONIN        CoAg        Creatinine Clearance  CrCl cannot be calculated (Patient's most recent lab result is older than the maximum 30 days allowed.).    ABG        Radiology  No radiology results for the last day                The following portions of the patient's history were reviewed and updated as appropriate: allergies, current medications, past family history, past medical history, past social history, past surgical history and problem list.    Review of Systems   Constitutional: Negative for malaise/fatigue.   Cardiovascular: Negative for chest pain, leg swelling, palpitations and syncope.   Respiratory: Negative for shortness of breath.    Skin: Negative for rash.   Gastrointestinal: Negative for nausea and vomiting.   Neurological: Negative for dizziness, light-headedness and numbness.   All other systems reviewed and are negative.        Current Outpatient Medications:   •  amLODIPine (NORVASC) 10 MG tablet, , Disp: , Rfl:   •  atenolol (TENORMIN) 25 MG tablet, , Disp: , Rfl:   •  Cholecalciferol (VITAMIN D3) 5000 units capsule capsule, VITAMIN D3 5000 UNIT CAPS, Disp: , Rfl:   •  clopidogrel (PLAVIX) 75 MG tablet, , Disp: , Rfl:   •  ezetimibe (ZETIA) 10 MG tablet, , Disp: , Rfl:   •  levothyroxine (SYNTHROID) 88 MCG tablet, SYNTHROID 88 MCG TABS, Disp: , Rfl:   •  Loratadine (CLARITIN) 10 MG capsule, CLARITIN 10 MG CAPS, Disp: , Rfl:   •  raloxifene (EVISTA) 60 MG tablet, Take 1 tablet by mouth Daily., Disp: , Rfl:   •  Multiple  "Vitamins-Minerals (MULTI VITAMIN/MINERALS) tablet, MULTI VITAMIN/MINERALS TABS, Disp: , Rfl:   •  triamcinolone (KENALOG) 0.1 % ointment, Apply  topically to the appropriate area as directed 2 (Two) Times a Day., Disp: 30 g, Rfl: 0    Allergies   Allergen Reactions   • Penicillin G Rash   • Prednisone Nausea And Vomiting       History reviewed. No pertinent family history.    Past Surgical History:   Procedure Laterality Date   • CARDIAC SURGERY     • EYE SURGERY         Past Medical History:   Diagnosis Date   • Disease of thyroid gland    • Hyperlipidemia    • Hypertension        History reviewed. No pertinent family history.    Social History     Socioeconomic History   • Marital status:    Tobacco Use   • Smoking status: Never Smoker   • Smokeless tobacco: Never Used   Vaping Use   • Vaping Use: Never used   Substance and Sexual Activity   • Alcohol use: No   • Drug use: Defer   • Sexual activity: Defer         Procedures      Objective:       Physical Exam    /62 (BP Location: Left arm, Patient Position: Sitting, Cuff Size: Adult)   Pulse 63   Ht 149.9 cm (59\")   Wt 55.3 kg (122 lb)   SpO2 99%   BMI 24.64 kg/m²   The patient is alert, oriented and in no distress.    Vital signs as noted above.    Head and neck revealed no carotid bruits or jugular venous distension.  No thyromegaly or lymphadenopathy is present.    Lungs clear.  No wheezing.  Breath sounds are normal bilaterally.    Heart normal first and second heart sounds.  No murmur..  No pericardial rub is present.  No gallop is present.    Abdomen soft and nontender.  No organomegaly is present.    Extremities revealed good peripheral pulses without any pedal edema.    Skin warm and dry.    Musculoskeletal system is grossly normal.    CNS grossly normal.    Reviewed and unchanged from last visit.        "

## 2021-11-29 ENCOUNTER — TRANSCRIBE ORDERS (OUTPATIENT)
Dept: ADMINISTRATIVE | Facility: HOSPITAL | Age: 86
End: 2021-11-29

## 2021-11-29 ENCOUNTER — HOSPITAL ENCOUNTER (OUTPATIENT)
Dept: CARDIOLOGY | Facility: HOSPITAL | Age: 86
Discharge: HOME | End: 2021-11-29
Admitting: FAMILY MEDICINE

## 2021-11-29 DIAGNOSIS — I48.0 PAF (PAROXYSMAL ATRIAL FIBRILLATION) (HCC): ICD-10-CM

## 2021-11-29 DIAGNOSIS — I25.10 CORONARY ARTERY DISEASE, NON-OCCLUSIVE: Primary | ICD-10-CM

## 2021-11-29 DIAGNOSIS — I10 ESSENTIAL HYPERTENSION, BENIGN: ICD-10-CM

## 2021-11-29 DIAGNOSIS — R53.83 FATIGUE, UNSPECIFIED TYPE: ICD-10-CM

## 2021-11-29 DIAGNOSIS — I25.10 CORONARY ARTERY DISEASE, NON-OCCLUSIVE: ICD-10-CM

## 2021-11-29 PROCEDURE — 93010 ELECTROCARDIOGRAM REPORT: CPT | Performed by: INTERNAL MEDICINE

## 2021-11-29 PROCEDURE — 93005 ELECTROCARDIOGRAM TRACING: CPT | Performed by: FAMILY MEDICINE

## 2021-11-30 LAB — QT INTERVAL: 369 MS

## 2021-12-06 ENCOUNTER — HOSPITAL ENCOUNTER (OUTPATIENT)
Dept: RESPIRATORY THERAPY | Facility: HOSPITAL | Age: 86
Discharge: HOME | End: 2021-12-06

## 2021-12-06 DIAGNOSIS — I25.10 CORONARY ARTERY DISEASE, NON-OCCLUSIVE: ICD-10-CM

## 2021-12-06 DIAGNOSIS — I48.0 PAF (PAROXYSMAL ATRIAL FIBRILLATION) (HCC): ICD-10-CM

## 2021-12-06 DIAGNOSIS — I10 ESSENTIAL HYPERTENSION, BENIGN: ICD-10-CM

## 2021-12-06 DIAGNOSIS — R53.83 FATIGUE, UNSPECIFIED TYPE: ICD-10-CM

## 2022-01-06 PROCEDURE — 93228 REMOTE 30 DAY ECG REV/REPORT: CPT | Performed by: INTERNAL MEDICINE

## 2022-01-19 ENCOUNTER — TELEPHONE (OUTPATIENT)
Dept: CARDIOLOGY | Facility: CLINIC | Age: 87
End: 2022-01-19

## 2022-01-19 NOTE — TELEPHONE ENCOUNTER
Okay to use to help her with other medical issues.  Patient had remote stent and should be okay from cardiovascular standpoint.

## 2022-01-19 NOTE — TELEPHONE ENCOUNTER
Caller: Sol Milton     Relationship: SELF  Best call back number: [phone]  What is your medical concern? Gynecologist put her on estrogen cream, premarin. It says on label to not take If you have had a heart attack. She had heart attack in 2009. She would like to know if this Is ok to use?

## 2022-04-04 ENCOUNTER — TELEPHONE (OUTPATIENT)
Dept: CARDIOLOGY | Facility: CLINIC | Age: 87
End: 2022-04-04

## 2022-04-04 NOTE — TELEPHONE ENCOUNTER
DR. LULY ARNETT   TOOTH EXTRACTION  HOW MANY DAYS PRIOR CAN PATIENT HOLD BLOOD THINNERS?   LOV 11/3/21  [facility] [phone]  [phone]    DR. ARNETT CALLED HIMSELF.

## 2022-04-28 ENCOUNTER — LAB (OUTPATIENT)
Dept: LAB | Facility: HOSPITAL | Age: 87
End: 2022-04-28

## 2022-04-28 ENCOUNTER — TRANSCRIBE ORDERS (OUTPATIENT)
Dept: ADMINISTRATIVE | Facility: HOSPITAL | Age: 87
End: 2022-04-28

## 2022-04-28 DIAGNOSIS — I51.9 MYXEDEMA HEART DISEASE: ICD-10-CM

## 2022-04-28 DIAGNOSIS — E55.9 VITAMIN D DEFICIENCY DISEASE: ICD-10-CM

## 2022-04-28 DIAGNOSIS — M85.80 SAPHO SYNDROME: ICD-10-CM

## 2022-04-28 DIAGNOSIS — L70.9 SAPHO SYNDROME: ICD-10-CM

## 2022-04-28 DIAGNOSIS — I10 ESSENTIAL HYPERTENSION, MALIGNANT: ICD-10-CM

## 2022-04-28 DIAGNOSIS — I25.10 DISEASE OF CARDIOVASCULAR SYSTEM: ICD-10-CM

## 2022-04-28 DIAGNOSIS — M19.90 SENILE ARTHRITIS: ICD-10-CM

## 2022-04-28 DIAGNOSIS — I51.9 MYXEDEMA HEART DISEASE: Primary | ICD-10-CM

## 2022-04-28 DIAGNOSIS — E03.9 MYXEDEMA HEART DISEASE: Primary | ICD-10-CM

## 2022-04-28 DIAGNOSIS — L40.3 SAPHO SYNDROME: ICD-10-CM

## 2022-04-28 DIAGNOSIS — M86.9 SAPHO SYNDROME: ICD-10-CM

## 2022-04-28 DIAGNOSIS — E03.9 MYXEDEMA HEART DISEASE: ICD-10-CM

## 2022-04-28 DIAGNOSIS — E78.81 LIPOID DERMATOARTHRITIS: ICD-10-CM

## 2022-04-28 DIAGNOSIS — L29.9 PRURITUS OF SKIN: ICD-10-CM

## 2022-04-28 DIAGNOSIS — M65.9 SAPHO SYNDROME: ICD-10-CM

## 2022-04-28 LAB
25(OH)D3 SERPL-MCNC: 54.3 NG/ML (ref 30–100)
ALBUMIN SERPL-MCNC: 4.4 G/DL (ref 3.5–5.2)
ALBUMIN/GLOB SERPL: 1.6 G/DL
ALP SERPL-CCNC: 44 U/L (ref 39–117)
ALT SERPL W P-5'-P-CCNC: 10 U/L (ref 1–33)
ANION GAP SERPL CALCULATED.3IONS-SCNC: 9 MMOL/L (ref 5–15)
AST SERPL-CCNC: 22 U/L (ref 1–32)
BASOPHILS # BLD AUTO: 0.06 10*3/MM3 (ref 0–0.2)
BASOPHILS NFR BLD AUTO: 0.8 % (ref 0–1.5)
BILIRUB SERPL-MCNC: 0.6 MG/DL (ref 0–1.2)
BUN SERPL-MCNC: 21 MG/DL (ref 8–23)
BUN/CREAT SERPL: 18.4 (ref 7–25)
CALCIUM SPEC-SCNC: 9.5 MG/DL (ref 8.6–10.5)
CHLORIDE SERPL-SCNC: 104 MMOL/L (ref 98–107)
CHOLEST SERPL-MCNC: 189 MG/DL (ref 0–200)
CO2 SERPL-SCNC: 28 MMOL/L (ref 22–29)
CREAT SERPL-MCNC: 1.14 MG/DL (ref 0.57–1)
DEPRECATED RDW RBC AUTO: 45.8 FL (ref 37–54)
EGFRCR SERPLBLD CKD-EPI 2021: 47.3 ML/MIN/1.73
EOSINOPHIL # BLD AUTO: 0.22 10*3/MM3 (ref 0–0.4)
EOSINOPHIL NFR BLD AUTO: 3.1 % (ref 0.3–6.2)
ERYTHROCYTE [DISTWIDTH] IN BLOOD BY AUTOMATED COUNT: 13.7 % (ref 12.3–15.4)
GLOBULIN UR ELPH-MCNC: 2.7 GM/DL
GLUCOSE SERPL-MCNC: 100 MG/DL (ref 65–99)
HCT VFR BLD AUTO: 37.6 % (ref 34–46.6)
HDLC SERPL-MCNC: 63 MG/DL (ref 40–60)
HGB BLD-MCNC: 12 G/DL (ref 12–15.9)
IMM GRANULOCYTES # BLD AUTO: 0.03 10*3/MM3 (ref 0–0.05)
IMM GRANULOCYTES NFR BLD AUTO: 0.4 % (ref 0–0.5)
LDLC SERPL CALC-MCNC: 106 MG/DL (ref 0–100)
LDLC/HDLC SERPL: 1.64 {RATIO}
LYMPHOCYTES # BLD AUTO: 2.25 10*3/MM3 (ref 0.7–3.1)
LYMPHOCYTES NFR BLD AUTO: 31.6 % (ref 19.6–45.3)
MCH RBC QN AUTO: 29.2 PG (ref 26.6–33)
MCHC RBC AUTO-ENTMCNC: 31.9 G/DL (ref 31.5–35.7)
MCV RBC AUTO: 91.5 FL (ref 79–97)
MONOCYTES # BLD AUTO: 0.69 10*3/MM3 (ref 0.1–0.9)
MONOCYTES NFR BLD AUTO: 9.7 % (ref 5–12)
NEUTROPHILS NFR BLD AUTO: 3.86 10*3/MM3 (ref 1.7–7)
NEUTROPHILS NFR BLD AUTO: 54.4 % (ref 42.7–76)
NRBC BLD AUTO-RTO: 0 /100 WBC (ref 0–0.2)
PLATELET # BLD AUTO: 287 10*3/MM3 (ref 140–450)
PMV BLD AUTO: 11.7 FL (ref 6–12)
POTASSIUM SERPL-SCNC: 4.6 MMOL/L (ref 3.5–5.2)
PROT SERPL-MCNC: 7.1 G/DL (ref 6–8.5)
RBC # BLD AUTO: 4.11 10*6/MM3 (ref 3.77–5.28)
SODIUM SERPL-SCNC: 141 MMOL/L (ref 136–145)
TRIGL SERPL-MCNC: 112 MG/DL (ref 0–150)
TSH SERPL DL<=0.05 MIU/L-ACNC: 2.86 UIU/ML (ref 0.27–4.2)
VLDLC SERPL-MCNC: 20 MG/DL (ref 5–40)
WBC NRBC COR # BLD: 7.11 10*3/MM3 (ref 3.4–10.8)

## 2022-04-28 PROCEDURE — 84443 ASSAY THYROID STIM HORMONE: CPT

## 2022-04-28 PROCEDURE — 80061 LIPID PANEL: CPT

## 2022-04-28 PROCEDURE — 82306 VITAMIN D 25 HYDROXY: CPT

## 2022-04-28 PROCEDURE — 36415 COLL VENOUS BLD VENIPUNCTURE: CPT

## 2022-04-28 PROCEDURE — 85025 COMPLETE CBC W/AUTO DIFF WBC: CPT

## 2022-04-28 PROCEDURE — 80053 COMPREHEN METABOLIC PANEL: CPT

## 2022-05-09 ENCOUNTER — OFFICE VISIT (OUTPATIENT)
Dept: CARDIOLOGY | Facility: CLINIC | Age: 87
End: 2022-05-09

## 2022-05-09 VITALS
WEIGHT: 124 LBS | DIASTOLIC BLOOD PRESSURE: 75 MMHG | OXYGEN SATURATION: 100 % | HEIGHT: 59 IN | BODY MASS INDEX: 25 KG/M2 | SYSTOLIC BLOOD PRESSURE: 144 MMHG | HEART RATE: 68 BPM

## 2022-05-09 DIAGNOSIS — Z98.61 STATUS POST PERCUTANEOUS TRANSLUMINAL CORONARY ANGIOPLASTY: ICD-10-CM

## 2022-05-09 DIAGNOSIS — I10 ESSENTIAL HYPERTENSION: ICD-10-CM

## 2022-05-09 DIAGNOSIS — I25.10 CORONARY ARTERY DISEASE, NON-OCCLUSIVE: Primary | ICD-10-CM

## 2022-05-09 DIAGNOSIS — I10 ESSENTIAL HYPERTENSION, BENIGN: ICD-10-CM

## 2022-05-09 DIAGNOSIS — I48.0 PAF (PAROXYSMAL ATRIAL FIBRILLATION): ICD-10-CM

## 2022-05-09 DIAGNOSIS — E03.9 ACQUIRED HYPOTHYROIDISM: ICD-10-CM

## 2022-05-09 DIAGNOSIS — E78.2 MIXED HYPERLIPIDEMIA: ICD-10-CM

## 2022-05-09 PROCEDURE — 99214 OFFICE O/P EST MOD 30 MIN: CPT | Performed by: INTERNAL MEDICINE

## 2022-05-09 PROCEDURE — 93000 ELECTROCARDIOGRAM COMPLETE: CPT | Performed by: INTERNAL MEDICINE

## 2022-05-09 RX ORDER — ASPIRIN 81 MG/1
81 TABLET, CHEWABLE ORAL DAILY
COMMUNITY

## 2022-05-09 NOTE — PROGRESS NOTES
Encounter Date:05/09/2022    Last seen 11/03/2021      Patient ID: Sol Milton is a 87 y.o. female.    Chief Complaint:    Status post stent  Hypertension  Hypothyroidism        History of Present Illness  Since I have last seen, the patient has been without any chest discomfort ,shortness of breath, palpitations, dizziness or syncope.  Denies having any headache ,abdominal pain ,nausea, vomiting , diarrhea constipation, loss of weight or loss of appetite.  Denies having any excessive bruising ,hematuria or blood in the stool.    Review of all systems negative except as indicated.    Reviewed ROS.  Assessment and Plan         ////////////////////////  Impression  -------------  -Fatigue and shortness of breath-better although still has mild fatigue.      - status post stent to RCA for totally occluded vessel level in 10/2009.       History of proximal inferior wall hypokinesis with ejection fraction of 55-60%.  60% mid LAD distal to diagonal branch 50-60% mid circumflex 60% ostial marginal and 60% distal circumflex coronary artery disease.     Echocardiogram-normal 9/22/2021.  Stress Cardiolite test-normal 9/22/2021      stress Cardiolite test 04/24/2018 showed mild apical infarction without ischemia     -hypothyroidism hypertension GERD and asthma     -Atrial fibrillation-new onset at last visit.  Patient is in sinus rhythm today 9/22/2021     -allergy to penicillin     -family history of coronary artery disease.  -----------------    Plan  ------------   Status post stent  Patient is not having any angina pectoris or congestive heart failure.     Occasional paroxysmal atrial fibrillation with controlled ventricular response  Patient is maintaining sinus rhythm.  EKG showed normal sinus rhythm normal ECG     Chronic coronary artery disease-stable  Patient was taken off Plavix and changed to baby aspirin by Dr. Cox primary care physician.    Hypertension- 144/75 continue atenolol  amlodipine     Hypothyroidism-continue levothyroxine     Medications were reviewed and updated.  Follow-up in the office in  6 months.  Observe closely for atrial fibrillation.    Further plan will depend on patient's progress.  ///////////             Diagnosis Plan   1. Coronary artery disease, non-occlusive  ECG 12 Lead   2. Essential hypertension, benign  ECG 12 Lead   3. Mixed hyperlipidemia  ECG 12 Lead   4. Acquired hypothyroidism  ECG 12 Lead   5. Essential hypertension  ECG 12 Lead   6. PAF (paroxysmal atrial fibrillation) (HCC)  ECG 12 Lead   7. Status post percutaneous transluminal coronary angioplasty  ECG 12 Lead   LAB RESULTS (LAST 7 DAYS)    CBC        BMP        CMP         BNP        TROPONIN        CoAg        Creatinine Clearance  Estimated Creatinine Clearance: 32 mL/min (A) (by C-G formula based on SCr of 1.14 mg/dL (H)).    ABG        Radiology  No radiology results for the last day                The following portions of the patient's history were reviewed and updated as appropriate: allergies, current medications, past family history, past medical history, past social history, past surgical history and problem list.    Review of Systems   Constitutional: Negative for malaise/fatigue.   Cardiovascular: Negative for chest pain, leg swelling, palpitations and syncope.   Respiratory: Negative for shortness of breath.    Skin: Negative for rash.   Gastrointestinal: Negative for nausea and vomiting.   Neurological: Negative for dizziness, light-headedness and numbness.   All other systems reviewed and are negative.        Current Outpatient Medications:   •  amLODIPine (NORVASC) 10 MG tablet, , Disp: , Rfl:   •  aspirin 81 MG chewable tablet, Chew 81 mg Daily., Disp: , Rfl:   •  atenolol (TENORMIN) 25 MG tablet, , Disp: , Rfl:   •  Cholecalciferol (VITAMIN D3) 5000 units capsule capsule, VITAMIN D3 5000 UNIT CAPS, Disp: , Rfl:   •  ezetimibe (ZETIA) 10 MG tablet, , Disp: , Rfl:   •  levothyroxine  "(SYNTHROID, LEVOTHROID) 88 MCG tablet, SYNTHROID 88 MCG TABS, Disp: , Rfl:   •  Loratadine 10 MG capsule, CLARITIN 10 MG CAPS, Disp: , Rfl:   •  raloxifene (EVISTA) 60 MG tablet, Take 1 tablet by mouth Daily., Disp: , Rfl:   •  triamcinolone (KENALOG) 0.1 % ointment, Apply  topically to the appropriate area as directed 2 (Two) Times a Day., Disp: 30 g, Rfl: 0  •  clopidogrel (PLAVIX) 75 MG tablet, , Disp: , Rfl:   •  Multiple Vitamins-Minerals (MULTI VITAMIN/MINERALS) tablet, MULTI VITAMIN/MINERALS TABS, Disp: , Rfl:     Allergies   Allergen Reactions   • Penicillin G Rash   • Prednisone Nausea And Vomiting       History reviewed. No pertinent family history.    Past Surgical History:   Procedure Laterality Date   • CARDIAC SURGERY     • EYE SURGERY         Past Medical History:   Diagnosis Date   • Disease of thyroid gland    • Hyperlipidemia    • Hypertension        History reviewed. No pertinent family history.    Social History     Socioeconomic History   • Marital status:    Tobacco Use   • Smoking status: Never Smoker   • Smokeless tobacco: Never Used   Vaping Use   • Vaping Use: Never used   Substance and Sexual Activity   • Alcohol use: No   • Drug use: Defer   • Sexual activity: Defer           ECG 12 Lead    Date/Time: 5/9/2022 3:25 PM  Performed by: Valdo Atkins MD  Authorized by: Valdo Atkins MD   Comparison: compared with previous ECG   Similar to previous ECG  Comparison to previous ECG: Normal sinus rhythm nonspecific ST-T wave changes normal axis normal intervals no ectopy 64/min no significant change from 11/29/2021                Objective:       Physical Exam    /75 (BP Location: Left arm, Patient Position: Sitting, Cuff Size: Adult)   Pulse 68   Ht 149.9 cm (59\")   Wt 56.2 kg (124 lb)   SpO2 100%   BMI 25.04 kg/m²   The patient is alert, oriented and in no distress.    Vital signs as noted above.    Head and neck revealed no carotid bruits or jugular venous distension. "  No thyromegaly or lymphadenopathy is present.    Lungs clear.  No wheezing.  Breath sounds are normal bilaterally.    Heart normal first and second heart sounds.  No murmur..  No pericardial rub is present.  No gallop is present.    Abdomen soft and nontender.  No organomegaly is present.    Extremities revealed good peripheral pulses without any pedal edema.    Skin warm and dry.    Musculoskeletal system is grossly normal.    CNS grossly normal.    Reviewed and updated.

## 2022-05-31 ENCOUNTER — TRANSCRIBE ORDERS (OUTPATIENT)
Dept: ADMINISTRATIVE | Facility: HOSPITAL | Age: 87
End: 2022-05-31

## 2022-05-31 DIAGNOSIS — M85.80 OSTEOPENIA, UNSPECIFIED LOCATION: Primary | ICD-10-CM

## 2022-05-31 DIAGNOSIS — Z12.31 VISIT FOR SCREENING MAMMOGRAM: ICD-10-CM

## 2022-06-24 ENCOUNTER — HOSPITAL ENCOUNTER (OUTPATIENT)
Dept: BONE DENSITY | Facility: HOSPITAL | Age: 87
Discharge: HOME | End: 2022-06-24

## 2022-06-24 ENCOUNTER — HOSPITAL ENCOUNTER (OUTPATIENT)
Dept: MAMMOGRAPHY | Facility: HOSPITAL | Age: 87
Discharge: HOME | End: 2022-06-24

## 2022-06-24 DIAGNOSIS — Z12.31 VISIT FOR SCREENING MAMMOGRAM: ICD-10-CM

## 2022-06-24 DIAGNOSIS — M85.80 OSTEOPENIA, UNSPECIFIED LOCATION: ICD-10-CM

## 2022-06-24 PROCEDURE — 77063 BREAST TOMOSYNTHESIS BI: CPT

## 2022-06-24 PROCEDURE — 77080 DXA BONE DENSITY AXIAL: CPT

## 2022-06-24 PROCEDURE — 77067 SCR MAMMO BI INCL CAD: CPT

## 2022-08-08 ENCOUNTER — OFFICE VISIT (OUTPATIENT)
Dept: CARDIOLOGY | Facility: CLINIC | Age: 87
End: 2022-08-08

## 2022-08-08 VITALS
HEIGHT: 59 IN | SYSTOLIC BLOOD PRESSURE: 148 MMHG | WEIGHT: 113.6 LBS | BODY MASS INDEX: 22.9 KG/M2 | OXYGEN SATURATION: 100 % | HEART RATE: 74 BPM | DIASTOLIC BLOOD PRESSURE: 79 MMHG

## 2022-08-08 DIAGNOSIS — I48.0 PAF (PAROXYSMAL ATRIAL FIBRILLATION): ICD-10-CM

## 2022-08-08 DIAGNOSIS — E03.9 ACQUIRED HYPOTHYROIDISM: ICD-10-CM

## 2022-08-08 DIAGNOSIS — I10 ESSENTIAL HYPERTENSION: ICD-10-CM

## 2022-08-08 DIAGNOSIS — R07.89 CHEST DISCOMFORT: Primary | ICD-10-CM

## 2022-08-08 DIAGNOSIS — Z98.61 STATUS POST PERCUTANEOUS TRANSLUMINAL CORONARY ANGIOPLASTY: ICD-10-CM

## 2022-08-08 DIAGNOSIS — I10 ESSENTIAL HYPERTENSION, BENIGN: ICD-10-CM

## 2022-08-08 DIAGNOSIS — Z95.820 STATUS POST ANGIOPLASTY WITH STENT: ICD-10-CM

## 2022-08-08 DIAGNOSIS — I25.10 CORONARY ARTERY DISEASE, NON-OCCLUSIVE: ICD-10-CM

## 2022-08-08 DIAGNOSIS — E78.2 MIXED HYPERLIPIDEMIA: ICD-10-CM

## 2022-08-08 DIAGNOSIS — R06.02 SHORTNESS OF BREATH: ICD-10-CM

## 2022-08-08 PROCEDURE — 93000 ELECTROCARDIOGRAM COMPLETE: CPT | Performed by: INTERNAL MEDICINE

## 2022-08-08 PROCEDURE — 99214 OFFICE O/P EST MOD 30 MIN: CPT | Performed by: INTERNAL MEDICINE

## 2022-08-08 RX ORDER — ONDANSETRON HYDROCHLORIDE 8 MG/1
TABLET, FILM COATED ORAL
COMMUNITY
Start: 2022-07-20

## 2022-08-08 NOTE — PROGRESS NOTES
Encounter Date:08/08/2022  Last seen 5/9/2022      Patient ID: Sol Milton is a 87 y.o. female.    Chief Complaint:  Status post stent  Hypertension  Hypothyroidism        History of Present Illness  Recently patient had substernal discomfort associated with indigestion while she was resting in the car.  Lasted for 2 to 3 hours.  No other associated aggravating or alleviating factors.    Since I have last seen, the patient has been without any shortness of breath, palpitations, dizziness or syncope.  Denies having any headache ,abdominal pain ,nausea, vomiting , diarrhea constipation, loss of weight or loss of appetite.  Denies having any excessive bruising ,hematuria or blood in the stool.    Review of all systems negative except as indicated.    Reviewed ROS.    Assessment and Plan         ////////////////////////  Impression  -------------  -Chest discomfort-possible angina pectoris     status post stent to RCA for totally occluded vessel level in 10/2009.       History of proximal inferior wall hypokinesis with ejection fraction of 55-60%.  60% mid LAD distal to diagonal branch 50-60% mid circumflex 60% ostial marginal and 60% distal circumflex coronary artery disease.     Echocardiogram-normal 9/22/2021.  Stress Cardiolite test-normal 9/22/2021      stress Cardiolite test 04/24/2018 showed mild apical infarction without ischemia     -hypothyroidism hypertension GERD and asthma     -Atrial fibrillation-new onset at last visit.  Patient is in sinus rhythm today 9/22/2021     -allergy to penicillin     -family history of coronary artery disease.  -----------------    Plan  ------------  Recent chest discomfort-possible angina pectoris.  Status post stent  EKG showed sinus rhythm without ischemic changes  Stress Cardiolite test  Echocardiogram    Occasional paroxysmal atrial fibrillation with controlled ventricular response  Patient is maintaining sinus rhythm.  EKG showed normal sinus rhythm normal ECG-PT  2022     Chronic coronary artery disease-stable  Patient was taken off Plavix and changed to baby aspirin by Dr. Cox primary care physician.     Hypertension-  148/79.  Continue atenolol amlodipine     Hypothyroidism-continue levothyroxine     Medications were reviewed and updated.  Follow-up in the office on the same day.    Further plan will depend on patient's progress.  ///////////                    Diagnosis Plan   1. Chest discomfort  Adult Transthoracic Echo Complete W/ Cont if Necessary Per Protocol    Stress Test With Myocardial Perfusion One Day    ECG 12 Lead   2. Status post angioplasty with stent  Adult Transthoracic Echo Complete W/ Cont if Necessary Per Protocol    Stress Test With Myocardial Perfusion One Day    ECG 12 Lead   3. Essential hypertension, benign  Adult Transthoracic Echo Complete W/ Cont if Necessary Per Protocol    Stress Test With Myocardial Perfusion One Day   4. Mixed hyperlipidemia  Adult Transthoracic Echo Complete W/ Cont if Necessary Per Protocol    Stress Test With Myocardial Perfusion One Day   5. Acquired hypothyroidism  Adult Transthoracic Echo Complete W/ Cont if Necessary Per Protocol    Stress Test With Myocardial Perfusion One Day   6. Status post percutaneous transluminal coronary angioplasty  Adult Transthoracic Echo Complete W/ Cont if Necessary Per Protocol    Stress Test With Myocardial Perfusion One Day   7. Essential hypertension  Adult Transthoracic Echo Complete W/ Cont if Necessary Per Protocol    Stress Test With Myocardial Perfusion One Day   8. PAF (paroxysmal atrial fibrillation) (HCC)  Adult Transthoracic Echo Complete W/ Cont if Necessary Per Protocol    Stress Test With Myocardial Perfusion One Day   9. Shortness of breath  Adult Transthoracic Echo Complete W/ Cont if Necessary Per Protocol    Stress Test With Myocardial Perfusion One Day   10. Coronary artery disease, non-occlusive  Adult Transthoracic Echo Complete W/ Cont if Necessary Per Protocol     Stress Test With Myocardial Perfusion One Day   LAB RESULTS (LAST 7 DAYS)    CBC        BMP        CMP         BNP        TROPONIN        CoAg        Creatinine Clearance  CrCl cannot be calculated (Patient's most recent lab result is older than the maximum 30 days allowed.).    ABG        Radiology  No radiology results for the last day                The following portions of the patient's history were reviewed and updated as appropriate: allergies, current medications, past family history, past medical history, past social history, past surgical history and problem list.    Review of Systems   Constitutional: Negative for fever and malaise/fatigue.   Cardiovascular: Positive for chest pain. Negative for dyspnea on exertion and palpitations.   Respiratory: Negative for cough and shortness of breath.    Skin: Negative for rash.   Gastrointestinal: Negative for abdominal pain, nausea and vomiting.   Neurological: Negative for focal weakness and headaches.   All other systems reviewed and are negative.        Current Outpatient Medications:   •  amLODIPine (NORVASC) 10 MG tablet, , Disp: , Rfl:   •  aspirin 81 MG chewable tablet, Chew 81 mg Daily., Disp: , Rfl:   •  atenolol (TENORMIN) 25 MG tablet, , Disp: , Rfl:   •  Cholecalciferol (VITAMIN D3) 5000 units capsule capsule, VITAMIN D3 5000 UNIT CAPS, Disp: , Rfl:   •  clopidogrel (PLAVIX) 75 MG tablet, , Disp: , Rfl:   •  ezetimibe (ZETIA) 10 MG tablet, , Disp: , Rfl:   •  levothyroxine (SYNTHROID, LEVOTHROID) 88 MCG tablet, SYNTHROID 88 MCG TABS, Disp: , Rfl:   •  Loratadine 10 MG capsule, CLARITIN 10 MG CAPS, Disp: , Rfl:   •  Multiple Vitamins-Minerals (MULTI VITAMIN/MINERALS) tablet, MULTI VITAMIN/MINERALS TABS, Disp: , Rfl:   •  ondansetron (ZOFRAN) 8 MG tablet, TAKE 1/2 TABLET BY MOUTH EVERY 4 HOURS FOR 10 DAYS AS NEEDED FOR NAUSEA, Disp: , Rfl:   •  raloxifene (EVISTA) 60 MG tablet, Take 1 tablet by mouth Daily., Disp: , Rfl:     Allergies   Allergen  "Reactions   • Adhesive Tape Itching     While wearing holter monitor itching to site where adhesive is  to skin   • Penicillin G Rash   • Prednisone Nausea And Vomiting       History reviewed. No pertinent family history.    Past Surgical History:   Procedure Laterality Date   • CARDIAC SURGERY     • EYE SURGERY         Past Medical History:   Diagnosis Date   • Disease of thyroid gland    • Hyperlipidemia    • Hypertension        History reviewed. No pertinent family history.    Social History     Socioeconomic History   • Marital status:    Tobacco Use   • Smoking status: Never Smoker   • Smokeless tobacco: Never Used   Vaping Use   • Vaping Use: Never used   Substance and Sexual Activity   • Alcohol use: No   • Drug use: Defer   • Sexual activity: Defer           ECG 12 Lead    Date/Time: 8/8/2022 2:18 PM  Performed by: Valdo Atkins MD  Authorized by: Valdo Atkins MD   Comparison: compared with previous ECG   Similar to previous ECG  Comparison to previous ECG: Normal sinus rhythm normal ECG 73/min normal axis normal intervals no ectopy no significant change from 5/9/2022                Objective:       Physical Exam    /79 (BP Location: Right arm, Patient Position: Sitting, Cuff Size: Adult)   Pulse 74   Ht 149.9 cm (59\")   Wt 51.5 kg (113 lb 9.6 oz)   SpO2 100%   BMI 22.94 kg/m²   The patient is alert, oriented and in no distress.    Vital signs as noted above.    Head and neck revealed no carotid bruits or jugular venous distension.  No thyromegaly or lymphadenopathy is present.    Lungs clear.  No wheezing.  Breath sounds are normal bilaterally.    Heart normal first and second heart sounds.  No murmur..  No pericardial rub is present.  No gallop is present.    Abdomen soft and nontender.  No organomegaly is present.    Extremities revealed good peripheral pulses without any pedal edema.    Skin warm and dry.    Musculoskeletal system is grossly normal.    CNS grossly " normal.    Reviewed and updated.

## 2022-08-26 NOTE — PROGRESS NOTES
Encounter Date:08/30/2022  Last seen 8/8/2022      Patient ID: Sol Milton is a 87 y.o. female.    Chief Complaint:  Status post stent  Hypertension  Hypothyroidism        History of Present Illness  Patient recently was seen with following history.  Reviewed and updated.    Recently patient had substernal discomfort associated with indigestion while she was resting in the car.  Lasted for 2 to 3 hours.  No other associated aggravating or alleviating factors.     Since I have last seen, the patient has been without any shortness of breath, palpitations, dizziness or syncope.  Denies having any headache ,abdominal pain ,nausea, vomiting , diarrhea constipation, loss of weight or loss of appetite.  Denies having any excessive bruising ,hematuria or blood in the stool.     Review of all systems negative except as indicated.     Reviewed ROS.    Assessment and Plan         ////////////////////////  Impression  -------------  -Chest discomfort-possible angina pectoris      status post stent to RCA for totally occluded vessel level in 10/2009.       History of proximal inferior wall hypokinesis with ejection fraction of 55-60%.  60% mid LAD distal to diagonal branch 50-60% mid circumflex 60% ostial marginal and 60% distal circumflex coronary artery disease.    Echocardiogram 8/30/2022-mild mitral regurgitation left atrial enlargement and normal left ventricular function.  Stress Cardiolite test-8/30/2022-normal     Echocardiogram-normal 9/22/2021.  Stress Cardiolite test-normal 9/22/2021      stress Cardiolite test 04/24/2018 showed mild apical infarction without ischemia     -hypothyroidism hypertension GERD and asthma     -Atrial fibrillation-new onset at last visit.  Patient is in sinus rhythm today 9/22/2021     -allergy to penicillin     -family history of coronary artery disease.  -----------------    Plan  ------------  Recent chest discomfort-possible angina pectoris.  Status post stent  Recent EKG showed sinus  rhythm without ischemic changes  Stress Cardiolite test-as above  Echocardiogram-as above     Occasional paroxysmal atrial fibrillation with controlled ventricular response  Patient is maintaining sinus rhythm.  EKG showed normal sinus rhythm normal ECG-PT 2022     Chronic coronary artery disease-stable  Patient was taken off Plavix and changed to baby aspirin by Dr. Cox primary care physician.     Hypertension-   148/70  Continue atenolol amlodipine     Hypothyroidism-continue levothyroxine     Medications were reviewed and updated.    Patient was educated regarding the results of the testing.    Follow-up in the office  in 6 weeks.  Consider cardiac catheterization if patient has continued symptoms.    Further plan will depend on patient's progress.  ///////////                            Diagnosis Plan   1. Status post angioplasty with stent     2. Essential hypertension, benign     3. Mixed hyperlipidemia     4. Acquired hypothyroidism     5. Shortness of breath     6. Chest discomfort     7. Coronary artery disease, non-occlusive     LAB RESULTS (LAST 7 DAYS)    CBC        BMP        CMP         BNP        TROPONIN        CoAg        Creatinine Clearance  CrCl cannot be calculated (Patient's most recent lab result is older than the maximum 30 days allowed.).    ABG        Radiology  No radiology results for the last day                The following portions of the patient's history were reviewed and updated as appropriate: allergies, current medications, past family history, past medical history, past social history, past surgical history and problem list.    ROS      Current Outpatient Medications:   •  amLODIPine (NORVASC) 10 MG tablet, , Disp: , Rfl:   •  aspirin 81 MG chewable tablet, Chew 81 mg Daily., Disp: , Rfl:   •  atenolol (TENORMIN) 25 MG tablet, , Disp: , Rfl:   •  Cholecalciferol (VITAMIN D3) 5000 units capsule capsule, VITAMIN D3 5000 UNIT CAPS, Disp: , Rfl:   •  clopidogrel (PLAVIX) 75 MG  tablet, , Disp: , Rfl:   •  ezetimibe (ZETIA) 10 MG tablet, , Disp: , Rfl:   •  levothyroxine (SYNTHROID, LEVOTHROID) 88 MCG tablet, SYNTHROID 88 MCG TABS, Disp: , Rfl:   •  Loratadine 10 MG capsule, CLARITIN 10 MG CAPS, Disp: , Rfl:   •  Multiple Vitamins-Minerals (MULTI VITAMIN/MINERALS) tablet, MULTI VITAMIN/MINERALS TABS, Disp: , Rfl:   •  ondansetron (ZOFRAN) 8 MG tablet, TAKE 1/2 TABLET BY MOUTH EVERY 4 HOURS FOR 10 DAYS AS NEEDED FOR NAUSEA, Disp: , Rfl:   •  raloxifene (EVISTA) 60 MG tablet, Take 1 tablet by mouth Daily., Disp: , Rfl:     Allergies   Allergen Reactions   • Adhesive Tape Itching     While wearing holter monitor itching to site where adhesive is  to skin   • Penicillin G Rash   • Prednisone Nausea And Vomiting       No family history on file.    Past Surgical History:   Procedure Laterality Date   • CARDIAC SURGERY     • EYE SURGERY         Past Medical History:   Diagnosis Date   • Disease of thyroid gland    • Hyperlipidemia    • Hypertension        No family history on file.    Social History     Socioeconomic History   • Marital status:    Tobacco Use   • Smoking status: Never Smoker   • Smokeless tobacco: Never Used   Vaping Use   • Vaping Use: Never used   Substance and Sexual Activity   • Alcohol use: No   • Drug use: Defer   • Sexual activity: Defer         Procedures      Objective:       Physical Exam    There were no vitals taken for this visit.  The patient is alert, oriented and in no distress.    Vital signs as noted above.    Head and neck revealed no carotid bruits or jugular venous distension.  No thyromegaly or lymphadenopathy is present.    Lungs clear.  No wheezing.  Breath sounds are normal bilaterally.    Heart normal first and second heart sounds.  No murmur..  No pericardial rub is present.  No gallop is present.    Abdomen soft and nontender.  No organomegaly is present.    Extremities revealed good peripheral pulses without any pedal edema.    Skin warm  and dry.    Musculoskeletal system is grossly normal.    CNS grossly normal.    Reviewed and updated.

## 2022-08-30 ENCOUNTER — HOSPITAL ENCOUNTER (OUTPATIENT)
Dept: CARDIOLOGY | Facility: HOSPITAL | Age: 87
Discharge: HOME | End: 2022-08-30

## 2022-08-30 ENCOUNTER — OFFICE VISIT (OUTPATIENT)
Dept: CARDIOLOGY | Facility: CLINIC | Age: 87
End: 2022-08-30

## 2022-08-30 VITALS
HEART RATE: 71 BPM | WEIGHT: 113 LBS | HEIGHT: 59 IN | DIASTOLIC BLOOD PRESSURE: 70 MMHG | SYSTOLIC BLOOD PRESSURE: 148 MMHG | BODY MASS INDEX: 22.78 KG/M2

## 2022-08-30 VITALS — HEIGHT: 59 IN | WEIGHT: 113 LBS | BODY MASS INDEX: 22.78 KG/M2

## 2022-08-30 DIAGNOSIS — R06.02 SHORTNESS OF BREATH: ICD-10-CM

## 2022-08-30 DIAGNOSIS — I48.0 PAF (PAROXYSMAL ATRIAL FIBRILLATION): ICD-10-CM

## 2022-08-30 DIAGNOSIS — I10 ESSENTIAL HYPERTENSION, BENIGN: ICD-10-CM

## 2022-08-30 DIAGNOSIS — E03.9 ACQUIRED HYPOTHYROIDISM: ICD-10-CM

## 2022-08-30 DIAGNOSIS — I25.10 CORONARY ARTERY DISEASE, NON-OCCLUSIVE: ICD-10-CM

## 2022-08-30 DIAGNOSIS — I10 ESSENTIAL HYPERTENSION: ICD-10-CM

## 2022-08-30 DIAGNOSIS — Z95.820 STATUS POST ANGIOPLASTY WITH STENT: ICD-10-CM

## 2022-08-30 DIAGNOSIS — Z98.61 STATUS POST PERCUTANEOUS TRANSLUMINAL CORONARY ANGIOPLASTY: ICD-10-CM

## 2022-08-30 DIAGNOSIS — E78.2 MIXED HYPERLIPIDEMIA: ICD-10-CM

## 2022-08-30 DIAGNOSIS — Z95.820 STATUS POST ANGIOPLASTY WITH STENT: Primary | ICD-10-CM

## 2022-08-30 DIAGNOSIS — R07.89 CHEST DISCOMFORT: ICD-10-CM

## 2022-08-30 LAB
BH CV ECHO MEAS - ACS: 1.5 CM
BH CV ECHO MEAS - AI P1/2T: 564.6 MSEC
BH CV ECHO MEAS - AO MAX PG: 9.6 MMHG
BH CV ECHO MEAS - AO MEAN PG: 5 MMHG
BH CV ECHO MEAS - AO ROOT DIAM: 2.7 CM
BH CV ECHO MEAS - AO V2 MAX: 155.1 CM/SEC
BH CV ECHO MEAS - AO V2 VTI: 35.8 CM
BH CV ECHO MEAS - AVA(I,D): 1.34 CM2
BH CV ECHO MEAS - EDV(CUBED): 54 ML
BH CV ECHO MEAS - EDV(MOD-SP4): 58.3 ML
BH CV ECHO MEAS - EF(MOD-SP4): 62.8 %
BH CV ECHO MEAS - ESV(CUBED): 17 ML
BH CV ECHO MEAS - ESV(MOD-SP4): 21.7 ML
BH CV ECHO MEAS - FS: 32 %
BH CV ECHO MEAS - IVS/LVPW: 1.11 CM
BH CV ECHO MEAS - IVSD: 1.18 CM
BH CV ECHO MEAS - LA DIMENSION: 4.8 CM
BH CV ECHO MEAS - LV DIASTOLIC VOL/BSA (35-75): 40.3 CM2
BH CV ECHO MEAS - LV MASS(C)D: 136.9 GRAMS
BH CV ECHO MEAS - LV MAX PG: 2.7 MMHG
BH CV ECHO MEAS - LV MEAN PG: 1.4 MMHG
BH CV ECHO MEAS - LV SYSTOLIC VOL/BSA (12-30): 15 CM2
BH CV ECHO MEAS - LV V1 MAX: 81.9 CM/SEC
BH CV ECHO MEAS - LV V1 VTI: 21.2 CM
BH CV ECHO MEAS - LVIDD: 3.8 CM
BH CV ECHO MEAS - LVIDS: 2.6 CM
BH CV ECHO MEAS - LVOT AREA: 2.26 CM2
BH CV ECHO MEAS - LVOT DIAM: 1.7 CM
BH CV ECHO MEAS - LVPWD: 1.06 CM
BH CV ECHO MEAS - MV A MAX VEL: 55.6 CM/SEC
BH CV ECHO MEAS - MV DEC SLOPE: 571.3 CM/SEC2
BH CV ECHO MEAS - MV DEC TIME: 0.19 MSEC
BH CV ECHO MEAS - MV E MAX VEL: 106.8 CM/SEC
BH CV ECHO MEAS - MV E/A: 1.92
BH CV ECHO MEAS - MV MAX PG: 6.4 MMHG
BH CV ECHO MEAS - MV MEAN PG: 2.15 MMHG
BH CV ECHO MEAS - MV V2 VTI: 35.5 CM
BH CV ECHO MEAS - MVA(VTI): 1.35 CM2
BH CV ECHO MEAS - PA ACC TIME: 0.09 SEC
BH CV ECHO MEAS - PA PR(ACCEL): 37 MMHG
BH CV ECHO MEAS - PA V2 MAX: 65.1 CM/SEC
BH CV ECHO MEAS - PI END-D VEL: 50.8 CM/SEC
BH CV ECHO MEAS - RAP SYSTOLE: 3 MMHG
BH CV ECHO MEAS - RV MAX PG: 1.11 MMHG
BH CV ECHO MEAS - RV V1 MAX: 52.7 CM/SEC
BH CV ECHO MEAS - RV V1 VTI: 10.8 CM
BH CV ECHO MEAS - RVDD: 2.7 CM
BH CV ECHO MEAS - RVSP: 26.8 MMHG
BH CV ECHO MEAS - SI(MOD-SP4): 25.3 ML/M2
BH CV ECHO MEAS - SV(LVOT): 48 ML
BH CV ECHO MEAS - SV(MOD-SP4): 36.7 ML
BH CV ECHO MEAS - TR MAX PG: 23.8 MMHG
BH CV ECHO MEAS - TR MAX VEL: 242.8 CM/SEC
BH CV REST NUCLEAR ISOTOPE DOSE: 10.8 MCI
BH CV STRESS BP STAGE 1: NORMAL
BH CV STRESS BP STAGE 2: NORMAL
BH CV STRESS DURATION MIN STAGE 1: 3
BH CV STRESS DURATION MIN STAGE 2: 2
BH CV STRESS DURATION SEC STAGE 1: 0
BH CV STRESS DURATION SEC STAGE 2: 30
BH CV STRESS GRADE STAGE 1: 10
BH CV STRESS GRADE STAGE 2: 12
BH CV STRESS HR STAGE 1: 70
BH CV STRESS HR STAGE 2: 70
BH CV STRESS METS STAGE 1: 5
BH CV STRESS METS STAGE 2: 7.5
BH CV STRESS NUCLEAR ISOTOPE DOSE: 32 MCI
BH CV STRESS PROTOCOL 1: NORMAL
BH CV STRESS RECOVERY BP: NORMAL MMHG
BH CV STRESS RECOVERY HR: 69 BPM
BH CV STRESS SPEED STAGE 1: 1.7
BH CV STRESS SPEED STAGE 2: 2.5
BH CV STRESS STAGE 1: 1
BH CV STRESS STAGE 2: 2
LV EF 2D ECHO EST: 60 %
MAXIMAL PREDICTED HEART RATE: 135 BPM
MAXIMAL PREDICTED HEART RATE: 135 BPM
PERCENT MAX PREDICTED HR: 53.33 %
STRESS BASELINE BP: NORMAL MMHG
STRESS BASELINE HR: 64 BPM
STRESS PERCENT HR: 63 %
STRESS POST EXERCISE DUR MIN: 5 MIN
STRESS POST EXERCISE DUR SEC: 30 SEC
STRESS POST PEAK BP: NORMAL MMHG
STRESS POST PEAK HR: 72 BPM
STRESS TARGET HR: 115 BPM
STRESS TARGET HR: 115 BPM

## 2022-08-30 PROCEDURE — A9500 TC99M SESTAMIBI: HCPCS | Performed by: INTERNAL MEDICINE

## 2022-08-30 PROCEDURE — 78452 HT MUSCLE IMAGE SPECT MULT: CPT | Performed by: INTERNAL MEDICINE

## 2022-08-30 PROCEDURE — 99213 OFFICE O/P EST LOW 20 MIN: CPT | Performed by: INTERNAL MEDICINE

## 2022-08-30 PROCEDURE — 93018 CV STRESS TEST I&R ONLY: CPT | Performed by: INTERNAL MEDICINE

## 2022-08-30 PROCEDURE — 0 TECHNETIUM SESTAMIBI: Performed by: INTERNAL MEDICINE

## 2022-08-30 PROCEDURE — 93016 CV STRESS TEST SUPVJ ONLY: CPT | Performed by: INTERNAL MEDICINE

## 2022-08-30 PROCEDURE — 93306 TTE W/DOPPLER COMPLETE: CPT

## 2022-08-30 PROCEDURE — 93306 TTE W/DOPPLER COMPLETE: CPT | Performed by: INTERNAL MEDICINE

## 2022-08-30 PROCEDURE — 93017 CV STRESS TEST TRACING ONLY: CPT

## 2022-08-30 PROCEDURE — 78452 HT MUSCLE IMAGE SPECT MULT: CPT

## 2022-08-30 RX ORDER — ESCITALOPRAM OXALATE 10 MG/1
10 TABLET ORAL DAILY
COMMUNITY
Start: 2022-08-17

## 2022-08-30 RX ORDER — HYDROXYZINE HYDROCHLORIDE 10 MG/1
TABLET, FILM COATED ORAL
COMMUNITY
Start: 2022-08-17

## 2022-08-30 RX ADMIN — TECHNETIUM TC 99M SESTAMIBI 1 DOSE: 1 INJECTION INTRAVENOUS at 10:21

## 2022-08-30 RX ADMIN — TECHNETIUM TC 99M SESTAMIBI 1 DOSE: 1 INJECTION INTRAVENOUS at 08:31

## 2022-08-31 ENCOUNTER — TELEPHONE (OUTPATIENT)
Dept: CARDIOLOGY | Facility: CLINIC | Age: 87
End: 2022-08-31

## 2022-10-05 ENCOUNTER — TRANSCRIBE ORDERS (OUTPATIENT)
Dept: ADMINISTRATIVE | Facility: HOSPITAL | Age: 87
End: 2022-10-05

## 2022-10-05 ENCOUNTER — LAB (OUTPATIENT)
Dept: LAB | Facility: HOSPITAL | Age: 87
End: 2022-10-05

## 2022-10-05 DIAGNOSIS — M19.90 SENILE ARTHRITIS: ICD-10-CM

## 2022-10-05 DIAGNOSIS — I25.10 DISEASE OF CARDIOVASCULAR SYSTEM: ICD-10-CM

## 2022-10-05 DIAGNOSIS — I48.91 ATRIAL FIBRILLATION, UNSPECIFIED TYPE: ICD-10-CM

## 2022-10-05 DIAGNOSIS — E78.81 LIPOID DERMATOARTHRITIS: ICD-10-CM

## 2022-10-05 DIAGNOSIS — R79.89 HYPOURICEMIA: ICD-10-CM

## 2022-10-05 DIAGNOSIS — I51.9 MYXEDEMA HEART DISEASE: ICD-10-CM

## 2022-10-05 DIAGNOSIS — E03.9 MYXEDEMA HEART DISEASE: ICD-10-CM

## 2022-10-05 DIAGNOSIS — E03.9 MYXEDEMA HEART DISEASE: Primary | ICD-10-CM

## 2022-10-05 DIAGNOSIS — I51.9 MYXEDEMA HEART DISEASE: Primary | ICD-10-CM

## 2022-10-05 LAB
25(OH)D3 SERPL-MCNC: 61.1 NG/ML (ref 30–100)
ALBUMIN SERPL-MCNC: 4.1 G/DL (ref 3.5–5.2)
ALBUMIN/GLOB SERPL: 1.6 G/DL
ALP SERPL-CCNC: 46 U/L (ref 39–117)
ALT SERPL W P-5'-P-CCNC: 11 U/L (ref 1–33)
ANION GAP SERPL CALCULATED.3IONS-SCNC: 8 MMOL/L (ref 5–15)
AST SERPL-CCNC: 21 U/L (ref 1–32)
BASOPHILS # BLD AUTO: 0.07 10*3/MM3 (ref 0–0.2)
BASOPHILS NFR BLD AUTO: 1 % (ref 0–1.5)
BILIRUB SERPL-MCNC: 0.5 MG/DL (ref 0–1.2)
BUN SERPL-MCNC: 18 MG/DL (ref 8–23)
BUN/CREAT SERPL: 15.8 (ref 7–25)
CALCIUM SPEC-SCNC: 9.1 MG/DL (ref 8.6–10.5)
CHLORIDE SERPL-SCNC: 103 MMOL/L (ref 98–107)
CHOLEST SERPL-MCNC: 185 MG/DL (ref 0–200)
CO2 SERPL-SCNC: 31 MMOL/L (ref 22–29)
CREAT SERPL-MCNC: 1.14 MG/DL (ref 0.57–1)
DEPRECATED RDW RBC AUTO: 45.4 FL (ref 37–54)
EGFRCR SERPLBLD CKD-EPI 2021: 47.3 ML/MIN/1.73
EOSINOPHIL # BLD AUTO: 0.23 10*3/MM3 (ref 0–0.4)
EOSINOPHIL NFR BLD AUTO: 3.2 % (ref 0.3–6.2)
ERYTHROCYTE [DISTWIDTH] IN BLOOD BY AUTOMATED COUNT: 13.1 % (ref 12.3–15.4)
GLOBULIN UR ELPH-MCNC: 2.5 GM/DL
GLUCOSE SERPL-MCNC: 96 MG/DL (ref 65–99)
HCT VFR BLD AUTO: 35.9 % (ref 34–46.6)
HDLC SERPL-MCNC: 77 MG/DL (ref 40–60)
HGB BLD-MCNC: 11.9 G/DL (ref 12–15.9)
IMM GRANULOCYTES # BLD AUTO: 0.02 10*3/MM3 (ref 0–0.05)
IMM GRANULOCYTES NFR BLD AUTO: 0.3 % (ref 0–0.5)
LDLC SERPL CALC-MCNC: 92 MG/DL (ref 0–100)
LDLC/HDLC SERPL: 1.17 {RATIO}
LYMPHOCYTES # BLD AUTO: 1.79 10*3/MM3 (ref 0.7–3.1)
LYMPHOCYTES NFR BLD AUTO: 24.5 % (ref 19.6–45.3)
MCH RBC QN AUTO: 30.7 PG (ref 26.6–33)
MCHC RBC AUTO-ENTMCNC: 33.1 G/DL (ref 31.5–35.7)
MCV RBC AUTO: 92.5 FL (ref 79–97)
MONOCYTES # BLD AUTO: 0.62 10*3/MM3 (ref 0.1–0.9)
MONOCYTES NFR BLD AUTO: 8.5 % (ref 5–12)
NEUTROPHILS NFR BLD AUTO: 4.57 10*3/MM3 (ref 1.7–7)
NEUTROPHILS NFR BLD AUTO: 62.5 % (ref 42.7–76)
NRBC BLD AUTO-RTO: 0 /100 WBC (ref 0–0.2)
PLATELET # BLD AUTO: 224 10*3/MM3 (ref 140–450)
PMV BLD AUTO: 12.3 FL (ref 6–12)
POTASSIUM SERPL-SCNC: 4.9 MMOL/L (ref 3.5–5.2)
PROT SERPL-MCNC: 6.6 G/DL (ref 6–8.5)
RBC # BLD AUTO: 3.88 10*6/MM3 (ref 3.77–5.28)
SODIUM SERPL-SCNC: 142 MMOL/L (ref 136–145)
TRIGL SERPL-MCNC: 89 MG/DL (ref 0–150)
TSH SERPL DL<=0.05 MIU/L-ACNC: 2.89 UIU/ML (ref 0.27–4.2)
VLDLC SERPL-MCNC: 16 MG/DL (ref 5–40)
WBC NRBC COR # BLD: 7.3 10*3/MM3 (ref 3.4–10.8)

## 2022-10-05 PROCEDURE — 82306 VITAMIN D 25 HYDROXY: CPT

## 2022-10-05 PROCEDURE — 80053 COMPREHEN METABOLIC PANEL: CPT

## 2022-10-05 PROCEDURE — 80061 LIPID PANEL: CPT

## 2022-10-05 PROCEDURE — 84443 ASSAY THYROID STIM HORMONE: CPT

## 2022-10-05 PROCEDURE — 36415 COLL VENOUS BLD VENIPUNCTURE: CPT

## 2022-10-05 PROCEDURE — 85025 COMPLETE CBC W/AUTO DIFF WBC: CPT

## 2022-10-11 ENCOUNTER — OFFICE VISIT (OUTPATIENT)
Dept: CARDIOLOGY | Facility: CLINIC | Age: 87
End: 2022-10-11

## 2022-10-11 VITALS
OXYGEN SATURATION: 98 % | SYSTOLIC BLOOD PRESSURE: 161 MMHG | WEIGHT: 117 LBS | DIASTOLIC BLOOD PRESSURE: 76 MMHG | HEART RATE: 70 BPM | HEIGHT: 59 IN | BODY MASS INDEX: 23.59 KG/M2

## 2022-10-11 DIAGNOSIS — Z98.61 STATUS POST PERCUTANEOUS TRANSLUMINAL CORONARY ANGIOPLASTY: ICD-10-CM

## 2022-10-11 DIAGNOSIS — R06.02 SHORTNESS OF BREATH: ICD-10-CM

## 2022-10-11 DIAGNOSIS — I48.0 PAF (PAROXYSMAL ATRIAL FIBRILLATION): ICD-10-CM

## 2022-10-11 DIAGNOSIS — R07.89 CHEST DISCOMFORT: ICD-10-CM

## 2022-10-11 DIAGNOSIS — Z95.820 STATUS POST ANGIOPLASTY WITH STENT: Primary | ICD-10-CM

## 2022-10-11 DIAGNOSIS — E78.2 MIXED HYPERLIPIDEMIA: ICD-10-CM

## 2022-10-11 DIAGNOSIS — I10 ESSENTIAL HYPERTENSION, BENIGN: ICD-10-CM

## 2022-10-11 DIAGNOSIS — R53.83 FATIGUE, UNSPECIFIED TYPE: ICD-10-CM

## 2022-10-11 DIAGNOSIS — E03.9 ACQUIRED HYPOTHYROIDISM: ICD-10-CM

## 2022-10-11 DIAGNOSIS — I25.10 CORONARY ARTERY DISEASE, NON-OCCLUSIVE: ICD-10-CM

## 2022-10-11 PROCEDURE — 99214 OFFICE O/P EST MOD 30 MIN: CPT | Performed by: INTERNAL MEDICINE

## 2022-10-11 NOTE — PROGRESS NOTES
Encounter Date:10/11/2022  Last seen 8/30/2022      Patient ID: Sol Milton is a 87 y.o. female.    Chief Complaint:  Status post stent  Hypertension  Hypothyroidism        History of Present Illness  Patient is feeling better since last visit.    Since I have last seen, the patient has been without any chest discomfort ,shortness of breath, palpitations, dizziness or syncope.  Denies having any headache ,abdominal pain ,nausea, vomiting , diarrhea constipation, loss of weight or loss of appetite.  Denies having any excessive bruising ,hematuria or blood in the stool.    Review of all systems negative except as indicated.    Reviewed ROS.  Assessment and Plan         ////////////////////////  Impression  -------------  -Chest discomfort-possible angina pectoris-improved      status post stent to RCA for totally occluded vessel level in 10/2009.       History of proximal inferior wall hypokinesis with ejection fraction of 55-60%.  60% mid LAD distal to diagonal branch 50-60% mid circumflex 60% ostial marginal and 60% distal circumflex coronary artery disease.     Echocardiogram 8/30/2022-mild mitral regurgitation left atrial enlargement and normal left ventricular function.  Stress Cardiolite test-8/30/2022-normal     Echocardiogram-normal 9/22/2021.  Stress Cardiolite test-normal 9/22/2021      stress Cardiolite test 04/24/2018 showed mild apical infarction without ischemia     -hypothyroidism hypertension GERD and asthma     -Atrial fibrillation-new onset at last visit.  Patient is in sinus rhythm today 9/22/2021     -allergy to penicillin     -family history of coronary artery disease.  -----------------    Plan  ------------  History of chest discomfort-possible angina pectoris.-Improved  Status post stent  Recent EKG showed sinus rhythm without ischemic changes    Stress Cardiolite test-as above  Echocardiogram-as above     Occasional paroxysmal atrial fibrillation with controlled ventricular  response  Patient is maintaining sinus rhythm.  EKG showed normal sinus rhythm normal ECG- 8/8/2022    Chronic coronary artery disease-stable  Patient was taken off Plavix and changed to baby aspirin by Dr. Cox primary care physician.     Hypertension- 160/70  Continue atenolol amlodipine.  Patient has not taken her blood pressure medications this morning.     Hypothyroidism-continue levothyroxine     Medications were reviewed and updated.     Follow-up in the office in 6 months.  No need for cardiac catheterization at this time.     Further plan will depend on patient's progress.  ///////////                          No diagnosis found.LAB RESULTS (LAST 7 DAYS)    CBC  Results from last 7 days   Lab Units 10/05/22  1034   WBC 10*3/mm3 7.30   RBC 10*6/mm3 3.88   HEMOGLOBIN g/dL 11.9*   HEMATOCRIT % 35.9   MCV fL 92.5   PLATELETS 10*3/mm3 224       BMP  Results from last 7 days   Lab Units 10/05/22  1034   SODIUM mmol/L 142   POTASSIUM mmol/L 4.9   CHLORIDE mmol/L 103   CO2 mmol/L 31.0*   BUN mg/dL 18   CREATININE mg/dL 1.14*   GLUCOSE mg/dL 96       CMP   Results from last 7 days   Lab Units 10/05/22  1034   SODIUM mmol/L 142   POTASSIUM mmol/L 4.9   CHLORIDE mmol/L 103   CO2 mmol/L 31.0*   BUN mg/dL 18   CREATININE mg/dL 1.14*   GLUCOSE mg/dL 96   ALBUMIN g/dL 4.10   BILIRUBIN mg/dL 0.5   ALK PHOS U/L 46   AST (SGOT) U/L 21   ALT (SGPT) U/L 11         BNP        TROPONIN        CoAg        Creatinine Clearance  Estimated Creatinine Clearance: 30.2 mL/min (A) (by C-G formula based on SCr of 1.14 mg/dL (H)).    ABG        Radiology  No radiology results for the last day                The following portions of the patient's history were reviewed and updated as appropriate: allergies, current medications, past family history, past medical history, past social history, past surgical history and problem list.    Review of Systems   Constitutional: Negative for malaise/fatigue.   Cardiovascular: Negative for chest  pain, leg swelling, palpitations and syncope.   Respiratory: Negative for shortness of breath.    Skin: Negative for rash.   Gastrointestinal: Negative for nausea and vomiting.   Neurological: Negative for dizziness, light-headedness and numbness.   All other systems reviewed and are negative.        Current Outpatient Medications:   •  amLODIPine (NORVASC) 10 MG tablet, , Disp: , Rfl:   •  aspirin 81 MG chewable tablet, Chew 81 mg Daily., Disp: , Rfl:   •  atenolol (TENORMIN) 25 MG tablet, , Disp: , Rfl:   •  Cholecalciferol (VITAMIN D3) 5000 units capsule capsule, VITAMIN D3 5000 UNIT CAPS, Disp: , Rfl:   •  clopidogrel (PLAVIX) 75 MG tablet, , Disp: , Rfl:   •  escitalopram (LEXAPRO) 10 MG tablet, Take 10 mg by mouth Daily., Disp: , Rfl:   •  ezetimibe (ZETIA) 10 MG tablet, , Disp: , Rfl:   •  hydrOXYzine (ATARAX) 10 MG tablet, TAKE 2 TABLETS BY MOUTH ONCE DAILY AT BEDTIME AS NEEDED FOR SLEEP. (PLEASE START WITH ONE TABLET AND INCREASE TO 2 TABLETS IF NEEDED), Disp: , Rfl:   •  levothyroxine (SYNTHROID, LEVOTHROID) 88 MCG tablet, SYNTHROID 88 MCG TABS, Disp: , Rfl:   •  Loratadine 10 MG capsule, CLARITIN 10 MG CAPS, Disp: , Rfl:   •  Multiple Vitamins-Minerals (MULTI VITAMIN/MINERALS) tablet, MULTI VITAMIN/MINERALS TABS, Disp: , Rfl:   •  ondansetron (ZOFRAN) 8 MG tablet, TAKE 1/2 TABLET BY MOUTH EVERY 4 HOURS FOR 10 DAYS AS NEEDED FOR NAUSEA, Disp: , Rfl:   •  raloxifene (EVISTA) 60 MG tablet, Take 1 tablet by mouth Daily., Disp: , Rfl:     Allergies   Allergen Reactions   • Adhesive Tape Itching     While wearing holter monitor itching to site where adhesive is  to skin   • Penicillin G Rash   • Prednisone Nausea And Vomiting       History reviewed. No pertinent family history.    Past Surgical History:   Procedure Laterality Date   • CARDIAC SURGERY     • EYE SURGERY         Past Medical History:   Diagnosis Date   • Disease of thyroid gland    • Hyperlipidemia    • Hypertension        History reviewed.  "No pertinent family history.    Social History     Socioeconomic History   • Marital status:    Tobacco Use   • Smoking status: Never   • Smokeless tobacco: Never   Vaping Use   • Vaping Use: Never used   Substance and Sexual Activity   • Alcohol use: No   • Drug use: Defer   • Sexual activity: Defer         Procedures      Objective:       Physical Exam    /76 (BP Location: Left arm, Patient Position: Sitting, Cuff Size: Adult) Comment: Patient did not take her BP medication this morning  Pulse 70   Ht 149.9 cm (59\")   Wt 53.1 kg (117 lb)   SpO2 98%   BMI 23.63 kg/m²   The patient is alert, oriented and in no distress.    Vital signs as noted above.    Head and neck revealed no carotid bruits or jugular venous distension.  No thyromegaly or lymphadenopathy is present.    Lungs clear.  No wheezing.  Breath sounds are normal bilaterally.    Heart normal first and second heart sounds.  No murmur..  No pericardial rub is present.  No gallop is present.    Abdomen soft and nontender.  No organomegaly is present.    Extremities revealed good peripheral pulses without any pedal edema.    Skin warm and dry.    Musculoskeletal system is grossly normal.    CNS grossly normal.    Reviewed and updated.        "

## 2023-03-22 ENCOUNTER — LAB (OUTPATIENT)
Dept: LAB | Facility: HOSPITAL | Age: 88
End: 2023-03-22
Payer: MEDICARE

## 2023-03-22 ENCOUNTER — TRANSCRIBE ORDERS (OUTPATIENT)
Dept: ADMINISTRATIVE | Facility: HOSPITAL | Age: 88
End: 2023-03-22
Payer: MEDICARE

## 2023-03-22 DIAGNOSIS — M85.80: ICD-10-CM

## 2023-03-22 DIAGNOSIS — I10 ESSENTIAL HYPERTENSION, MALIGNANT: ICD-10-CM

## 2023-03-22 DIAGNOSIS — E78.81 LIPOID DERMATOARTHRITIS: ICD-10-CM

## 2023-03-22 DIAGNOSIS — I25.10 DISEASE OF CARDIOVASCULAR SYSTEM: ICD-10-CM

## 2023-03-22 DIAGNOSIS — I48.0 PAROXYSMAL ATRIAL FIBRILLATION: ICD-10-CM

## 2023-03-22 DIAGNOSIS — M19.90 SENILE ARTHRITIS: ICD-10-CM

## 2023-03-22 DIAGNOSIS — R79.89 HYPOURICEMIA: ICD-10-CM

## 2023-03-22 DIAGNOSIS — R25.2 MUSCLE CRAMP: ICD-10-CM

## 2023-03-22 DIAGNOSIS — E03.9 MYXEDEMA HEART DISEASE: ICD-10-CM

## 2023-03-22 DIAGNOSIS — I51.9 MYXEDEMA HEART DISEASE: Primary | ICD-10-CM

## 2023-03-22 DIAGNOSIS — E03.9 MYXEDEMA HEART DISEASE: Primary | ICD-10-CM

## 2023-03-22 DIAGNOSIS — I51.9 MYXEDEMA HEART DISEASE: ICD-10-CM

## 2023-03-22 LAB
25(OH)D3 SERPL-MCNC: 58.1 NG/ML (ref 30–100)
ALBUMIN SERPL-MCNC: 4.4 G/DL (ref 3.5–5.2)
ALBUMIN/GLOB SERPL: 1.5 G/DL
ALP SERPL-CCNC: 45 U/L (ref 39–117)
ALT SERPL W P-5'-P-CCNC: 15 U/L (ref 1–33)
ANION GAP SERPL CALCULATED.3IONS-SCNC: 7 MMOL/L (ref 5–15)
AST SERPL-CCNC: 22 U/L (ref 1–32)
BASOPHILS # BLD AUTO: 0.07 10*3/MM3 (ref 0–0.2)
BASOPHILS NFR BLD AUTO: 0.9 % (ref 0–1.5)
BILIRUB SERPL-MCNC: 0.4 MG/DL (ref 0–1.2)
BUN SERPL-MCNC: 28 MG/DL (ref 8–23)
BUN/CREAT SERPL: 27.5 (ref 7–25)
CALCIUM SPEC-SCNC: 9.7 MG/DL (ref 8.6–10.5)
CHLORIDE SERPL-SCNC: 104 MMOL/L (ref 98–107)
CHOLEST SERPL-MCNC: 178 MG/DL (ref 0–200)
CO2 SERPL-SCNC: 29 MMOL/L (ref 22–29)
CREAT SERPL-MCNC: 1.02 MG/DL (ref 0.57–1)
DEPRECATED RDW RBC AUTO: 42.7 FL (ref 37–54)
EGFRCR SERPLBLD CKD-EPI 2021: 53.7 ML/MIN/1.73
EOSINOPHIL # BLD AUTO: 0.23 10*3/MM3 (ref 0–0.4)
EOSINOPHIL NFR BLD AUTO: 3 % (ref 0.3–6.2)
ERYTHROCYTE [DISTWIDTH] IN BLOOD BY AUTOMATED COUNT: 13.1 % (ref 12.3–15.4)
GLOBULIN UR ELPH-MCNC: 2.9 GM/DL
GLUCOSE SERPL-MCNC: 103 MG/DL (ref 65–99)
HCT VFR BLD AUTO: 36.1 % (ref 34–46.6)
HDLC SERPL-MCNC: 62 MG/DL (ref 40–60)
HGB BLD-MCNC: 12.1 G/DL (ref 12–15.9)
IMM GRANULOCYTES # BLD AUTO: 0.02 10*3/MM3 (ref 0–0.05)
IMM GRANULOCYTES NFR BLD AUTO: 0.3 % (ref 0–0.5)
LDLC SERPL CALC-MCNC: 94 MG/DL (ref 0–100)
LDLC/HDLC SERPL: 1.46 {RATIO}
LYMPHOCYTES # BLD AUTO: 2.18 10*3/MM3 (ref 0.7–3.1)
LYMPHOCYTES NFR BLD AUTO: 28.1 % (ref 19.6–45.3)
MAGNESIUM SERPL-MCNC: 2.7 MG/DL (ref 1.6–2.4)
MCH RBC QN AUTO: 29.7 PG (ref 26.6–33)
MCHC RBC AUTO-ENTMCNC: 33.5 G/DL (ref 31.5–35.7)
MCV RBC AUTO: 88.5 FL (ref 79–97)
MONOCYTES # BLD AUTO: 0.57 10*3/MM3 (ref 0.1–0.9)
MONOCYTES NFR BLD AUTO: 7.3 % (ref 5–12)
NEUTROPHILS NFR BLD AUTO: 4.69 10*3/MM3 (ref 1.7–7)
NEUTROPHILS NFR BLD AUTO: 60.4 % (ref 42.7–76)
NRBC BLD AUTO-RTO: 0 /100 WBC (ref 0–0.2)
PLATELET # BLD AUTO: 231 10*3/MM3 (ref 140–450)
PMV BLD AUTO: 11.8 FL (ref 6–12)
POTASSIUM SERPL-SCNC: 4.7 MMOL/L (ref 3.5–5.2)
PROT SERPL-MCNC: 7.3 G/DL (ref 6–8.5)
RBC # BLD AUTO: 4.08 10*6/MM3 (ref 3.77–5.28)
SODIUM SERPL-SCNC: 140 MMOL/L (ref 136–145)
TRIGL SERPL-MCNC: 127 MG/DL (ref 0–150)
TSH SERPL DL<=0.05 MIU/L-ACNC: 3.45 UIU/ML (ref 0.27–4.2)
VLDLC SERPL-MCNC: 22 MG/DL (ref 5–40)
WBC NRBC COR # BLD: 7.76 10*3/MM3 (ref 3.4–10.8)

## 2023-03-22 PROCEDURE — 85025 COMPLETE CBC W/AUTO DIFF WBC: CPT

## 2023-03-22 PROCEDURE — 82306 VITAMIN D 25 HYDROXY: CPT

## 2023-03-22 PROCEDURE — 80053 COMPREHEN METABOLIC PANEL: CPT

## 2023-03-22 PROCEDURE — 83735 ASSAY OF MAGNESIUM: CPT

## 2023-03-22 PROCEDURE — 80061 LIPID PANEL: CPT

## 2023-03-22 PROCEDURE — 36415 COLL VENOUS BLD VENIPUNCTURE: CPT

## 2023-03-22 PROCEDURE — 84443 ASSAY THYROID STIM HORMONE: CPT

## 2023-04-25 ENCOUNTER — OFFICE VISIT (OUTPATIENT)
Dept: CARDIOLOGY | Facility: CLINIC | Age: 88
End: 2023-04-25
Payer: MEDICARE

## 2023-04-25 VITALS
BODY MASS INDEX: 25 KG/M2 | HEART RATE: 66 BPM | HEIGHT: 59 IN | OXYGEN SATURATION: 99 % | SYSTOLIC BLOOD PRESSURE: 151 MMHG | WEIGHT: 124 LBS | DIASTOLIC BLOOD PRESSURE: 76 MMHG

## 2023-04-25 DIAGNOSIS — Z98.61 STATUS POST PERCUTANEOUS TRANSLUMINAL CORONARY ANGIOPLASTY: ICD-10-CM

## 2023-04-25 DIAGNOSIS — I25.10 CORONARY ARTERY DISEASE, NON-OCCLUSIVE: ICD-10-CM

## 2023-04-25 DIAGNOSIS — R07.89 CHEST DISCOMFORT: ICD-10-CM

## 2023-04-25 DIAGNOSIS — I10 ESSENTIAL HYPERTENSION, BENIGN: ICD-10-CM

## 2023-04-25 DIAGNOSIS — I10 ESSENTIAL HYPERTENSION: ICD-10-CM

## 2023-04-25 DIAGNOSIS — E03.9 ACQUIRED HYPOTHYROIDISM: ICD-10-CM

## 2023-04-25 DIAGNOSIS — E78.2 MIXED HYPERLIPIDEMIA: ICD-10-CM

## 2023-04-25 DIAGNOSIS — I48.0 PAF (PAROXYSMAL ATRIAL FIBRILLATION): ICD-10-CM

## 2023-04-25 DIAGNOSIS — Z95.820 STATUS POST ANGIOPLASTY WITH STENT: Primary | ICD-10-CM

## 2023-04-25 DIAGNOSIS — R06.02 SHORTNESS OF BREATH: ICD-10-CM

## 2023-04-25 RX ORDER — CLOBETASOL PROPIONATE 0.46 MG/ML
SOLUTION TOPICAL
COMMUNITY
Start: 2023-01-04

## 2023-09-18 ENCOUNTER — TRANSCRIBE ORDERS (OUTPATIENT)
Dept: ADMINISTRATIVE | Facility: HOSPITAL | Age: 88
End: 2023-09-18
Payer: MEDICARE

## 2023-09-18 ENCOUNTER — LAB (OUTPATIENT)
Dept: LAB | Facility: HOSPITAL | Age: 88
End: 2023-09-18
Payer: MEDICARE

## 2023-09-18 DIAGNOSIS — L40.3 SAPHO SYNDROME: ICD-10-CM

## 2023-09-18 DIAGNOSIS — M54.50 LOW BACK PAIN, UNSPECIFIED BACK PAIN LATERALITY, UNSPECIFIED CHRONICITY, UNSPECIFIED WHETHER SCIATICA PRESENT: ICD-10-CM

## 2023-09-18 DIAGNOSIS — M19.90 SENILE ARTHRITIS: ICD-10-CM

## 2023-09-18 DIAGNOSIS — M86.9 SAPHO SYNDROME: ICD-10-CM

## 2023-09-18 DIAGNOSIS — L70.9 SAPHO SYNDROME: ICD-10-CM

## 2023-09-18 DIAGNOSIS — R41.3 MEMORY LOSS: ICD-10-CM

## 2023-09-18 DIAGNOSIS — I25.10 DISEASE OF CARDIOVASCULAR SYSTEM: ICD-10-CM

## 2023-09-18 DIAGNOSIS — R79.89 HYPOURICEMIA: ICD-10-CM

## 2023-09-18 DIAGNOSIS — M65.9 SAPHO SYNDROME: ICD-10-CM

## 2023-09-18 DIAGNOSIS — I51.9 MYXEDEMA HEART DISEASE: Primary | ICD-10-CM

## 2023-09-18 DIAGNOSIS — E78.81 LIPOID DERMATOARTHRITIS: ICD-10-CM

## 2023-09-18 DIAGNOSIS — I48.0 PAROXYSMAL ATRIAL FIBRILLATION: ICD-10-CM

## 2023-09-18 DIAGNOSIS — I10 ESSENTIAL HYPERTENSION, MALIGNANT: ICD-10-CM

## 2023-09-18 DIAGNOSIS — I51.9 MYXEDEMA HEART DISEASE: ICD-10-CM

## 2023-09-18 DIAGNOSIS — M85.80 SAPHO SYNDROME: ICD-10-CM

## 2023-09-18 DIAGNOSIS — E03.9 MYXEDEMA HEART DISEASE: Primary | ICD-10-CM

## 2023-09-18 DIAGNOSIS — E55.9 VITAMIN D DEFICIENCY: ICD-10-CM

## 2023-09-18 DIAGNOSIS — E03.9 MYXEDEMA HEART DISEASE: ICD-10-CM

## 2023-09-18 LAB
25(OH)D3 SERPL-MCNC: 55.1 NG/ML (ref 30–100)
ALBUMIN SERPL-MCNC: 4.4 G/DL (ref 3.5–5.2)
ALBUMIN/GLOB SERPL: 1.8 G/DL
ALP SERPL-CCNC: 48 U/L (ref 39–117)
ALT SERPL W P-5'-P-CCNC: 11 U/L (ref 1–33)
ANION GAP SERPL CALCULATED.3IONS-SCNC: 10 MMOL/L (ref 5–15)
AST SERPL-CCNC: 20 U/L (ref 1–32)
BASOPHILS # BLD AUTO: 0.09 10*3/MM3 (ref 0–0.2)
BASOPHILS NFR BLD AUTO: 1.1 % (ref 0–1.5)
BILIRUB SERPL-MCNC: 0.3 MG/DL (ref 0–1.2)
BUN SERPL-MCNC: 19 MG/DL (ref 8–23)
BUN/CREAT SERPL: 17.3 (ref 7–25)
CALCIUM SPEC-SCNC: 9.3 MG/DL (ref 8.6–10.5)
CHLORIDE SERPL-SCNC: 105 MMOL/L (ref 98–107)
CHOLEST SERPL-MCNC: 187 MG/DL (ref 0–200)
CO2 SERPL-SCNC: 27 MMOL/L (ref 22–29)
CREAT SERPL-MCNC: 1.1 MG/DL (ref 0.57–1)
DEPRECATED RDW RBC AUTO: 45.9 FL (ref 37–54)
EGFRCR SERPLBLD CKD-EPI 2021: 49 ML/MIN/1.73
EOSINOPHIL # BLD AUTO: 0.18 10*3/MM3 (ref 0–0.4)
EOSINOPHIL NFR BLD AUTO: 2.1 % (ref 0.3–6.2)
ERYTHROCYTE [DISTWIDTH] IN BLOOD BY AUTOMATED COUNT: 13.5 % (ref 12.3–15.4)
GLOBULIN UR ELPH-MCNC: 2.5 GM/DL
GLUCOSE SERPL-MCNC: 100 MG/DL (ref 65–99)
HCT VFR BLD AUTO: 38.4 % (ref 34–46.6)
HDLC SERPL-MCNC: 71 MG/DL (ref 40–60)
HGB BLD-MCNC: 12.7 G/DL (ref 12–15.9)
IMM GRANULOCYTES # BLD AUTO: 0.02 10*3/MM3 (ref 0–0.05)
IMM GRANULOCYTES NFR BLD AUTO: 0.2 % (ref 0–0.5)
LDLC SERPL CALC-MCNC: 99 MG/DL (ref 0–100)
LDLC/HDLC SERPL: 1.36 {RATIO}
LYMPHOCYTES # BLD AUTO: 2.19 10*3/MM3 (ref 0.7–3.1)
LYMPHOCYTES NFR BLD AUTO: 25.8 % (ref 19.6–45.3)
MCH RBC QN AUTO: 30.8 PG (ref 26.6–33)
MCHC RBC AUTO-ENTMCNC: 33.1 G/DL (ref 31.5–35.7)
MCV RBC AUTO: 93.2 FL (ref 79–97)
MONOCYTES # BLD AUTO: 0.64 10*3/MM3 (ref 0.1–0.9)
MONOCYTES NFR BLD AUTO: 7.5 % (ref 5–12)
NEUTROPHILS NFR BLD AUTO: 5.36 10*3/MM3 (ref 1.7–7)
NEUTROPHILS NFR BLD AUTO: 63.3 % (ref 42.7–76)
NRBC BLD AUTO-RTO: 0 /100 WBC (ref 0–0.2)
PLATELET # BLD AUTO: 212 10*3/MM3 (ref 140–450)
PMV BLD AUTO: 12.3 FL (ref 6–12)
POTASSIUM SERPL-SCNC: 4.5 MMOL/L (ref 3.5–5.2)
PROT SERPL-MCNC: 6.9 G/DL (ref 6–8.5)
RBC # BLD AUTO: 4.12 10*6/MM3 (ref 3.77–5.28)
SODIUM SERPL-SCNC: 142 MMOL/L (ref 136–145)
TRIGL SERPL-MCNC: 98 MG/DL (ref 0–150)
TSH SERPL DL<=0.05 MIU/L-ACNC: 2.99 UIU/ML (ref 0.27–4.2)
VLDLC SERPL-MCNC: 17 MG/DL (ref 5–40)
WBC NRBC COR # BLD: 8.48 10*3/MM3 (ref 3.4–10.8)

## 2023-09-18 PROCEDURE — 82306 VITAMIN D 25 HYDROXY: CPT

## 2023-09-18 PROCEDURE — 85025 COMPLETE CBC W/AUTO DIFF WBC: CPT

## 2023-09-18 PROCEDURE — 80061 LIPID PANEL: CPT

## 2023-09-18 PROCEDURE — 36415 COLL VENOUS BLD VENIPUNCTURE: CPT

## 2023-09-18 PROCEDURE — 80053 COMPREHEN METABOLIC PANEL: CPT

## 2023-09-18 PROCEDURE — 84443 ASSAY THYROID STIM HORMONE: CPT

## 2023-11-13 ENCOUNTER — OFFICE VISIT (OUTPATIENT)
Dept: CARDIOLOGY | Facility: CLINIC | Age: 88
End: 2023-11-13
Payer: MEDICARE

## 2023-11-13 VITALS
SYSTOLIC BLOOD PRESSURE: 111 MMHG | WEIGHT: 114 LBS | HEART RATE: 67 BPM | OXYGEN SATURATION: 97 % | HEIGHT: 59 IN | DIASTOLIC BLOOD PRESSURE: 64 MMHG | BODY MASS INDEX: 22.98 KG/M2

## 2023-11-13 DIAGNOSIS — I48.0 PAF (PAROXYSMAL ATRIAL FIBRILLATION): ICD-10-CM

## 2023-11-13 DIAGNOSIS — I10 ESSENTIAL HYPERTENSION, BENIGN: ICD-10-CM

## 2023-11-13 DIAGNOSIS — E03.9 ACQUIRED HYPOTHYROIDISM: ICD-10-CM

## 2023-11-13 DIAGNOSIS — Z98.61 STATUS POST PERCUTANEOUS TRANSLUMINAL CORONARY ANGIOPLASTY: ICD-10-CM

## 2023-11-13 DIAGNOSIS — R06.02 SHORTNESS OF BREATH: ICD-10-CM

## 2023-11-13 DIAGNOSIS — R42 DIZZINESS: Primary | ICD-10-CM

## 2023-11-13 DIAGNOSIS — I25.10 CORONARY ARTERY DISEASE, NON-OCCLUSIVE: ICD-10-CM

## 2023-11-13 DIAGNOSIS — E78.2 MIXED HYPERLIPIDEMIA: ICD-10-CM

## 2023-11-13 DIAGNOSIS — Z95.820 STATUS POST ANGIOPLASTY WITH STENT: ICD-10-CM

## 2023-11-13 DIAGNOSIS — I10 ESSENTIAL HYPERTENSION: ICD-10-CM

## 2023-11-13 DIAGNOSIS — R07.89 CHEST DISCOMFORT: ICD-10-CM

## 2023-11-13 PROCEDURE — 1159F MED LIST DOCD IN RCRD: CPT | Performed by: INTERNAL MEDICINE

## 2023-11-13 PROCEDURE — 93000 ELECTROCARDIOGRAM COMPLETE: CPT | Performed by: INTERNAL MEDICINE

## 2023-11-13 PROCEDURE — 1160F RVW MEDS BY RX/DR IN RCRD: CPT | Performed by: INTERNAL MEDICINE

## 2023-11-13 PROCEDURE — 99214 OFFICE O/P EST MOD 30 MIN: CPT | Performed by: INTERNAL MEDICINE

## 2023-11-13 NOTE — PROGRESS NOTES
Encounter Date:11/13/2023  Last seen 4/25/2023      Patient ID: Sol Milton is a 88 y.o. female.    Chief Complaint:  Status post stent  Hypertension  Hypothyroidism        History of Present Illness  Recently on Friday she was in physical therapy and had dizziness.  Patient subsequently had another episode on Saturday.  Patient was seen in urgent care center and was started on meclizine.  Patient did not have further episodes.    Since I have last seen, the patient has been without any chest discomfort ,shortness of breath, palpitations, or syncope.  Denies having any headache ,abdominal pain ,nausea, vomiting , diarrhea constipation, loss of weight or loss of appetite.  Denies having any excessive bruising ,hematuria or blood in the stool.    Review of all systems negative except as indicated.    Reviewed ROS..  Assessment and Plan         ////////////////////////  History  -------------    status post stent to RCA for totally occluded vessel level in 10/2009.       History of proximal inferior wall hypokinesis with ejection fraction of 55-60%.  60% mid LAD distal to diagonal branch 50-60% mid circumflex 60% ostial marginal and 60% distal circumflex coronary artery disease.     Echocardiogram 8/30/2022-mild mitral regurgitation left atrial enlargement and normal left ventricular function.  Stress Cardiolite test-8/30/2022-normal     Echocardiogram-normal 9/22/2021.  Stress Cardiolite test-normal 9/22/2021      stress Cardiolite test 04/24/2018 showed mild apical infarction without ischemia     -hypothyroidism hypertension GERD and asthma     -Atrial fibrillation-new onset at last visit.  Patient is in sinus rhythm today 9/22/2021     -allergy to penicillin     -family history of coronary artery disease.  -----------------    Plan  ------------  Recently on Friday she was in physical therapy and had dizziness.  Patient subsequently had another episode on Saturday.  Patient was seen in urgent care center and was  started on meclizine.  Patient did not have further episodes.  Possible vertigo.  Rule out dysrhythmia  Event monitor.  Patient is concerned about carotid disease.  Patient has follow-up with primary care Dr. Cox.    Status post stent  Patient is not having any angina pectoris or congestive heart failure.  Showed sinus rhythm without ischemic changes-11/13/2023.      Occasional paroxysmal atrial fibrillation with controlled ventricular response  Patient is maintaining sinus rhythm.     Chronic coronary artery disease-stable  Patient was taken off Plavix and changed to baby aspirin by Dr. Cox primary care physician.     Hypertension-111/64  continue atenolol amlodipine.    Hypothyroidism-continue levothyroxine     Medications were reviewed and updated.     Follow-up in the office in 4 weeks with EKG.  Patient to have event monitor.    Further plan will depend on patient's progress.    Reviewed and updated-11/13/2023  ///////////        Diagnosis Plan   1. Status post angioplasty with stent        2. Essential hypertension        3. Status post percutaneous transluminal coronary angioplasty        4. Shortness of breath        5. Essential hypertension, benign        6. Chest discomfort        7. Coronary artery disease, non-occlusive        8. Mixed hyperlipidemia        9. PAF (paroxysmal atrial fibrillation)        10. Acquired hypothyroidism        LAB RESULTS (LAST 7 DAYS)    CBC        BMP        CMP         BNP        TROPONIN        CoAg        Creatinine Clearance  CrCl cannot be calculated (Patient's most recent lab result is older than the maximum 30 days allowed.).    ABG        Radiology  No radiology results for the last day                The following portions of the patient's history were reviewed and updated as appropriate: allergies, current medications, past family history, past medical history, past social history, past surgical history, and problem list.    Review of Systems    Constitutional: Negative for malaise/fatigue.   Cardiovascular:  Negative for chest pain, leg swelling, palpitations and syncope.   Respiratory:  Negative for shortness of breath.    Skin:  Negative for rash.   Gastrointestinal:  Negative for nausea and vomiting.   Neurological:  Negative for dizziness, light-headedness and numbness.   All other systems reviewed and are negative.        Current Outpatient Medications:   •  amLODIPine (NORVASC) 10 MG tablet, , Disp: , Rfl:   •  aspirin 81 MG chewable tablet, Chew 1 tablet Daily., Disp: , Rfl:   •  atenolol (TENORMIN) 25 MG tablet, , Disp: , Rfl:   •  Cholecalciferol (VITAMIN D3) 5000 units capsule capsule, VITAMIN D3 5000 UNIT CAPS, Disp: , Rfl:   •  clobetasol (TEMOVATE) 0.05 % external solution, APPLY TO SCALP 2 TO 3 TIMES PER WEEK AS NEEDED FOR ITCHING AND FLAKING, Disp: , Rfl:   •  clopidogrel (PLAVIX) 75 MG tablet, , Disp: , Rfl:   •  escitalopram (LEXAPRO) 10 MG tablet, Take 1 tablet by mouth Daily., Disp: , Rfl:   •  ezetimibe (ZETIA) 10 MG tablet, , Disp: , Rfl:   •  fluocinonide (LIDEX) 0.05 % cream, APPLY TO AFFECTED AREAS ON THE TORSO AND EXTREMITES 3 TO 4 TIMES A WEEK AS NEEDED, Disp: , Rfl:   •  hydrOXYzine (ATARAX) 10 MG tablet, TAKE 2 TABLETS BY MOUTH ONCE DAILY AT BEDTIME AS NEEDED FOR SLEEP. (PLEASE START WITH ONE TABLET AND INCREASE TO 2 TABLETS IF NEEDED), Disp: , Rfl:   •  levothyroxine (SYNTHROID, LEVOTHROID) 88 MCG tablet, SYNTHROID 88 MCG TABS, Disp: , Rfl:   •  Loratadine 10 MG capsule, CLARITIN 10 MG CAPS, Disp: , Rfl:   •  meclizine (ANTIVERT) 25 MG tablet, Take 1 tablet by mouth 3 (Three) Times a Day As Needed for Dizziness., Disp: 20 tablet, Rfl: 0  •  Multiple Vitamins-Minerals (MULTI VITAMIN/MINERALS) tablet, MULTI VITAMIN/MINERALS TABS, Disp: , Rfl:   •  ondansetron (ZOFRAN) 8 MG tablet, TAKE 1/2 TABLET BY MOUTH EVERY 4 HOURS FOR 10 DAYS AS NEEDED FOR NAUSEA, Disp: , Rfl:   •  raloxifene (EVISTA) 60 MG tablet, Take 1 tablet by mouth  "Daily., Disp: , Rfl:     Allergies   Allergen Reactions   • Adhesive Tape Itching     While wearing holter monitor itching to site where adhesive is  to skin   • Penicillin G Rash   • Prednisone Nausea And Vomiting       History reviewed. No pertinent family history.    Past Surgical History:   Procedure Laterality Date   • CARDIAC SURGERY     • EYE SURGERY         Past Medical History:   Diagnosis Date   • Disease of thyroid gland    • Hyperlipidemia    • Hypertension        History reviewed. No pertinent family history.    Social History     Socioeconomic History   • Marital status:    Tobacco Use   • Smoking status: Never     Passive exposure: Never   • Smokeless tobacco: Never   Vaping Use   • Vaping Use: Never used   Substance and Sexual Activity   • Alcohol use: No   • Drug use: Defer   • Sexual activity: Defer           ECG 12 Lead    Date/Time: 11/13/2023 10:38 AM  Performed by: Valdo Atkins MD    Authorized by: Valdo Atkins MD  Comparison: compared with previous ECG   Similar to previous ECG  Comparison to previous ECG: Normal sinus rhythm nonspecific ST-T wave changes 67/min normal axis normal intervals no ectopy no significant change from previous EKG.        Objective:       Physical Exam    /64 (BP Location: Left arm, Patient Position: Sitting, Cuff Size: Adult)   Pulse 67   Ht 149.9 cm (59\")   Wt 51.7 kg (114 lb)   SpO2 97% Comment: ra  BMI 23.03 kg/m²   The patient is alert, oriented and in no distress.    Vital signs as noted above.    Head and neck revealed no carotid bruits or jugular venous distension.  No thyromegaly or lymphadenopathy is present.    Lungs clear.  No wheezing.  Breath sounds are normal bilaterally.    Heart normal first and second heart sounds.  No murmur..  No pericardial rub is present.  No gallop is present.    Abdomen soft and nontender.  No organomegaly is present.    Extremities revealed good peripheral pulses without any pedal " edema.    Skin warm and dry.    Musculoskeletal system is grossly normal.    CNS grossly normal.    Reviewed and updated.

## 2023-11-17 NOTE — PROGRESS NOTES
Encounter Date:12/11/2023  Last seen 11/13/2023      Patient ID: Sol Milton is a 88 y.o. female.      Chief Complaint:  Status post stent  Hypertension  Hypothyroidism        History of Present Illness  Since I have last seen, the patient has been without any chest discomfort ,shortness of breath, palpitations, dizziness or syncope.  Denies having any headache ,abdominal pain ,nausea, vomiting , diarrhea constipation, loss of weight or loss of appetite.  Denies having any excessive bruising ,hematuria or blood in the stool.    Review of all systems negative except as indicated.    Reviewed ROS.  Reviewed ROS..  Assessment and Plan         ////////////////////////  History  -------------    status post stent to RCA for totally occluded vessel level in 10/2009.       History of proximal inferior wall hypokinesis with ejection fraction of 55-60%.  60% mid LAD distal to diagonal branch 50-60% mid circumflex 60% ostial marginal and 60% distal circumflex coronary artery disease.     Echocardiogram 8/30/2022-mild mitral regurgitation left atrial enlargement and normal left ventricular function.  Stress Cardiolite test-8/30/2022-normal     Echocardiogram-normal 9/22/2021.  Stress Cardiolite test-normal 9/22/2021      stress Cardiolite test 04/24/2018 showed mild apical infarction without ischemia    Holter monitor 12/5/2023  Basic rhythm is sinus with rates.  Between 53-83/min.  Occasional premature atrial contractions are seen.  Rare and short episodes of SVT were noted.  Occasional premature ventricular contractions are seen with rare couplets.  There were strips with probably baseline artifact and loose lead.  Unlikely asystole.  Patient was lying down.  Some chest pain but awake and able to provide symptoms.  No evidence for AV blocks was seen.  Suggest clinical correlation.  Patient was not having any clinical symptoms while she was wearing the monitor.    -hypothyroidism hypertension GERD and asthma     -Atrial  fibrillation-new onset at last visit.  Patient is in sinus rhythm today 9/22/2021     -allergy to penicillin     -family history of coronary artery disease.  -----------------    Plan  ------------  Dizziness-improved.  Patient had Holter monitor 12/5/2023.-As above.  Patient was educated regarding the results.  Patient was not having any symptoms of dizziness or any other symptoms while she was wearing the monitor.     Status post stent  Patient is not having any angina pectoris or congestive heart failure.  EKG 12/11/2023 revealed sinus rhythm without ischemic changes.    History ofOccasional paroxysmal atrial fibrillation with controlled ventricular response  Patient is maintaining sinus rhythm.     Chronic coronary artery disease-stable  Patient was taken off Plavix and changed to baby aspirin by Dr. Cox primary care physician.     Hypertension-138/71  Continue atenolol amlodipine.     Hypothyroidism-continue levothyroxine    Dyslipidemia-recent labs were reviewed and showed  Lipid panel.     Medications were reviewed and updated.     Follow-up in the office in 6 months.    Further plan will depend on patient's progress.     Reviewed and updated-12/11/2023.  ///////////         Diagnosis Plan   1. Status post angioplasty with stent        2. Essential hypertension        3. Status post percutaneous transluminal coronary angioplasty        4. Shortness of breath        5. Essential hypertension, benign        6. Chest discomfort        7. Coronary artery disease, non-occlusive        8. Dizziness        9. Acquired hypothyroidism        10. PAF (paroxysmal atrial fibrillation)        11. Mixed hyperlipidemia        LAB RESULTS (LAST 7 DAYS)    CBC        BMP        CMP         BNP        TROPONIN        CoAg        Creatinine Clearance  CrCl cannot be calculated (Patient's most recent lab result is older than the maximum 30 days allowed.).    ABG        Radiology  No radiology results for the last  day                The following portions of the patient's history were reviewed and updated as appropriate: allergies, current medications, past family history, past medical history, past social history, past surgical history, and problem list.    Review of Systems   Constitutional: Negative for malaise/fatigue.   Cardiovascular:  Negative for chest pain, leg swelling, palpitations and syncope.   Respiratory:  Negative for shortness of breath.    Skin:  Negative for rash.   Gastrointestinal:  Negative for nausea and vomiting.   Neurological:  Negative for dizziness, light-headedness and numbness.   All other systems reviewed and are negative.        Current Outpatient Medications:   •  amLODIPine (NORVASC) 10 MG tablet, , Disp: , Rfl:   •  aspirin 81 MG chewable tablet, Chew 1 tablet Daily., Disp: , Rfl:   •  atenolol (TENORMIN) 25 MG tablet, , Disp: , Rfl:   •  Cholecalciferol (VITAMIN D3) 5000 units capsule capsule, VITAMIN D3 5000 UNIT CAPS, Disp: , Rfl:   •  clobetasol (TEMOVATE) 0.05 % external solution, APPLY TO SCALP 2 TO 3 TIMES PER WEEK AS NEEDED FOR ITCHING AND FLAKING, Disp: , Rfl:   •  clopidogrel (PLAVIX) 75 MG tablet, , Disp: , Rfl:   •  escitalopram (LEXAPRO) 10 MG tablet, Take 1 tablet by mouth Daily., Disp: , Rfl:   •  ezetimibe (ZETIA) 10 MG tablet, , Disp: , Rfl:   •  fluocinonide (LIDEX) 0.05 % cream, APPLY TO AFFECTED AREAS ON THE TORSO AND EXTREMITES 3 TO 4 TIMES A WEEK AS NEEDED, Disp: , Rfl:   •  hydrOXYzine (ATARAX) 10 MG tablet, TAKE 2 TABLETS BY MOUTH ONCE DAILY AT BEDTIME AS NEEDED FOR SLEEP. (PLEASE START WITH ONE TABLET AND INCREASE TO 2 TABLETS IF NEEDED), Disp: , Rfl:   •  levothyroxine (SYNTHROID, LEVOTHROID) 88 MCG tablet, SYNTHROID 88 MCG TABS, Disp: , Rfl:   •  Loratadine 10 MG capsule, CLARITIN 10 MG CAPS, Disp: , Rfl:   •  meclizine (ANTIVERT) 25 MG tablet, Take 1 tablet by mouth 3 (Three) Times a Day As Needed for Dizziness., Disp: 20 tablet, Rfl: 0  •  Multiple  "Vitamins-Minerals (MULTI VITAMIN/MINERALS) tablet, MULTI VITAMIN/MINERALS TABS, Disp: , Rfl:   •  ondansetron (ZOFRAN) 8 MG tablet, TAKE 1/2 TABLET BY MOUTH EVERY 4 HOURS FOR 10 DAYS AS NEEDED FOR NAUSEA, Disp: , Rfl:   •  raloxifene (EVISTA) 60 MG tablet, Take 1 tablet by mouth Daily., Disp: , Rfl:     Allergies   Allergen Reactions   • Adhesive Tape Itching     While wearing holter monitor itching to site where adhesive is  to skin   • Penicillin G Rash   • Prednisone Nausea And Vomiting       History reviewed. No pertinent family history.    Past Surgical History:   Procedure Laterality Date   • CARDIAC SURGERY     • EYE SURGERY         Past Medical History:   Diagnosis Date   • Disease of thyroid gland    • Hyperlipidemia    • Hypertension        History reviewed. No pertinent family history.    Social History     Socioeconomic History   • Marital status:    Tobacco Use   • Smoking status: Never     Passive exposure: Never   • Smokeless tobacco: Never   Vaping Use   • Vaping Use: Never used   Substance and Sexual Activity   • Alcohol use: No   • Drug use: Defer   • Sexual activity: Defer           ECG 12 Lead    Date/Time: 12/11/2023 3:00 PM  Performed by: Valdo Atkins MD    Authorized by: Valdo Atkins MD  Comparison: compared with previous ECG   Similar to previous ECG  Comparison to previous ECG: Normal sinus rhythm 65/min normal ECG normal axis normal intervals no ectopy no significant change from previous EKG.        Objective:       Physical Exam    /71 (BP Location: Left arm, Patient Position: Sitting, Cuff Size: Adult)   Pulse 65   Ht 149.9 cm (59\")   Wt 53.1 kg (117 lb)   SpO2 98% Comment: RA  BMI 23.63 kg/m²   The patient is alert, oriented and in no distress.    Vital signs as noted above.    Head and neck revealed no carotid bruits or jugular venous distension.  No thyromegaly or lymphadenopathy is present.    Lungs clear.  No wheezing.  Breath sounds are normal " bilaterally.    Heart normal first and second heart sounds.  No murmur..  No pericardial rub is present.  No gallop is present.    Abdomen soft and nontender.  No organomegaly is present.    Extremities revealed good peripheral pulses without any pedal edema.    Skin warm and dry.    Musculoskeletal system is grossly normal.    CNS grossly normal.    Reviewed and updated.

## 2023-12-11 ENCOUNTER — OFFICE VISIT (OUTPATIENT)
Dept: CARDIOLOGY | Facility: CLINIC | Age: 88
End: 2023-12-11
Payer: MEDICARE

## 2023-12-11 VITALS
DIASTOLIC BLOOD PRESSURE: 71 MMHG | OXYGEN SATURATION: 98 % | HEART RATE: 65 BPM | BODY MASS INDEX: 23.59 KG/M2 | HEIGHT: 59 IN | WEIGHT: 117 LBS | SYSTOLIC BLOOD PRESSURE: 138 MMHG

## 2023-12-11 DIAGNOSIS — R06.02 SHORTNESS OF BREATH: ICD-10-CM

## 2023-12-11 DIAGNOSIS — Z95.820 STATUS POST ANGIOPLASTY WITH STENT: Primary | ICD-10-CM

## 2023-12-11 DIAGNOSIS — R42 DIZZINESS: ICD-10-CM

## 2023-12-11 DIAGNOSIS — I48.0 PAF (PAROXYSMAL ATRIAL FIBRILLATION): ICD-10-CM

## 2023-12-11 DIAGNOSIS — E03.9 ACQUIRED HYPOTHYROIDISM: ICD-10-CM

## 2023-12-11 DIAGNOSIS — I25.10 CORONARY ARTERY DISEASE, NON-OCCLUSIVE: ICD-10-CM

## 2023-12-11 DIAGNOSIS — R07.89 CHEST DISCOMFORT: ICD-10-CM

## 2023-12-11 DIAGNOSIS — Z98.61 STATUS POST PERCUTANEOUS TRANSLUMINAL CORONARY ANGIOPLASTY: ICD-10-CM

## 2023-12-11 DIAGNOSIS — I10 ESSENTIAL HYPERTENSION, BENIGN: ICD-10-CM

## 2023-12-11 DIAGNOSIS — E78.2 MIXED HYPERLIPIDEMIA: ICD-10-CM

## 2023-12-11 DIAGNOSIS — I10 ESSENTIAL HYPERTENSION: ICD-10-CM

## 2023-12-11 PROCEDURE — 93000 ELECTROCARDIOGRAM COMPLETE: CPT | Performed by: INTERNAL MEDICINE

## 2023-12-11 PROCEDURE — 1159F MED LIST DOCD IN RCRD: CPT | Performed by: INTERNAL MEDICINE

## 2023-12-11 PROCEDURE — 1160F RVW MEDS BY RX/DR IN RCRD: CPT | Performed by: INTERNAL MEDICINE

## 2023-12-11 PROCEDURE — 99214 OFFICE O/P EST MOD 30 MIN: CPT | Performed by: INTERNAL MEDICINE

## 2024-03-06 ENCOUNTER — TELEPHONE (OUTPATIENT)
Dept: URGENT CARE | Facility: CLINIC | Age: 89
End: 2024-03-06
Payer: MEDICARE

## 2024-03-29 ENCOUNTER — APPOINTMENT (OUTPATIENT)
Dept: GENERAL RADIOLOGY | Facility: HOSPITAL | Age: 89
DRG: 538 | End: 2024-03-29
Payer: MEDICARE

## 2024-03-29 ENCOUNTER — TRANSCRIBE ORDERS (OUTPATIENT)
Dept: CARDIOLOGY | Facility: HOSPITAL | Age: 89
End: 2024-03-29
Payer: MEDICARE

## 2024-03-29 ENCOUNTER — HOSPITAL ENCOUNTER (EMERGENCY)
Facility: HOSPITAL | Age: 89
Discharge: HOME | DRG: 538 | End: 2024-03-29
Attending: EMERGENCY MEDICINE
Payer: MEDICARE

## 2024-03-29 ENCOUNTER — HOSPITAL ENCOUNTER (OUTPATIENT)
Dept: CARDIOLOGY | Facility: HOSPITAL | Age: 89
Discharge: HOME | End: 2024-03-29
Payer: MEDICARE

## 2024-03-29 ENCOUNTER — LAB (OUTPATIENT)
Dept: LAB | Facility: HOSPITAL | Age: 89
End: 2024-03-29
Payer: MEDICARE

## 2024-03-29 ENCOUNTER — TRANSCRIBE ORDERS (OUTPATIENT)
Dept: LAB | Facility: HOSPITAL | Age: 89
End: 2024-03-29
Payer: MEDICARE

## 2024-03-29 VITALS
WEIGHT: 118 LBS | DIASTOLIC BLOOD PRESSURE: 69 MMHG | BODY MASS INDEX: 23.79 KG/M2 | OXYGEN SATURATION: 99 % | RESPIRATION RATE: 17 BRPM | HEART RATE: 71 BPM | SYSTOLIC BLOOD PRESSURE: 135 MMHG | TEMPERATURE: 97.7 F | HEIGHT: 59 IN

## 2024-03-29 DIAGNOSIS — R79.1 ABNORMAL COAGULATION PROFILE: ICD-10-CM

## 2024-03-29 DIAGNOSIS — R79.89 OTHER SPECIFIED ABNORMAL FINDINGS OF BLOOD CHEMISTRY: ICD-10-CM

## 2024-03-29 DIAGNOSIS — S76.191A OTHER SPECIFIED INJURY OF RIGHT QUADRICEPS MUSCLE, FASCIA AND TENDON, INITIAL ENCOUNTER: ICD-10-CM

## 2024-03-29 DIAGNOSIS — S76.191A OTHER SPECIFIED INJURY OF RIGHT QUADRICEPS MUSCLE, FASCIA AND TENDON, INITIAL ENCOUNTER: Primary | ICD-10-CM

## 2024-03-29 DIAGNOSIS — S76.111A RUPTURE OF RIGHT QUADRICEPS TENDON, INITIAL ENCOUNTER: Primary | ICD-10-CM

## 2024-03-29 DIAGNOSIS — Z01.811 PRE-OP CHEST EXAM: Primary | ICD-10-CM

## 2024-03-29 LAB
ALBUMIN SERPL-MCNC: 4.1 G/DL (ref 3.5–5.2)
ALBUMIN/GLOB SERPL: 1.5 G/DL
ALP SERPL-CCNC: 48 U/L (ref 39–117)
ALT SERPL W P-5'-P-CCNC: 10 U/L (ref 1–33)
ANION GAP SERPL CALCULATED.3IONS-SCNC: 8.7 MMOL/L (ref 5–15)
AST SERPL-CCNC: 16 U/L (ref 1–32)
BACTERIA UR QL AUTO: ABNORMAL /HPF
BASOPHILS # BLD AUTO: 0.07 10*3/MM3 (ref 0–0.2)
BASOPHILS NFR BLD AUTO: 0.7 % (ref 0–1.5)
BILIRUB SERPL-MCNC: 0.5 MG/DL (ref 0–1.2)
BILIRUB UR QL STRIP: NEGATIVE
BUN SERPL-MCNC: 27 MG/DL (ref 8–23)
BUN/CREAT SERPL: 28.4 (ref 7–25)
CALCIUM SPEC-SCNC: 9.1 MG/DL (ref 8.6–10.5)
CHLORIDE SERPL-SCNC: 109 MMOL/L (ref 98–107)
CLARITY UR: ABNORMAL
CO2 SERPL-SCNC: 25.3 MMOL/L (ref 22–29)
COLOR UR: YELLOW
CREAT SERPL-MCNC: 0.95 MG/DL (ref 0.57–1)
DEPRECATED RDW RBC AUTO: 46.9 FL (ref 37–54)
EGFRCR SERPLBLD CKD-EPI 2021: 58.1 ML/MIN/1.73
EOSINOPHIL # BLD AUTO: 0.18 10*3/MM3 (ref 0–0.4)
EOSINOPHIL NFR BLD AUTO: 1.9 % (ref 0.3–6.2)
ERYTHROCYTE [DISTWIDTH] IN BLOOD BY AUTOMATED COUNT: 13.8 % (ref 12.3–15.4)
GLOBULIN UR ELPH-MCNC: 2.7 GM/DL
GLUCOSE SERPL-MCNC: 102 MG/DL (ref 65–99)
GLUCOSE UR STRIP-MCNC: NEGATIVE MG/DL
HBA1C MFR BLD: 5.9 % (ref 4.8–5.6)
HCT VFR BLD AUTO: 34.6 % (ref 34–46.6)
HGB BLD-MCNC: 11.3 G/DL (ref 12–15.9)
HGB UR QL STRIP.AUTO: ABNORMAL
HOLD SPECIMEN: NORMAL
HOLD SPECIMEN: NORMAL
HYALINE CASTS UR QL AUTO: ABNORMAL /LPF
IMM GRANULOCYTES # BLD AUTO: 0.03 10*3/MM3 (ref 0–0.05)
IMM GRANULOCYTES NFR BLD AUTO: 0.3 % (ref 0–0.5)
INR PPP: 1.11 (ref 0.9–1.1)
KETONES UR QL STRIP: NEGATIVE
LEUKOCYTE ESTERASE UR QL STRIP.AUTO: ABNORMAL
LYMPHOCYTES # BLD AUTO: 2.01 10*3/MM3 (ref 0.7–3.1)
LYMPHOCYTES NFR BLD AUTO: 21.4 % (ref 19.6–45.3)
MCH RBC QN AUTO: 30.3 PG (ref 26.6–33)
MCHC RBC AUTO-ENTMCNC: 32.7 G/DL (ref 31.5–35.7)
MCV RBC AUTO: 92.8 FL (ref 79–97)
MONOCYTES # BLD AUTO: 0.77 10*3/MM3 (ref 0.1–0.9)
MONOCYTES NFR BLD AUTO: 8.2 % (ref 5–12)
NEUTROPHILS NFR BLD AUTO: 6.32 10*3/MM3 (ref 1.7–7)
NEUTROPHILS NFR BLD AUTO: 67.5 % (ref 42.7–76)
NITRITE UR QL STRIP: NEGATIVE
NRBC BLD AUTO-RTO: 0 /100 WBC (ref 0–0.2)
PH UR STRIP.AUTO: 6.5 [PH] (ref 5–8)
PLATELET # BLD AUTO: 225 10*3/MM3 (ref 140–450)
PMV BLD AUTO: 11.6 FL (ref 6–12)
POTASSIUM SERPL-SCNC: 4.2 MMOL/L (ref 3.5–5.2)
PROT SERPL-MCNC: 6.8 G/DL (ref 6–8.5)
PROT UR QL STRIP: ABNORMAL
PROTHROMBIN TIME: 14.6 SECONDS (ref 11.7–14.2)
QT INTERVAL: 406 MS
QTC INTERVAL: 419 MS
RBC # BLD AUTO: 3.73 10*6/MM3 (ref 3.77–5.28)
RBC # UR STRIP: ABNORMAL /HPF
REF LAB TEST METHOD: ABNORMAL
SODIUM SERPL-SCNC: 143 MMOL/L (ref 136–145)
SP GR UR STRIP: 1.02 (ref 1–1.03)
SQUAMOUS #/AREA URNS HPF: ABNORMAL /HPF
UROBILINOGEN UR QL STRIP: ABNORMAL
WBC # UR STRIP: ABNORMAL /HPF
WBC NRBC COR # BLD AUTO: 9.38 10*3/MM3 (ref 3.4–10.8)
WHOLE BLOOD HOLD COAG: NORMAL
WHOLE BLOOD HOLD SPECIMEN: NORMAL

## 2024-03-29 PROCEDURE — 93005 ELECTROCARDIOGRAM TRACING: CPT

## 2024-03-29 PROCEDURE — 36415 COLL VENOUS BLD VENIPUNCTURE: CPT

## 2024-03-29 PROCEDURE — 83036 HEMOGLOBIN GLYCOSYLATED A1C: CPT

## 2024-03-29 PROCEDURE — 73560 X-RAY EXAM OF KNEE 1 OR 2: CPT

## 2024-03-29 PROCEDURE — 73562 X-RAY EXAM OF KNEE 3: CPT

## 2024-03-29 PROCEDURE — 81001 URINALYSIS AUTO W/SCOPE: CPT

## 2024-03-29 PROCEDURE — 99283 EMERGENCY DEPT VISIT LOW MDM: CPT

## 2024-03-29 PROCEDURE — 85025 COMPLETE CBC W/AUTO DIFF WBC: CPT

## 2024-03-29 PROCEDURE — 25010000002 HYDROMORPHONE 1 MG/ML SOLUTION: Performed by: EMERGENCY MEDICINE

## 2024-03-29 PROCEDURE — 80053 COMPREHEN METABOLIC PANEL: CPT

## 2024-03-29 PROCEDURE — 85610 PROTHROMBIN TIME: CPT

## 2024-03-29 PROCEDURE — 96374 THER/PROPH/DIAG INJ IV PUSH: CPT

## 2024-03-29 PROCEDURE — 73502 X-RAY EXAM HIP UNI 2-3 VIEWS: CPT

## 2024-03-29 RX ORDER — SODIUM CHLORIDE 0.9 % (FLUSH) 0.9 %
10 SYRINGE (ML) INJECTION AS NEEDED
Status: DISCONTINUED | OUTPATIENT
Start: 2024-03-29 | End: 2024-03-29 | Stop reason: HOSPADM

## 2024-03-29 RX ADMIN — HYDROMORPHONE HYDROCHLORIDE 0.25 MG: 1 INJECTION, SOLUTION INTRAMUSCULAR; INTRAVENOUS; SUBCUTANEOUS at 01:36

## 2024-03-29 NOTE — ED PROVIDER NOTES
Subjective   History of Present Illness  89-year-old female presents after slipped and fell getting ready for bed tonight.  She complains of not being able to straighten her knee.  She had no syncope.  She states she had some pain in her hip but mostly knee.  She has no complaints of other injury.  Review of Systems    Past Medical History:   Diagnosis Date    Disease of thyroid gland     Hyperlipidemia     Hypertension     Myocardial infarction 2009       Allergies   Allergen Reactions    Adhesive Tape Itching     While wearing holter monitor itching to site where adhesive is  to skin    Penicillin G Rash    Prednisone Nausea And Vomiting    Latex Rash       Past Surgical History:   Procedure Laterality Date    CARDIAC SURGERY      EYE SURGERY         No family history on file.    Social History     Socioeconomic History    Marital status:    Tobacco Use    Smoking status: Never     Passive exposure: Never    Smokeless tobacco: Never   Vaping Use    Vaping status: Never Used   Substance and Sexual Activity    Alcohol use: No    Drug use: Defer    Sexual activity: Defer     Prior to Admission medications    Medication Sig Start Date End Date Taking? Authorizing Provider   amLODIPine (NORVASC) 10 MG tablet  12/16/14   Emergency, Nurse Epic, RN   aspirin 81 MG chewable tablet Chew 1 tablet Daily.    ProviderPrasanth MD   atenolol (TENORMIN) 25 MG tablet  3/12/12   Emergency, Nurse Ilene, RN   Cholecalciferol (VITAMIN D3) 5000 units capsule capsule VITAMIN D3 5000 UNIT CAPS 3/10/16   Emergency, Nurse Ilene RN   clobetasol (TEMOVATE) 0.05 % external solution APPLY TO SCALP 2 TO 3 TIMES PER WEEK AS NEEDED FOR ITCHING AND FLAKING 1/4/23   ProviderPrasanth MD   ezetimibe (ZETIA) 10 MG tablet  4/24/18   Emergency, Nurse Odom RN   hydrOXYzine (ATARAX) 10 MG tablet TAKE 2 TABLETS BY MOUTH ONCE DAILY AT BEDTIME AS NEEDED FOR SLEEP. (PLEASE START WITH ONE TABLET AND INCREASE TO 2 TABLETS IF NEEDED)  8/17/22   Prasanth Hutchinson MD   levothyroxine (SYNTHROID, LEVOTHROID) 88 MCG tablet SYNTHROID 88 MCG TABS 12/16/14   Emergency, Nurse Ilene, RN   Loratadine 10 MG capsule CLARITIN 10 MG CAPS 12/16/14   Emergency, Nurse Epic, RN   mupirocin (BACTROBAN) 2 % ointment Apply 1 Application topically to the appropriate area as directed 2 (Two) Times a Day. 2/14/24   Ester Head APRN   Premarin 0.625 MG/GM vaginal cream Insert 0.5 g into the vagina 2 (Two) Times a Week. 2/14/24   Prasanth Hutchinson MD   raloxifene (EVISTA) 60 MG tablet Take 1 tablet by mouth Daily. 11/23/19   Prasanth Hutchinson MD           Objective   Physical Exam  89-year-old female awake alert.  Generally well-developed well-nourished for age.  No complaints of any injury outside of right leg.  She is holding right knee flexed.  She does have some bruising anterior knee.  She has increased pain if she tries to straighten leg.  She does not appear to have significant pain at her hip.  She is neurovascular intact distally  Procedures           ED Course      Results for orders placed or performed during the hospital encounter of 03/29/24   Green Top (Gel)   Result Value Ref Range    Extra Tube Hold for add-ons.    Lavender Top   Result Value Ref Range    Extra Tube hold for add-on    Gold Top - SST   Result Value Ref Range    Extra Tube Hold for add-ons.    Light Blue Top   Result Value Ref Range    Extra Tube Hold for add-ons.      XR Hip With or Without Pelvis 2 - 3 View Right    Result Date: 3/29/2024  Impression: No acute osseous abnormality of the pelvis or right hip. The right knee was also imaged but incorrectly ordered as a left knee. There is degenerative change with no fracture of the right knee. Electronically Signed: Patric Ramirez MD  3/29/2024 2:18 AM EDT  Workstation ID: [id number]   Medications   sodium chloride 0.9 % flush 10 mL (has no administration in time range)   HYDROmorphone (DILAUDID) injection 0.25 mg (0.25 mg  "Intravenous Given 3/29/24 0136)     /69   Pulse 71   Temp 97.7 °F (36.5 °C) (Oral)   Resp 17   Ht 149.9 cm (59\")   Wt 53.5 kg (118 lb)   SpO2 99%   BMI 23.83 kg/m²                                          Medical Decision Making  Amount and/or Complexity of Data Reviewed  Radiology: ordered.    Risk  Prescription drug management.    Patient had IV placed she was given small dose of hydromorphone for pain.  X-rays of right hip and knee were obtained My review and interpretation shows no fracture.  There is some degenerative change.  There is no subluxation or dislocation.  Repeat evaluation patient's leg is straightened.  She has noted to have some bruising anteriorly.  She has noted to have loss of quadriceps extensor mechanism.  She has not able to hold her leg straight at all.  Patient was placed in a knee immobilizer.  She was given walker to assist with ambulation.  She is to call Dr. Lucero's office this morning for follow-up.  Dr. Lucero was notified and will notify the office of pending follow-up    Final diagnoses:   Rupture of right quadriceps tendon, initial encounter       ED Disposition  ED Disposition       ED Disposition   Discharge    Condition   Stable    Comment   --               Leydi Cox MD  [address] [address]  [phone]               Medication List      No changes were made to your prescriptions during this visit.            Jose Hinkle MD  03/29/24 0349       Jose Hinkle MD  03/29/24 0651    "

## 2024-03-29 NOTE — DISCHARGE INSTRUCTIONS
Use knee immobilizer.  May use ice to the knee intermittently.  May use Tylenol, ibuprofen for pain.

## 2024-03-31 PROBLEM — S76.119A QUADRICEPS TENDON RUPTURE: Status: ACTIVE | Noted: 2024-03-31

## 2024-03-31 RX ORDER — NALOXONE HCL 0.4 MG/ML
0.4 VIAL (ML) INJECTION
Status: CANCELLED | OUTPATIENT
Start: 2024-03-31

## 2024-03-31 RX ORDER — ATENOLOL 25 MG/1
25 TABLET ORAL
Status: CANCELLED | OUTPATIENT
Start: 2024-03-31

## 2024-03-31 RX ORDER — SODIUM CHLORIDE 0.9 % (FLUSH) 0.9 %
1-10 SYRINGE (ML) INJECTION AS NEEDED
Status: CANCELLED | OUTPATIENT
Start: 2024-03-31

## 2024-03-31 RX ORDER — FAMOTIDINE 40 MG/1
40 TABLET, FILM COATED ORAL DAILY
Status: CANCELLED | OUTPATIENT
Start: 2024-03-31

## 2024-03-31 RX ORDER — BISACODYL 10 MG
10 SUPPOSITORY, RECTAL RECTAL DAILY PRN
Status: CANCELLED | OUTPATIENT
Start: 2024-03-31

## 2024-03-31 RX ORDER — AMOXICILLIN 250 MG
2 CAPSULE ORAL 2 TIMES DAILY
Status: CANCELLED | OUTPATIENT
Start: 2024-03-31

## 2024-03-31 RX ORDER — SODIUM CHLORIDE 0.9 % (FLUSH) 0.9 %
10 SYRINGE (ML) INJECTION EVERY 12 HOURS SCHEDULED
Status: CANCELLED | OUTPATIENT
Start: 2024-03-31

## 2024-03-31 RX ORDER — POLYETHYLENE GLYCOL 3350 17 G/17G
17 POWDER, FOR SOLUTION ORAL DAILY PRN
Status: CANCELLED | OUTPATIENT
Start: 2024-03-31

## 2024-03-31 RX ORDER — HYDROCODONE BITARTRATE AND ACETAMINOPHEN 5; 325 MG/1; MG/1
2 TABLET ORAL EVERY 4 HOURS PRN
Status: CANCELLED | OUTPATIENT
Start: 2024-03-31 | End: 2024-04-07

## 2024-03-31 RX ORDER — LEVOTHYROXINE SODIUM 88 UG/1
88 TABLET ORAL
Status: CANCELLED | OUTPATIENT
Start: 2024-03-31

## 2024-03-31 RX ORDER — HYDROXYZINE HYDROCHLORIDE 10 MG/1
10 TABLET, FILM COATED ORAL NIGHTLY PRN
Status: CANCELLED | OUTPATIENT
Start: 2024-03-31

## 2024-03-31 RX ORDER — HYDROCODONE BITARTRATE AND ACETAMINOPHEN 5; 325 MG/1; MG/1
1 TABLET ORAL EVERY 4 HOURS PRN
Status: CANCELLED | OUTPATIENT
Start: 2024-03-31 | End: 2024-04-07

## 2024-03-31 RX ORDER — ONDANSETRON 4 MG/1
4 TABLET, ORALLY DISINTEGRATING ORAL EVERY 6 HOURS PRN
Status: CANCELLED | OUTPATIENT
Start: 2024-03-31

## 2024-03-31 RX ORDER — SODIUM CHLORIDE 9 MG/ML
100 INJECTION, SOLUTION INTRAVENOUS CONTINUOUS
Status: CANCELLED | OUTPATIENT
Start: 2024-03-31

## 2024-03-31 RX ORDER — AMLODIPINE BESYLATE 10 MG/1
10 TABLET ORAL
Status: CANCELLED | OUTPATIENT
Start: 2024-03-31

## 2024-03-31 RX ORDER — BISACODYL 5 MG/1
5 TABLET, DELAYED RELEASE ORAL DAILY PRN
Status: CANCELLED | OUTPATIENT
Start: 2024-03-31

## 2024-03-31 RX ORDER — SODIUM CHLORIDE 9 MG/ML
40 INJECTION, SOLUTION INTRAVENOUS AS NEEDED
Status: CANCELLED | OUTPATIENT
Start: 2024-03-31

## 2024-04-01 ENCOUNTER — HOSPITAL ENCOUNTER (INPATIENT)
Facility: HOSPITAL | Age: 89
LOS: 1 days | Discharge: HOME | DRG: 538 | End: 2024-04-01
Attending: ORTHOPAEDIC SURGERY | Admitting: ORTHOPAEDIC SURGERY
Payer: MEDICARE

## 2024-04-01 ENCOUNTER — ANESTHESIA (OUTPATIENT)
Dept: PERIOP | Facility: HOSPITAL | Age: 89
DRG: 538 | End: 2024-04-01
Payer: MEDICARE

## 2024-04-01 ENCOUNTER — ANESTHESIA EVENT (OUTPATIENT)
Dept: PERIOP | Facility: HOSPITAL | Age: 89
DRG: 538 | End: 2024-04-01
Payer: MEDICARE

## 2024-04-01 VITALS
RESPIRATION RATE: 20 BRPM | DIASTOLIC BLOOD PRESSURE: 81 MMHG | HEART RATE: 65 BPM | SYSTOLIC BLOOD PRESSURE: 119 MMHG | TEMPERATURE: 98 F | OXYGEN SATURATION: 100 %

## 2024-04-01 PROCEDURE — G0463 HOSPITAL OUTPT CLINIC VISIT: HCPCS | Performed by: ORTHOPAEDIC SURGERY

## 2024-04-01 RX ORDER — LIDOCAINE HYDROCHLORIDE 10 MG/ML
0.5 INJECTION, SOLUTION INFILTRATION; PERINEURAL ONCE AS NEEDED
Status: DISCONTINUED | OUTPATIENT
Start: 2024-04-01 | End: 2024-04-01 | Stop reason: HOSPADM

## 2024-04-01 RX ORDER — SODIUM CHLORIDE 0.9 % (FLUSH) 0.9 %
3-10 SYRINGE (ML) INJECTION AS NEEDED
Status: DISCONTINUED | OUTPATIENT
Start: 2024-04-01 | End: 2024-04-01 | Stop reason: HOSPADM

## 2024-04-01 RX ORDER — SULFAMETHOXAZOLE AND TRIMETHOPRIM 800; 160 MG/1; MG/1
1 TABLET ORAL 2 TIMES DAILY
Qty: 6 TABLET | Refills: 0 | Status: SHIPPED | OUTPATIENT
Start: 2024-04-01 | End: 2024-04-04

## 2024-04-01 RX ORDER — FENTANYL CITRATE 50 UG/ML
50 INJECTION, SOLUTION INTRAMUSCULAR; INTRAVENOUS ONCE AS NEEDED
Status: DISCONTINUED | OUTPATIENT
Start: 2024-04-01 | End: 2024-04-01 | Stop reason: HOSPADM

## 2024-04-01 RX ORDER — SODIUM CHLORIDE 0.9 % (FLUSH) 0.9 %
3 SYRINGE (ML) INJECTION EVERY 12 HOURS SCHEDULED
Status: DISCONTINUED | OUTPATIENT
Start: 2024-04-01 | End: 2024-04-01 | Stop reason: HOSPADM

## 2024-04-01 RX ORDER — CLINDAMYCIN PHOSPHATE 900 MG/50ML
900 INJECTION, SOLUTION INTRAVENOUS ONCE
Status: DISCONTINUED | OUTPATIENT
Start: 2024-04-01 | End: 2024-04-01 | Stop reason: HOSPADM

## 2024-04-01 RX ORDER — FAMOTIDINE 10 MG/ML
20 INJECTION, SOLUTION INTRAVENOUS ONCE
Status: DISCONTINUED | OUTPATIENT
Start: 2024-04-01 | End: 2024-04-01 | Stop reason: HOSPADM

## 2024-04-01 RX ORDER — SODIUM CHLORIDE, SODIUM LACTATE, POTASSIUM CHLORIDE, CALCIUM CHLORIDE 600; 310; 30; 20 MG/100ML; MG/100ML; MG/100ML; MG/100ML
9 INJECTION, SOLUTION INTRAVENOUS CONTINUOUS
Status: DISCONTINUED | OUTPATIENT
Start: 2024-04-01 | End: 2024-04-01 | Stop reason: HOSPADM

## 2024-04-01 RX ORDER — MIDAZOLAM HYDROCHLORIDE 1 MG/ML
0.5 INJECTION INTRAMUSCULAR; INTRAVENOUS
Status: DISCONTINUED | OUTPATIENT
Start: 2024-04-01 | End: 2024-04-01 | Stop reason: HOSPADM

## 2024-04-01 NOTE — ANESTHESIA PREPROCEDURE EVALUATION
Anesthesia Evaluation     Patient summary reviewed and Nursing notes reviewed   NPO Solid Status: > 2 hours  NPO Liquid Status: > 2 hours           Airway   Mallampati: II  TM distance: >3 FB  Neck ROM: full  No difficulty expected  Dental    (+) partials    Comment: Lower partial    Pulmonary    (+) asthma,  Cardiovascular     ECG reviewed  Rhythm: regular  Rate: normal    (+) hypertension 2 medications or greater, past MI  >12 months, murmur, hyperlipidemia    ROS comment: CAD with hx MI and stents in 2009/normal stress test 8/22/EF 60%, mild MR by ECHO 8/22  PE comment: Systolic murmur    Neuro/Psych  GI/Hepatic/Renal/Endo    (+) GERD, thyroid problem hypothyroidism    Musculoskeletal     Abdominal    Substance History      OB/GYN          Other   blood dyscrasia anemia,       Other Comment: Hgb 11.3                Anesthesia Plan    ASA 3     general     (Patient ate breakfast  at 0830, surgery cannot proceed before 1630)  intravenous induction     Anesthetic plan, risks, benefits, and alternatives have been provided, discussed and informed consent has been obtained with: patient.      CODE STATUS:

## 2024-04-05 ENCOUNTER — ANESTHESIA EVENT (OUTPATIENT)
Dept: PERIOP | Facility: HOSPITAL | Age: 89
End: 2024-04-05
Payer: MEDICARE

## 2024-04-05 NOTE — ANESTHESIA PREPROCEDURE EVALUATION
Anesthesia Evaluation     Patient summary reviewed and Nursing notes reviewed   no history of anesthetic complications:   NPO Solid Status: > 8 hours  NPO Liquid Status: > 2 hours           Airway   Dental      Pulmonary    (+) asthma,  Cardiovascular     ECG reviewed  PT is on anticoagulation therapy  Patient on routine beta blocker    (+) hypertension, valvular problems/murmurs MR, past MI , hyperlipidemia      Neuro/Psych  GI/Hepatic/Renal/Endo    (+) GERD, thyroid problem hypothyroidism    Musculoskeletal     Abdominal    Substance History      OB/GYN          Other        ROS/Med Hx Other: Additional History:  Allergies    Echo:  · Estimated left ventricular EF = 60% Left ventricular systolic function is normal.     Indications  Chest pain     Technically satisfactory study.  Mitral valve is structurally normal.  Mild mitral regurgitation.  Tricuspid valve is structurally normal.  Aortic valve is structurally normal.  Pulmonic valve could not be well visualized.  No evidence for tricuspid or aortic regurgitation is seen by Doppler study.  Left atrium is enlarged.  Right atrium is normal in size.  Left ventricle is normal in size and contractility with ejection fraction of 60%.  Right ventricle is normal in size.  Atrial septum is intact.  Aorta is normal.  No pericardial effusion or intracardiac thrombus is seen.     Impression  Left atrial enlargement.  Mild mitral regurgitation  Otherwise, structurally and functionally normal cardiac valves.  Left ventricular size and contractility is normal with ejection fraction of 60%.      Stress Test:  Good exercise tolerance.     Normal stress Cardiolite test.     Gated SPECT images revealed normal left ventricular size and contractility with ejection fraction of 76%.        PSH:  EYE SURGERY CARDIAC SURGERY                Anesthesia Plan    ASA 3     general     (Patient identified; pre-operative vital signs, all relevant labs/studies, complete  medical/surgical/anesthetic history, full medication list, full allergy list, and NPO status obtained/reviewed; physical assessment performed; anesthetic options, side effects, potential complications, risks, and benefits discussed; questions answered; written anesthesia consent obtained; patient cleared for procedure; anesthesia machine and equipment checked and functioning)  intravenous induction     Anesthetic plan, risks, benefits, and alternatives have been provided, discussed and informed consent has been obtained with: patient.    Plan discussed with CRNA and CAA.    CODE STATUS:

## 2024-04-06 ENCOUNTER — HOSPITAL ENCOUNTER (INPATIENT)
Facility: HOSPITAL | Age: 89
LOS: 3 days | Discharge: NURSING, REHAB OR LONG-TERM CARE | End: 2024-04-10
Attending: ORTHOPAEDIC SURGERY | Admitting: ORTHOPAEDIC SURGERY
Payer: MEDICARE

## 2024-04-06 ENCOUNTER — ANESTHESIA (OUTPATIENT)
Dept: PERIOP | Facility: HOSPITAL | Age: 89
End: 2024-04-06
Payer: MEDICARE

## 2024-04-06 PROCEDURE — G0378 HOSPITAL OBSERVATION PER HR: HCPCS

## 2024-04-06 PROCEDURE — 25810000003 SODIUM CHLORIDE 0.9 % SOLUTION: Performed by: ORTHOPAEDIC SURGERY

## 2024-04-06 PROCEDURE — 63710000001 HYDROCODONE-ACETAMINOPHEN 5-325 MG TABLET: Performed by: ORTHOPAEDIC SURGERY

## 2024-04-06 PROCEDURE — 0LQL0ZZ REPAIR RIGHT UPPER LEG TENDON, OPEN APPROACH: ICD-10-PCS | Performed by: ORTHOPAEDIC SURGERY

## 2024-04-06 PROCEDURE — A9270 NON-COVERED ITEM OR SERVICE: HCPCS | Performed by: ORTHOPAEDIC SURGERY

## 2024-04-06 PROCEDURE — 25810000003 LACTATED RINGERS PER 1000 ML: Performed by: ANESTHESIOLOGIST ASSISTANT

## 2024-04-06 PROCEDURE — 25010000002 CEFAZOLIN PER 500 MG: Performed by: ORTHOPAEDIC SURGERY

## 2024-04-06 PROCEDURE — 25010000002 HYDROMORPHONE 1 MG/ML SOLUTION: Performed by: ANESTHESIOLOGIST ASSISTANT

## 2024-04-06 PROCEDURE — 25010000002 DEXAMETHASONE PER 1 MG: Performed by: ANESTHESIOLOGIST ASSISTANT

## 2024-04-06 PROCEDURE — 25810000003 LACTATED RINGERS PER 1000 ML: Performed by: ANESTHESIOLOGY

## 2024-04-06 PROCEDURE — 25010000002 FENTANYL CITRATE (PF) 50 MCG/ML SOLUTION: Performed by: ANESTHESIOLOGIST ASSISTANT

## 2024-04-06 PROCEDURE — C1889 IMPLANT/INSERT DEVICE, NOC: HCPCS | Performed by: ORTHOPAEDIC SURGERY

## 2024-04-06 PROCEDURE — 63710000001 ASPIRIN 81 MG CHEWABLE TABLET: Performed by: ORTHOPAEDIC SURGERY

## 2024-04-06 PROCEDURE — 25010000002 ONDANSETRON PER 1 MG: Performed by: ANESTHESIOLOGIST ASSISTANT

## 2024-04-06 PROCEDURE — 25010000002 BUPIVACAINE (PF) 0.25 % SOLUTION: Performed by: ORTHOPAEDIC SURGERY

## 2024-04-06 PROCEDURE — 63710000001 SENNOSIDES-DOCUSATE 8.6-50 MG TABLET: Performed by: ORTHOPAEDIC SURGERY

## 2024-04-06 DEVICE — SUT FW 5 W .5 CIR CUT NDL 48M AR7211: Type: IMPLANTABLE DEVICE | Site: LEG | Status: FUNCTIONAL

## 2024-04-06 DEVICE — DEV CONTRL TISS STRATAFIX SPIRAL MNCRYL UD 3/0 PLS 30CM: Type: IMPLANTABLE DEVICE | Site: LEG | Status: FUNCTIONAL

## 2024-04-06 RX ORDER — OXYCODONE HYDROCHLORIDE 5 MG/1
10 TABLET ORAL EVERY 4 HOURS PRN
Status: DISCONTINUED | OUTPATIENT
Start: 2024-04-06 | End: 2024-04-06 | Stop reason: HOSPADM

## 2024-04-06 RX ORDER — SODIUM CHLORIDE 0.9 % (FLUSH) 0.9 %
10 SYRINGE (ML) INJECTION AS NEEDED
Status: DISCONTINUED | OUTPATIENT
Start: 2024-04-06 | End: 2024-04-06 | Stop reason: HOSPADM

## 2024-04-06 RX ORDER — EPHEDRINE SULFATE 5 MG/ML
INJECTION INTRAVENOUS AS NEEDED
Status: DISCONTINUED | OUTPATIENT
Start: 2024-04-06 | End: 2024-04-06 | Stop reason: SURG

## 2024-04-06 RX ORDER — MIDAZOLAM HYDROCHLORIDE 1 MG/ML
2 INJECTION INTRAMUSCULAR; INTRAVENOUS
Status: DISCONTINUED | OUTPATIENT
Start: 2024-04-06 | End: 2024-04-06 | Stop reason: HOSPADM

## 2024-04-06 RX ORDER — ASPIRIN 81 MG/1
81 TABLET, CHEWABLE ORAL 2 TIMES DAILY
Status: DISCONTINUED | OUTPATIENT
Start: 2024-04-06 | End: 2024-04-10 | Stop reason: HOSPADM

## 2024-04-06 RX ORDER — LABETALOL HYDROCHLORIDE 5 MG/ML
5 INJECTION, SOLUTION INTRAVENOUS
Status: DISCONTINUED | OUTPATIENT
Start: 2024-04-06 | End: 2024-04-06 | Stop reason: HOSPADM

## 2024-04-06 RX ORDER — PROMETHAZINE HYDROCHLORIDE 25 MG/1
25 TABLET ORAL ONCE AS NEEDED
Status: DISCONTINUED | OUTPATIENT
Start: 2024-04-06 | End: 2024-04-06 | Stop reason: HOSPADM

## 2024-04-06 RX ORDER — HYDROCODONE BITARTRATE AND ACETAMINOPHEN 5; 325 MG/1; MG/1
2 TABLET ORAL EVERY 4 HOURS PRN
Status: DISCONTINUED | OUTPATIENT
Start: 2024-04-06 | End: 2024-04-10 | Stop reason: HOSPADM

## 2024-04-06 RX ORDER — DEXAMETHASONE SODIUM PHOSPHATE 4 MG/ML
INJECTION, SOLUTION INTRA-ARTICULAR; INTRALESIONAL; INTRAMUSCULAR; INTRAVENOUS; SOFT TISSUE AS NEEDED
Status: DISCONTINUED | OUTPATIENT
Start: 2024-04-06 | End: 2024-04-06 | Stop reason: SURG

## 2024-04-06 RX ORDER — HYDROCODONE BITARTRATE AND ACETAMINOPHEN 5; 325 MG/1; MG/1
1 TABLET ORAL EVERY 4 HOURS PRN
Status: DISCONTINUED | OUTPATIENT
Start: 2024-04-06 | End: 2024-04-10 | Stop reason: HOSPADM

## 2024-04-06 RX ORDER — IPRATROPIUM BROMIDE AND ALBUTEROL SULFATE 2.5; .5 MG/3ML; MG/3ML
3 SOLUTION RESPIRATORY (INHALATION) ONCE AS NEEDED
Status: DISCONTINUED | OUTPATIENT
Start: 2024-04-06 | End: 2024-04-06 | Stop reason: HOSPADM

## 2024-04-06 RX ORDER — SODIUM CHLORIDE, SODIUM LACTATE, POTASSIUM CHLORIDE, CALCIUM CHLORIDE 600; 310; 30; 20 MG/100ML; MG/100ML; MG/100ML; MG/100ML
INJECTION, SOLUTION INTRAVENOUS CONTINUOUS PRN
Status: DISCONTINUED | OUTPATIENT
Start: 2024-04-06 | End: 2024-04-06 | Stop reason: SURG

## 2024-04-06 RX ORDER — HYDROXYZINE HYDROCHLORIDE 25 MG/1
25 TABLET, FILM COATED ORAL 3 TIMES DAILY PRN
Status: DISCONTINUED | OUTPATIENT
Start: 2024-04-06 | End: 2024-04-10 | Stop reason: HOSPADM

## 2024-04-06 RX ORDER — ACETAMINOPHEN 325 MG/1
650 TABLET ORAL ONCE AS NEEDED
Status: ACTIVE | OUTPATIENT
Start: 2024-04-06 | End: 2024-04-07

## 2024-04-06 RX ORDER — LEVOTHYROXINE SODIUM 88 UG/1
88 TABLET ORAL
Status: DISCONTINUED | OUTPATIENT
Start: 2024-04-07 | End: 2024-04-10 | Stop reason: HOSPADM

## 2024-04-06 RX ORDER — FAMOTIDINE 20 MG/1
20 TABLET, FILM COATED ORAL DAILY
Status: DISCONTINUED | OUTPATIENT
Start: 2024-04-06 | End: 2024-04-10 | Stop reason: HOSPADM

## 2024-04-06 RX ORDER — HYDRALAZINE HYDROCHLORIDE 20 MG/ML
5 INJECTION INTRAMUSCULAR; INTRAVENOUS
Status: DISCONTINUED | OUTPATIENT
Start: 2024-04-06 | End: 2024-04-06 | Stop reason: HOSPADM

## 2024-04-06 RX ORDER — DIPHENHYDRAMINE HYDROCHLORIDE 50 MG/ML
12.5 INJECTION INTRAMUSCULAR; INTRAVENOUS
Status: DISCONTINUED | OUTPATIENT
Start: 2024-04-06 | End: 2024-04-06 | Stop reason: HOSPADM

## 2024-04-06 RX ORDER — ONDANSETRON 2 MG/ML
4 INJECTION INTRAMUSCULAR; INTRAVENOUS ONCE AS NEEDED
Status: DISCONTINUED | OUTPATIENT
Start: 2024-04-06 | End: 2024-04-06 | Stop reason: HOSPADM

## 2024-04-06 RX ORDER — NALOXONE HCL 0.4 MG/ML
0.4 VIAL (ML) INJECTION
Status: DISCONTINUED | OUTPATIENT
Start: 2024-04-06 | End: 2024-04-10 | Stop reason: HOSPADM

## 2024-04-06 RX ORDER — LIDOCAINE HYDROCHLORIDE 10 MG/ML
INJECTION, SOLUTION EPIDURAL; INFILTRATION; INTRACAUDAL; PERINEURAL AS NEEDED
Status: DISCONTINUED | OUTPATIENT
Start: 2024-04-06 | End: 2024-04-06 | Stop reason: SURG

## 2024-04-06 RX ORDER — SODIUM CHLORIDE 0.9 % (FLUSH) 0.9 %
1-10 SYRINGE (ML) INJECTION AS NEEDED
Status: DISCONTINUED | OUTPATIENT
Start: 2024-04-06 | End: 2024-04-10 | Stop reason: HOSPADM

## 2024-04-06 RX ORDER — EPHEDRINE SULFATE 5 MG/ML
5 INJECTION INTRAVENOUS ONCE AS NEEDED
Status: DISCONTINUED | OUTPATIENT
Start: 2024-04-06 | End: 2024-04-06 | Stop reason: HOSPADM

## 2024-04-06 RX ORDER — AMOXICILLIN 250 MG
2 CAPSULE ORAL 2 TIMES DAILY
Status: DISCONTINUED | OUTPATIENT
Start: 2024-04-06 | End: 2024-04-10 | Stop reason: HOSPADM

## 2024-04-06 RX ORDER — SODIUM CHLORIDE, SODIUM LACTATE, POTASSIUM CHLORIDE, CALCIUM CHLORIDE 600; 310; 30; 20 MG/100ML; MG/100ML; MG/100ML; MG/100ML
9 INJECTION, SOLUTION INTRAVENOUS CONTINUOUS PRN
Status: DISCONTINUED | OUTPATIENT
Start: 2024-04-06 | End: 2024-04-06 | Stop reason: HOSPADM

## 2024-04-06 RX ORDER — LORAZEPAM 2 MG/ML
1 INJECTION INTRAMUSCULAR
Status: DISCONTINUED | OUTPATIENT
Start: 2024-04-06 | End: 2024-04-06 | Stop reason: HOSPADM

## 2024-04-06 RX ORDER — SODIUM CHLORIDE 0.9 % (FLUSH) 0.9 %
10 SYRINGE (ML) INJECTION EVERY 12 HOURS SCHEDULED
Status: DISCONTINUED | OUTPATIENT
Start: 2024-04-06 | End: 2024-04-10 | Stop reason: HOSPADM

## 2024-04-06 RX ORDER — PHENYLEPHRINE HCL IN 0.9% NACL 1 MG/10 ML
SYRINGE (ML) INTRAVENOUS AS NEEDED
Status: DISCONTINUED | OUTPATIENT
Start: 2024-04-06 | End: 2024-04-06 | Stop reason: SURG

## 2024-04-06 RX ORDER — SODIUM CHLORIDE 0.9 % (FLUSH) 0.9 %
10 SYRINGE (ML) INJECTION EVERY 12 HOURS SCHEDULED
Status: DISCONTINUED | OUTPATIENT
Start: 2024-04-06 | End: 2024-04-06 | Stop reason: HOSPADM

## 2024-04-06 RX ORDER — PROMETHAZINE HYDROCHLORIDE 25 MG/1
25 SUPPOSITORY RECTAL ONCE AS NEEDED
Status: DISCONTINUED | OUTPATIENT
Start: 2024-04-06 | End: 2024-04-06 | Stop reason: HOSPADM

## 2024-04-06 RX ORDER — ONDANSETRON 2 MG/ML
INJECTION INTRAMUSCULAR; INTRAVENOUS AS NEEDED
Status: DISCONTINUED | OUTPATIENT
Start: 2024-04-06 | End: 2024-04-06 | Stop reason: SURG

## 2024-04-06 RX ORDER — DIPHENHYDRAMINE HCL 25 MG
25 CAPSULE ORAL
Status: DISCONTINUED | OUTPATIENT
Start: 2024-04-06 | End: 2024-04-06 | Stop reason: HOSPADM

## 2024-04-06 RX ORDER — DIPHENHYDRAMINE HYDROCHLORIDE 50 MG/ML
12.5 INJECTION INTRAMUSCULAR; INTRAVENOUS ONCE AS NEEDED
Status: DISCONTINUED | OUTPATIENT
Start: 2024-04-06 | End: 2024-04-06 | Stop reason: HOSPADM

## 2024-04-06 RX ORDER — POLYETHYLENE GLYCOL 3350 17 G/17G
17 POWDER, FOR SOLUTION ORAL DAILY PRN
Status: DISCONTINUED | OUTPATIENT
Start: 2024-04-06 | End: 2024-04-10 | Stop reason: HOSPADM

## 2024-04-06 RX ORDER — AMLODIPINE BESYLATE 5 MG/1
10 TABLET ORAL
Status: DISCONTINUED | OUTPATIENT
Start: 2024-04-06 | End: 2024-04-10 | Stop reason: HOSPADM

## 2024-04-06 RX ORDER — SODIUM CHLORIDE 9 MG/ML
40 INJECTION, SOLUTION INTRAVENOUS AS NEEDED
Status: DISCONTINUED | OUTPATIENT
Start: 2024-04-06 | End: 2024-04-10 | Stop reason: HOSPADM

## 2024-04-06 RX ORDER — BISACODYL 5 MG/1
5 TABLET, DELAYED RELEASE ORAL DAILY PRN
Status: DISCONTINUED | OUTPATIENT
Start: 2024-04-06 | End: 2024-04-10 | Stop reason: HOSPADM

## 2024-04-06 RX ORDER — ONDANSETRON 4 MG/1
4 TABLET, ORALLY DISINTEGRATING ORAL EVERY 6 HOURS PRN
Status: DISCONTINUED | OUTPATIENT
Start: 2024-04-06 | End: 2024-04-10 | Stop reason: HOSPADM

## 2024-04-06 RX ORDER — BISACODYL 10 MG
10 SUPPOSITORY, RECTAL RECTAL DAILY PRN
Status: DISCONTINUED | OUTPATIENT
Start: 2024-04-06 | End: 2024-04-10 | Stop reason: HOSPADM

## 2024-04-06 RX ORDER — FENTANYL CITRATE 50 UG/ML
50 INJECTION, SOLUTION INTRAMUSCULAR; INTRAVENOUS
Status: DISCONTINUED | OUTPATIENT
Start: 2024-04-06 | End: 2024-04-06 | Stop reason: HOSPADM

## 2024-04-06 RX ORDER — NALOXONE HCL 0.4 MG/ML
0.4 VIAL (ML) INJECTION AS NEEDED
Status: DISCONTINUED | OUTPATIENT
Start: 2024-04-06 | End: 2024-04-06 | Stop reason: HOSPADM

## 2024-04-06 RX ORDER — FENTANYL CITRATE 50 UG/ML
INJECTION, SOLUTION INTRAMUSCULAR; INTRAVENOUS AS NEEDED
Status: DISCONTINUED | OUTPATIENT
Start: 2024-04-06 | End: 2024-04-06 | Stop reason: SURG

## 2024-04-06 RX ORDER — BUPIVACAINE HYDROCHLORIDE 2.5 MG/ML
INJECTION, SOLUTION EPIDURAL; INFILTRATION; INTRACAUDAL AS NEEDED
Status: DISCONTINUED | OUTPATIENT
Start: 2024-04-06 | End: 2024-04-06 | Stop reason: HOSPADM

## 2024-04-06 RX ORDER — ATENOLOL 25 MG/1
25 TABLET ORAL
Status: DISCONTINUED | OUTPATIENT
Start: 2024-04-06 | End: 2024-04-10 | Stop reason: HOSPADM

## 2024-04-06 RX ORDER — SODIUM CHLORIDE 9 MG/ML
100 INJECTION, SOLUTION INTRAVENOUS CONTINUOUS
Status: DISCONTINUED | OUTPATIENT
Start: 2024-04-06 | End: 2024-04-10 | Stop reason: HOSPADM

## 2024-04-06 RX ADMIN — FENTANYL CITRATE 100 MCG: 50 INJECTION, SOLUTION INTRAMUSCULAR; INTRAVENOUS at 08:17

## 2024-04-06 RX ADMIN — AMLODIPINE BESYLATE 10 MG: 5 TABLET ORAL at 15:05

## 2024-04-06 RX ADMIN — HYDROCODONE BITARTRATE AND ACETAMINOPHEN 2 TABLET: 5; 325 TABLET ORAL at 20:34

## 2024-04-06 RX ADMIN — SODIUM CHLORIDE, SODIUM LACTATE, POTASSIUM CHLORIDE, AND CALCIUM CHLORIDE: .6; .31; .03; .02 INJECTION, SOLUTION INTRAVENOUS at 08:13

## 2024-04-06 RX ADMIN — SODIUM CHLORIDE, POTASSIUM CHLORIDE, SODIUM LACTATE AND CALCIUM CHLORIDE 9 ML/HR: 600; 310; 30; 20 INJECTION, SOLUTION INTRAVENOUS at 07:33

## 2024-04-06 RX ADMIN — Medication 100 MCG: at 08:26

## 2024-04-06 RX ADMIN — EPHEDRINE SULFATE 10 MG: 5 INJECTION INTRAVENOUS at 08:41

## 2024-04-06 RX ADMIN — Medication 10 ML: at 15:06

## 2024-04-06 RX ADMIN — HYDROMORPHONE HYDROCHLORIDE 0.5 MG: 1 INJECTION, SOLUTION INTRAMUSCULAR; INTRAVENOUS; SUBCUTANEOUS at 09:20

## 2024-04-06 RX ADMIN — SODIUM CHLORIDE 100 ML/HR: 9 INJECTION, SOLUTION INTRAVENOUS at 15:03

## 2024-04-06 RX ADMIN — ASPIRIN 81 MG CHEWABLE TABLET 81 MG: 81 TABLET CHEWABLE at 20:34

## 2024-04-06 RX ADMIN — HYDROMORPHONE HYDROCHLORIDE 0.5 MG: 1 INJECTION, SOLUTION INTRAMUSCULAR; INTRAVENOUS; SUBCUTANEOUS at 12:34

## 2024-04-06 RX ADMIN — ONDANSETRON 4 MG: 2 INJECTION INTRAMUSCULAR; INTRAVENOUS at 08:22

## 2024-04-06 RX ADMIN — ATENOLOL 25 MG: 25 TABLET ORAL at 15:05

## 2024-04-06 RX ADMIN — EPHEDRINE SULFATE 15 MG: 5 INJECTION INTRAVENOUS at 08:46

## 2024-04-06 RX ADMIN — DEXAMETHASONE SODIUM PHOSPHATE 8 MG: 4 INJECTION, SOLUTION INTRAMUSCULAR; INTRAVENOUS at 08:22

## 2024-04-06 RX ADMIN — DOCUSATE SODIUM AND SENNOSIDES 2 TABLET: 8.6; 5 TABLET, FILM COATED ORAL at 20:34

## 2024-04-06 RX ADMIN — LIDOCAINE HYDROCHLORIDE 50 MG: 10 INJECTION, SOLUTION EPIDURAL; INFILTRATION; INTRACAUDAL; PERINEURAL at 08:20

## 2024-04-06 RX ADMIN — CEFAZOLIN 2 G: 2 INJECTION, POWDER, FOR SOLUTION INTRAMUSCULAR; INTRAVENOUS at 08:23

## 2024-04-06 RX ADMIN — SODIUM CHLORIDE 2000 MG: 900 INJECTION INTRAVENOUS at 15:03

## 2024-04-06 RX ADMIN — HYDROMORPHONE HYDROCHLORIDE 0.5 MG: 1 INJECTION, SOLUTION INTRAMUSCULAR; INTRAVENOUS; SUBCUTANEOUS at 09:36

## 2024-04-06 RX ADMIN — FAMOTIDINE 20 MG: 20 TABLET, FILM COATED ORAL at 15:05

## 2024-04-06 RX ADMIN — Medication 10 ML: at 20:35

## 2024-04-06 NOTE — ANESTHESIA POSTPROCEDURE EVALUATION
Patient: Sol Milton    Procedure Summary       Date: 04/06/24 Room / Location: [facility] OR 08 / [facility] OR    Anesthesia Start: 0813 Anesthesia Stop: 0903    Procedure: SUTURE OF QUADRICEPS MUSCLE RUPTURE (Right: Thigh) Diagnosis:       Other specified injury of right quadriceps muscle, fascia and tendon, initial encounter      (Other specified injury of right quadriceps muscle, fascia and tendon, initial encounter [S76.191A])    Surgeons: Tesfaye Lucero II, MD Provider: Jarrod Deluna MD    Anesthesia Type: general ASA Status: 3            Anesthesia Type: general    Vitals  Vitals Value Taken Time   /48 04/06/24 1303   Temp 97.5 °F (36.4 °C) 04/06/24 0900   Pulse 62 04/06/24 1303   Resp 13 04/06/24 1235   SpO2 99 % 04/06/24 1303   Vitals shown include unfiled device data.        Post Anesthesia Care and Evaluation    Patient location during evaluation: PACU  Patient participation: complete - patient participated  Level of consciousness: awake  Pain scale: See nurse's notes for pain score.  Pain management: adequate    Airway patency: patent  Anesthetic complications: No anesthetic complications  PONV Status: none  Cardiovascular status: acceptable  Respiratory status: acceptable and spontaneous ventilation  Hydration status: acceptable    Comments: Patient seen and examined postoperatively; vital signs stable; SpO2 greater than or equal to 90%; cardiopulmonary status stable; nausea/vomiting adequately controlled; pain adequately controlled; no apparent anesthesia complications; patient discharged from anesthesia care when discharge criteria were met

## 2024-04-06 NOTE — ANESTHESIA PROCEDURE NOTES
Airway  Date/Time: 4/6/2024 8:20 AM    Indications and Patient Condition  Indications for airway management: airway protection    Preoxygenated: yes  MILS maintained throughout  Mask difficulty assessment: 0 - not attempted    Final Airway Details  Final airway type: supraglottic airway      Successful airway: [place]  Size 3     Assessment: lips, teeth, and gum same as pre-op and atraumatic intubation

## 2024-04-06 NOTE — H&P
Orthopaedic Surgery  History & Physical  Dr. RICHI Lucero II  [phone]    HPI:  Patient is a 89 y.o. Not  or  female who presents with a right quadricep tendon tear.  She was actually scheduled for surgery on Monday but she had eaten that morning and surgery had to be canceled.  She was rescheduled for today.    MEDICAL HISTORY  Past Medical History:   Diagnosis Date    Disease of thyroid gland     Hyperlipidemia     Hypertension     Myocardial infarction 2009     Past Surgical History:   Procedure Laterality Date    CARDIAC SURGERY      EYE SURGERY       Prior to Admission medications    Medication Sig Start Date End Date Taking? Authorizing Provider   amLODIPine (NORVASC) 10 MG tablet  12/16/14   Emergency, Nurse Odom RN   aspirin 81 MG chewable tablet Chew 1 tablet Daily.    ProviderPrasanth MD   atenolol (TENORMIN) 25 MG tablet  3/12/12   Emergency, Nurse Odom RN   Cholecalciferol (VITAMIN D3) 5000 units capsule capsule VITAMIN D3 5000 UNIT CAPS 3/10/16   Jayme, Nurse Odom RN   clobetasol (TEMOVATE) 0.05 % external solution APPLY TO SCALP 2 TO 3 TIMES PER WEEK AS NEEDED FOR ITCHING AND FLAKING 1/4/23   Prasanth Hutchinson MD   ezetimibe (ZETIA) 10 MG tablet  4/24/18   Nurse Ilene Delacruz RN   hydrOXYzine (ATARAX) 10 MG tablet TAKE 2 TABLETS BY MOUTH ONCE DAILY AT BEDTIME AS NEEDED FOR SLEEP. (PLEASE START WITH ONE TABLET AND INCREASE TO 2 TABLETS IF NEEDED) 8/17/22   Prasanth Hutchinson MD   levothyroxine (SYNTHROID, LEVOTHROID) 88 MCG tablet SYNTHROID 88 MCG TABS 12/16/14   Emergency, Nurse Odom RN   Loratadine 10 MG capsule CLARITIN 10 MG CAPS 12/16/14   Jayme, Nurse Odom RN   mupirocin (BACTROBAN) 2 % ointment Apply 1 Application topically to the appropriate area as directed 2 (Two) Times a Day. 2/14/24   Ester Head APRN   Premarin 0.625 MG/GM vaginal cream Insert 0.5 g into the vagina 2 (Two) Times a Week. 2/14/24   Prasanth Hutchinson MD   raloxifene  (EVISTA) 60 MG tablet Take 1 tablet by mouth Daily. 11/23/19   Provider, Prasanth, MD     Allergies   Allergen Reactions    Adhesive Tape Itching     While wearing holter monitor itching to site where adhesive is  to skin    Penicillin G Rash    Prednisone Nausea And Vomiting    Latex Rash     Most Recent Immunizations   Administered Date(s) Administered    COVID-19 (MODERNA) 1st,2nd,3rd Dose Monovalent 10/26/2021    COVID-19 (MODERNA) Monovalent Original Booster 04/18/2022    Flu Vaccine Intradermal Quad 18-64YR 09/30/2013    Fluzone High Dose =>65 Years ([employer] ONLY) 10/11/2019    Pneumococcal Conjugate 13-Valent (PCV13) 11/25/2014    Shingrix 01/27/2020    TD Preservative Free (Tenivac) 02/14/2024     Social History     Tobacco Use    Smoking status: Never     Passive exposure: Never    Smokeless tobacco: Never   Substance Use Topics    Alcohol use: No      Social History     Substance and Sexual Activity   Drug Use Defer       REVIEW OF SYSTEMS:  Head: negative for headache  Respiratory: negative for shortness of breath.   Cardiovascular: negative for chest pain.   Gastrointestinal: negative abdominal pain.   Neurological: negative for LOC  Psychiatric/Behavioral: negative for memory loss.   All other systems reviewed and are negative    VITALS: There were no vitals taken for this visit. There is no height or weight on file to calculate BMI.    PHYSICAL EXAM:   CONSTITUTIONAL: A&Ox3, No acute distress  LUNGS: Equal chest rise, no shortness of air  CARDIOVASCULAR: palpable peripheral pulses  SKIN: no skin lesions in the area examined  LYMPH: no lymphadenopathy in the area examined      RADIOLOGY REVIEW:   No radiology results for the last 7 days    LABS:   Results for the past 24 hours: No results found for this or any previous visit (from the past 24 hour(s)).    IMPRESSION:  Patient is a 89 y.o. Not  or  female with right quadricep tendon tear    PLAN:   Admited to: Tesfaye Kenney  "Nic WILKINS MD  Disposition: Right open quadricep tendon repair    R \"Elbert\" Nic WILKINS MD  Orthopaedic Surgery  [facility]  [phone] - [place] Office  [phone] - [place] Office    "

## 2024-04-06 NOTE — PLAN OF CARE
Goal Outcome Evaluation:            Admit from PACU                                   Continue Lipitor

## 2024-04-06 NOTE — OP NOTE
[facility] Open Repair Operative Note  Dr. RICHI Jones” Nic II  [phone]    PATIENT NAME: Sol Milton  MRN: [id number]  : 1935 AGE: 89 y.o. GENDER: female  DATE OF OPERATION: 2024  PREOPERATIVE DIAGNOSIS: Quadriceps Tendon Tear  POSTOPERATIVE DIAGNOSIS: Quadriceps Tendon Tear  OPERATION PERFORMED: Open Right Quadriceps Tendon Tear Repair  SURGEON: Tesfaye Lucero MD  Circulator: Rubi Moreno RN; Jose Alfredo Langford RN  Scrub Person: Corine Clay  ANESTHESIA: General  ASSISTANT: None   ESTIMATED BLOOD LOSS: Minimal  SPONGE AND NEEDLE COUNT: Correct  INDICATIONS: This patient was noted to have a quadriceps tendon tear. A discussion of operative versus nonoperative treatment was had with the patient. They elected to undergo open repair of the quadriceps tendon. The risks of surgery were discussed and included the risk of anesthesia, infection, damage to neurovascular structures, repair loosening/failure, the need for further procedures, medical complications, loss of range of motion, arthritis, and others. No guarantees were made. The patient wished to proceed with surgery and a surgical consent was signed.  COMPONENTS:   Fiberwire #5    PERTINENT FINDINGS: Quadriceps Tendon Tear    DETAILS OF PROCEDURE:  The patient was met in the preoperative area. The site was marked. The consent and H&P were reviewed. The patient was then wheeled back to the operative suite and transferred to the operative table. The patient underwent anesthesia. A tourniquet was placed on the upper thigh. Excess hair along the incision was removed with clippers. Surgical alcohol was used to thoroughly clean the operative area.    The leg was then prepped and draped in the normal sterile fashion, which included ChloraPrep and multiple layers of sterile drapes. After a surgical timeout in which administration of preoperative antibiotics and the surgical site were confirmed, the tourniquet was put up.    A midline knee  "incision was utilized.  Dissection was carried down to the patella and retinaculum. The rent in the retinaculum was utilized to help exposed the patella and the quadriceps tendon. The knee was washed out of any hematoma. A clamp was then placed onto the quadriceps tendon to allow for retraction. The edges of the quadriceps tendon were cleaned up using a knife. The quadriceps stump on the proximal patella was cleaned off, and the bone was prepared to allow for good bony healing of the quadriceps tendon.    QUADRICEPS REPAIR  I made 3 drill holes in the patella from proximal to distal. 2 FiberWire stitches were sewn into the quadriceps tendon using a [place] technique. The 4 ends of the FiberWire stitches were then inserted through the drill holes in the patella using a suture passer. Using the sutures, the quadriceps tendon stump was pulled up to the patella. The sutures were then tied under tension with the knee in full extension at the distal end of the patella.    RETINACULAR CLOSURE  The retinaculum was tightly closed using FiberWire in a running locked suture. The FiberWire was tied in multiple locations to help prevent loosening.    The knee wound was thoroughly irrigated. The soft tissues about the knee were injected with a local anesthetic. The skin was closed in layers. A sterile dressing was applied as well as a knee immobilizer. The patient was awoken from anesthesia, moved to the [facility] and taken to the recovery room in stable condition. Sponge and needle count were correct. There were no complications. Patient tolerated the procedure well.    R \"Elbert\" Nic WILKINS MD  Orthopaedic Surgery  [facility]  [phone]                "

## 2024-04-07 LAB
ANION GAP SERPL CALCULATED.3IONS-SCNC: 11 MMOL/L (ref 5–15)
BASOPHILS # BLD AUTO: 0.02 10*3/MM3 (ref 0–0.2)
BASOPHILS NFR BLD AUTO: 0.2 % (ref 0–1.5)
BUN SERPL-MCNC: 25 MG/DL (ref 8–23)
BUN/CREAT SERPL: 24.8 (ref 7–25)
CALCIUM SPEC-SCNC: 9.3 MG/DL (ref 8.6–10.5)
CHLORIDE SERPL-SCNC: 103 MMOL/L (ref 98–107)
CO2 SERPL-SCNC: 22 MMOL/L (ref 22–29)
CREAT SERPL-MCNC: 1.01 MG/DL (ref 0.57–1)
DEPRECATED RDW RBC AUTO: 48.1 FL (ref 37–54)
EGFRCR SERPLBLD CKD-EPI 2021: 54 ML/MIN/1.73
EOSINOPHIL # BLD AUTO: 0 10*3/MM3 (ref 0–0.4)
EOSINOPHIL NFR BLD AUTO: 0 % (ref 0.3–6.2)
ERYTHROCYTE [DISTWIDTH] IN BLOOD BY AUTOMATED COUNT: 14.2 % (ref 12.3–15.4)
GLUCOSE SERPL-MCNC: 140 MG/DL (ref 65–99)
HCT VFR BLD AUTO: 33.8 % (ref 34–46.6)
HCT VFR BLD AUTO: 37.7 % (ref 34–46.6)
HGB BLD-MCNC: 10.7 G/DL (ref 12–15.9)
HGB BLD-MCNC: 12.3 G/DL (ref 12–15.9)
IMM GRANULOCYTES # BLD AUTO: 0.04 10*3/MM3 (ref 0–0.05)
IMM GRANULOCYTES NFR BLD AUTO: 0.4 % (ref 0–0.5)
LYMPHOCYTES # BLD AUTO: 1.28 10*3/MM3 (ref 0.7–3.1)
LYMPHOCYTES NFR BLD AUTO: 11.5 % (ref 19.6–45.3)
MCH RBC QN AUTO: 29.8 PG (ref 26.6–33)
MCHC RBC AUTO-ENTMCNC: 32.6 G/DL (ref 31.5–35.7)
MCV RBC AUTO: 91.3 FL (ref 79–97)
MONOCYTES # BLD AUTO: 0.81 10*3/MM3 (ref 0.1–0.9)
MONOCYTES NFR BLD AUTO: 7.3 % (ref 5–12)
NEUTROPHILS NFR BLD AUTO: 80.6 % (ref 42.7–76)
NEUTROPHILS NFR BLD AUTO: 9 10*3/MM3 (ref 1.7–7)
NRBC BLD AUTO-RTO: 0 /100 WBC (ref 0–0.2)
PLATELET # BLD AUTO: 259 10*3/MM3 (ref 140–450)
PMV BLD AUTO: 10.8 FL (ref 6–12)
POTASSIUM SERPL-SCNC: 4.9 MMOL/L (ref 3.5–5.2)
RBC # BLD AUTO: 4.13 10*6/MM3 (ref 3.77–5.28)
SODIUM SERPL-SCNC: 136 MMOL/L (ref 136–145)
WBC NRBC COR # BLD AUTO: 11.15 10*3/MM3 (ref 3.4–10.8)

## 2024-04-07 PROCEDURE — 85025 COMPLETE CBC W/AUTO DIFF WBC: CPT | Performed by: ORTHOPAEDIC SURGERY

## 2024-04-07 PROCEDURE — 63710000001 SENNOSIDES-DOCUSATE 8.6-50 MG TABLET: Performed by: ORTHOPAEDIC SURGERY

## 2024-04-07 PROCEDURE — 63710000001 AMLODIPINE 5 MG TABLET: Performed by: ORTHOPAEDIC SURGERY

## 2024-04-07 PROCEDURE — A9270 NON-COVERED ITEM OR SERVICE: HCPCS | Performed by: ORTHOPAEDIC SURGERY

## 2024-04-07 PROCEDURE — 80048 BASIC METABOLIC PNL TOTAL CA: CPT | Performed by: ORTHOPAEDIC SURGERY

## 2024-04-07 PROCEDURE — 97162 PT EVAL MOD COMPLEX 30 MIN: CPT

## 2024-04-07 PROCEDURE — 85014 HEMATOCRIT: CPT | Performed by: ORTHOPAEDIC SURGERY

## 2024-04-07 PROCEDURE — 85018 HEMOGLOBIN: CPT | Performed by: ORTHOPAEDIC SURGERY

## 2024-04-07 PROCEDURE — 63710000001 ASPIRIN 81 MG CHEWABLE TABLET: Performed by: ORTHOPAEDIC SURGERY

## 2024-04-07 PROCEDURE — 63710000001 LEVOTHYROXINE 88 MCG TABLET: Performed by: ORTHOPAEDIC SURGERY

## 2024-04-07 PROCEDURE — 63710000001 ATENOLOL 25 MG TABLET: Performed by: ORTHOPAEDIC SURGERY

## 2024-04-07 PROCEDURE — 63710000001 FAMOTIDINE 20 MG TABLET: Performed by: ORTHOPAEDIC SURGERY

## 2024-04-07 PROCEDURE — 25010000002 CEFAZOLIN PER 500 MG: Performed by: ORTHOPAEDIC SURGERY

## 2024-04-07 PROCEDURE — 25810000003 SODIUM CHLORIDE 0.9 % SOLUTION: Performed by: ORTHOPAEDIC SURGERY

## 2024-04-07 PROCEDURE — 63710000001 HYDROCODONE-ACETAMINOPHEN 5-325 MG TABLET: Performed by: ORTHOPAEDIC SURGERY

## 2024-04-07 RX ADMIN — HYDROXYZINE HYDROCHLORIDE 25 MG: 25 TABLET, FILM COATED ORAL at 19:32

## 2024-04-07 RX ADMIN — SODIUM CHLORIDE 2000 MG: 900 INJECTION INTRAVENOUS at 08:32

## 2024-04-07 RX ADMIN — SODIUM CHLORIDE 100 ML/HR: 9 INJECTION, SOLUTION INTRAVENOUS at 11:56

## 2024-04-07 RX ADMIN — HYDROCODONE BITARTRATE AND ACETAMINOPHEN 2 TABLET: 5; 325 TABLET ORAL at 01:32

## 2024-04-07 RX ADMIN — AMLODIPINE BESYLATE 10 MG: 5 TABLET ORAL at 08:32

## 2024-04-07 RX ADMIN — ATENOLOL 25 MG: 25 TABLET ORAL at 08:32

## 2024-04-07 RX ADMIN — DOCUSATE SODIUM AND SENNOSIDES 2 TABLET: 8.6; 5 TABLET, FILM COATED ORAL at 20:28

## 2024-04-07 RX ADMIN — SODIUM CHLORIDE 100 ML/HR: 9 INJECTION, SOLUTION INTRAVENOUS at 21:07

## 2024-04-07 RX ADMIN — SODIUM CHLORIDE 2000 MG: 900 INJECTION INTRAVENOUS at 01:18

## 2024-04-07 RX ADMIN — ASPIRIN 81 MG CHEWABLE TABLET 81 MG: 81 TABLET CHEWABLE at 20:27

## 2024-04-07 RX ADMIN — HYDROCODONE BITARTRATE AND ACETAMINOPHEN 1 TABLET: 5; 325 TABLET ORAL at 08:32

## 2024-04-07 RX ADMIN — DOCUSATE SODIUM AND SENNOSIDES 2 TABLET: 8.6; 5 TABLET, FILM COATED ORAL at 08:32

## 2024-04-07 RX ADMIN — FAMOTIDINE 20 MG: 20 TABLET, FILM COATED ORAL at 08:32

## 2024-04-07 RX ADMIN — Medication 10 ML: at 08:32

## 2024-04-07 RX ADMIN — Medication 10 ML: at 20:28

## 2024-04-07 RX ADMIN — SODIUM CHLORIDE 100 ML/HR: 9 INJECTION, SOLUTION INTRAVENOUS at 01:18

## 2024-04-07 RX ADMIN — ASPIRIN 81 MG CHEWABLE TABLET 81 MG: 81 TABLET CHEWABLE at 08:32

## 2024-04-07 RX ADMIN — LEVOTHYROXINE SODIUM 88 MCG: 88 TABLET ORAL at 06:25

## 2024-04-07 NOTE — THERAPY EVALUATION
Patient Name: Sol Milton  : 1935    MRN: [id number]                              Today's Date: 2024       Admit Date: 2024    Visit Dx: No diagnosis found.  Patient Active Problem List   Diagnosis    Acute bronchitis with bronchospasm    Asthma    Bronchospasm    Cat scratch    Gastroesophageal reflux disease    History of left heart catheterization (LHC)    Hyperlipidemia    Hypertension    Hypothyroidism    Osteoporosis    Status post percutaneous transluminal coronary angioplasty    Quadriceps tendon rupture     Past Medical History:   Diagnosis Date    Disease of thyroid gland     Hyperlipidemia     Hypertension     Myocardial infarction 2009     Past Surgical History:   Procedure Laterality Date    CARDIAC SURGERY      EYE SURGERY        General Information       Row Name 24 1833          Physical Therapy Time and Intention    Document Type evaluation  -EL     Mode of Treatment individual therapy;physical therapy  -EL       Row Name 24 1833          General Information    Prior Level of Function independent:;all household mobility;ADL's  -EL     Existing Precautions/Restrictions non-weight bearing  -EL       Row Name 24 1833          Living Environment    People in Home spouse  -EL       Row Name 24 1833          Stairs Within Home, Primary    Number of Stairs, Within Home, Primary eleven;other (see comments)  flight of steps to bedroom/bathroom  -EL       Row Name 24 1833          Cognition    Orientation Status (Cognition) oriented x 4  -EL       Row Name 24 1833          Safety Issues, Functional Mobility    Impairments Affecting Function (Mobility) pain;range of motion (ROM);strength  -EL               User Key  (r) = Recorded By, (t) = Taken By, (c) = Cosigned By      Initials Name Provider Type    Danny Bowles PT Physical Therapist                   Mobility       Row Name 24          Bed Mobility    Bed Mobility supine-sit  -EL      Supine-Sit [place] (Bed Mobility) moderate assist (50% patient effort)  -EL     Assistive Device (Bed Mobility) draw sheet  -EL       Row Name 04/07/24 1834          Bed-Chair Transfer    Bed-Chair [place] (Transfers) maximum assist (25% patient effort);other (see comments)  second person to assist with keeping RLE NWB  -EL       Row Name 04/07/24 1834          Sit-Stand Transfer    Sit-Stand [place] (Transfers) maximum assist (25% patient effort)  -       Row Name 04/07/24 1834          Mobility    Extremity Weight-bearing Status right lower extremity  -EL     Right Lower Extremity (Weight-bearing Status) non weight-bearing (NWB)  -EL               User Key  (r) = Recorded By, (t) = Taken By, (c) = Cosigned By      Initials Name Provider Type    Danny Bowles, HINA Physical Therapist                   Obj/Interventions       Row Name 04/07/24 1835          Range of Motion Comprehensive    General Range of Motion lower extremity range of motion deficits identified  -EL     Comment, General Range of Motion brace locked into extension in RLE  -EL       [facility] Name 04/07/24 1835          Balance    Balance Assessment sitting static balance;standing static balance;standing dynamic balance  -EL     Static Sitting Balance contact guard  -EL     Static Standing Balance maximum assist  -EL     Dynamic Standing Balance maximum assist  -EL       [facility] Name 04/07/24 1835          Sensory Assessment (Somatosensory)    Sensory Assessment (Somatosensory) sensation intact  -EL               User Key  (r) = Recorded By, (t) = Taken By, (c) = Cosigned By      Initials Name Provider Type    Danny Bowles, PT Physical Therapist                   Goals/Plan       Row Name 04/07/24 1839          Bed Mobility Goal 1 (PT)    Activity/Assistive Device (Bed Mobility Goal 1, PT) bed mobility activities, all  -EL     [place] Level/Cues Needed (Bed Mobility Goal 1, PT) modified independence  -EL     Time Frame (Bed Mobility Goal  1, PT) long term goal (LTG);2 weeks  -EL       Row Name 04/07/24 1839          Transfer Goal 1 (PT)    Activity/Assistive Device (Transfer Goal 1, PT) transfers, all;walker, rolling  -EL     [place] Level/Cues Needed (Transfer Goal 1, PT) standby assist  -EL     Time Frame (Transfer Goal 1, PT) long term goal (LTG);2 weeks  -EL       Row Name 04/07/24 1839          Gait Training Goal 1 (PT)    Activity/Assistive Device (Gait Training Goal 1, PT) gait (walking locomotion);walker, rolling  -EL     [place] Level (Gait Training Goal 1, PT) standby assist  -EL     Distance (Gait Training Goal 1, PT) 15  -EL     Time Frame (Gait Training Goal 1, PT) long term goal (LTG);2 weeks  -EL       Row Name 04/07/24 1839          Therapy Assessment/Plan (PT)    Planned Therapy Interventions (PT) balance training;neuromuscular re-education;bed mobility training;transfer training;gait training;patient/family education;strengthening  -EL               User Key  (r) = Recorded By, (t) = Taken By, (c) = Cosigned By      Initials Name Provider Type    Danny Bowles, PT Physical Therapist                   Clinical Impression       Row Name 04/07/24 1836          Pain    Pretreatment Pain Rating 6/10  -EL     Posttreatment Pain Rating 8/10  -EL     Pain Location - Side/Orientation Right  -EL     Pain Location lower  -EL     Pain Location - extremity  -EL       Row Name 04/07/24 1836          Plan of Care Review    Plan of Care Reviewed With patient;spouse  -EL     Outcome Evaluation Pt is an 89 YO F POD 1 quad tendon repair. Pt is in hinge brace locked into extension and is to remain NWB at this time. Pt reports living home with spouse, typically is very independent with all ADLs, ambulation without AD and no other recent falls. This fall prior to admission was due to tripping when changing. Pt this date demonstrates fair strength, but has difficutly mobilizing while maintaining NWB in RLE. Pt attempted to stand twice wiht RWx  and was successful but was unable to pivot on LLE for transfers. PT then stood in front and provided MAX A for SPS with second person assisting maintaining NWB to RLE. Pt is not safe for return home at this time and will likely benefit from SNF with potential future transition to acute IP rehab.  -EL       Row Name 04/07/24 1836          Therapy Assessment/Plan (PT)    Rehab Potential (PT) good, to achieve stated therapy goals  -EL     Criteria for Skilled Interventions Met (PT) yes  -EL     Therapy Frequency (PT) 5 times/wk  -EL     Predicted Duration of Therapy Intervention (PT) until d/c  -EL       Row Name 04/07/24 1836          Vital Signs    O2 Delivery Pre Treatment room air  -EL     O2 Delivery Intra Treatment room air  -EL     O2 Delivery Post Treatment room air  -EL     Pre Patient Position Supine  -EL     Intra Patient Position Standing  -EL     Post Patient Position Sitting  -EL       Row Name 04/07/24 1836          Positioning and Restraints    Pre-Treatment Position in bed  -EL     Post Treatment Position chair  -EL     In Chair notified nsg;reclined;call light within reach;encouraged to call for assist;exit alarm on  PT returned for transfer back to bed  -EL               User Key  (r) = Recorded By, (t) = Taken By, (c) = Cosigned By      Initials Name Provider Type    Danny Bowles, PT Physical Therapist                   Outcome Measures       Row Name 04/07/24 1839 04/07/24 0832       How much help from another person do you currently need...    Turning from your back to your side while in flat bed without using bedrails? 3  -EL 3  -KS    Moving from lying on back to sitting on the side of a flat bed without bedrails? 2  -EL 3  -KS    Moving to and from a bed to a chair (including a wheelchair)? 2  -EL 3  -KS    Standing up from a chair using your arms (e.g., wheelchair, bedside chair)? 2  -EL 3  -KS    Climbing 3-5 steps with a railing? 1  -EL 2  -KS    To walk in hospital room? 1  -EL 2  -KS     -PAC 6 Clicks Score (PT) 11  -EL 16  -KS    Highest Level of Mobility Goal 4 --> Transfer to chair/commode  -EL 5 --> Static standing  -KS      Row Name 04/07/24 1839          Functional Assessment    Outcome Measure Options AM-PAC 6 Clicks Basic Mobility (PT)  -EL               User Key  (r) = Recorded By, (t) = Taken By, (c) = Cosigned By      Initials Name Provider Type    EL Danny Taylor, PT Physical Therapist    Tanya Trujillo RN Registered Nurse                                 Physical Therapy Education       Title: PT OT SLP Therapies (Done)       Topic: Physical Therapy (Done)       Point: Mobility training (Done)       Learning Progress Summary             Patient Acceptance, E,TB, VU by  at 4/7/2024 1840                         Point: Precautions (Done)       Learning Progress Summary             Patient Acceptance, E,TB, VU by  at 4/7/2024 1840                                         User Key       Initials Effective Dates Name Provider Type Discipline     06/23/20 -  Danny Taylor, PT Physical Therapist PT                  PT Recommendation and Plan  Planned Therapy Interventions (PT): balance training, neuromuscular re-education, bed mobility training, transfer training, gait training, patient/family education, strengthening  Plan of Care Reviewed With: patient, spouse  Outcome Evaluation: Pt is an 88 YO F POD 1 quad tendon repair. Pt is in hinge brace locked into extension and is to remain NWB at this time. Pt reports living home with spouse, typically is very independent with all ADLs, ambulation without AD and no other recent falls. This fall prior to admission was due to tripping when changing. Pt this date demonstrates fair strength, but has difficutly mobilizing while maintaining NWB in RLE. Pt attempted to stand twice wiht RWx and was successful but was unable to pivot on LLE for transfers. PT then stood in front and provided MAX A for SPS with second person assisting maintaining NWB to  RLE. Pt is not safe for return home at this time and will likely benefit from SNF with potential future transition to acute IP rehab.     Time Calculation:   PT Evaluation Complexity  History, PT Evaluation Complexity: 1-2 personal factors and/or comorbidities  Examination of Body Systems (PT Eval Complexity): total of 3 or more elements  Clinical Presentation (PT Evaluation Complexity): evolving  Clinical Decision Making (PT Evaluation Complexity): moderate complexity  Overall Complexity (PT Evaluation Complexity): moderate complexity     PT Charges       Row Name 04/07/24 1840             Time Calculation    Start Time 1437  -EL      Stop Time 1506  -EL      Time Calculation (min) 29 min  -EL      PT Received On 04/07/24  -EL      PT - Next Appointment 04/08/24  -EL      PT Goal Re-Cert Due Date 04/21/24  -EL                User Key  (r) = Recorded By, (t) = Taken By, (c) = Cosigned By      Initials Name Provider Type    Danny Bowles, PT Physical Therapist                  Therapy Charges for Today       Code Description Service Date Service Provider Modifiers Qty    [id number] HC PT EVAL MOD COMPLEXITY 4 4/7/2024 Danny Taylor, PT GP 1            PT G-Codes  Outcome Measure Options: AM-PAC 6 Clicks Basic Mobility (PT)  AM-PAC 6 Clicks Score (PT): 11  PT Discharge Summary  Anticipated Discharge Disposition (PT): skilled nursing facility    Danny Taylor PT  4/7/2024

## 2024-04-07 NOTE — PLAN OF CARE
Goal Outcome Evaluation:            Pt is resting in bed. Family at bedside. Awaiting to be seen by PT. Medicating pain per MAR. Brace on RLE. NWB RLE. Encouraged to use IS. VSS. Plan of care ongoing.

## 2024-04-07 NOTE — PLAN OF CARE
Goal Outcome Evaluation:  Plan of Care Reviewed With: patient, spouse           Outcome Evaluation: Pt is an 89 YO F POD 1 quad tendon repair. Pt is in hinge brace locked into extension and is to remain NWB at this time. Pt reports living home with spouse, typically is very independent with all ADLs, ambulation without AD and no other recent falls. This fall prior to admission was due to tripping when changing. Pt this date demonstrates fair strength, but has difficutly mobilizing while maintaining NWB in RLE. Pt attempted to stand twice wiht RWx and was successful but was unable to pivot on LLE for transfers. PT then stood in front and provided MAX A for SPS with second person assisting maintaining NWB to RLE. Pt is not safe for return home at this time and will likely benefit from SNF with potential future transition to acute IP rehab.      Anticipated Discharge Disposition (PT): skilled nursing facility

## 2024-04-07 NOTE — CASE MANAGEMENT/SOCIAL WORK
Discharge Planning Assessment   Curtis     Patient Name: Sol Milton  MRN: [id number]  Today's Date: 4/7/2024    Admit Date: 4/6/2024    Plan: From Home: SNF recommended.  No Precert required.  PASRR approved.  Pending pt choices.   Discharge Needs Assessment       Row Name 04/07/24 1759       Living Environment    People in Home spouse    Current Living Arrangements home    Potentially Unsafe Housing Conditions none    In the past 12 months has the electric, gas, oil, or water company threatened to shut off services in your home? No    Primary Care Provided by self    Provides Primary Care For no one    Family Caregiver if Needed spouse    Quality of Family Relationships helpful;involved;supportive    Able to Return to Prior Arrangements yes       Resource/Environmental Concerns    Resource/Environmental Concerns none    Transportation Concerns none       Transportation Needs    In the past 12 months, has lack of transportation kept you from medical appointments or from getting medications? no    In the past 12 months, has lack of transportation kept you from meetings, work, or from getting things needed for daily living? No       Food Insecurity    Within the past 12 months, you worried that your food would run out before you got the money to buy more. Never true    Within the past 12 months, the food you bought just didn't last and you didn't have money to get more. Never true       Transition Planning    Patient/Family Anticipates Transition to home with family    Patient/Family Anticipated Services at Transition none    Transportation Anticipated car, drives self;family or friend will provide       Discharge Needs Assessment    Readmission Within the Last 30 Days no previous admission in last 30 days    Equipment Currently Used at Home commode;queta katz    Concerns to be Addressed discharge planning    Anticipated Changes Related to Illness none    Equipment Needed After Discharge none    Provided  Post Acute Provider List? Yes    Post Acute Provider List Nursing Home                   Discharge Plan       Row Name 04/07/24 7231       Plan    Plan Comments met with patient and spouse at bedside.  provided snf list.  pcp and pharm verified.  therapy coming into see patient.  after eval, she will review list and talk to family. will provide choices on Monday.  patient previously I with all ADLS prior to quad. rupture.  in agreement with plan to go to SNF and then acute rehab once able to bear weight.      Row Name 04/07/24 2267       Plan    Plan From Home: SNF recommended.  No Precert required.  PASRR approved.  Pending pt choices.                  Continued Care and Services - Admitted Since 4/6/2024    No active coordination exists for this encounter.       Expected Discharge Date and Time       Expected Discharge Date Expected Discharge Time    Apr 10, 2024            Demographic Summary       Row Name 04/07/24 1712       General Information    Admission Type other (see comments)  outpatient    Referral Source admission list    Reason for Consult discharge planning    Preferred Language English                   Functional Status       Row Name 04/07/24 1728       Functional Status    Usual Activity Tolerance good    Current Activity Tolerance poor       Functional Status, IADL    Medications independent    Meal Preparation independent    Housekeeping independent    Laundry independent    Shopping independent       Mental Status    General Appearance WDL WDL       Mental Status Summary    Recent Changes in Mental Status/Cognitive Functioning no changes                   Psychosocial    No documentation.                  Abuse/Neglect    No documentation.                  Legal    No documentation.                  Substance Abuse    No documentation.                  Patient Forms    No documentation.                     Kinga Watknis RN

## 2024-04-07 NOTE — CASE MANAGEMENT/SOCIAL WORK
Continued Stay Note  BRAYDEN Acevedo     Patient Name: Sol Milton  MRN: [id number]  Today's Date: 4/7/2024    Admit Date: 4/6/2024        Discharge Plan       Row Name 04/07/24 1231       Plan    Plan Comments CM received message from KAYLYN [place].  Upon reviewing patient is NWB to lower extremity pending PT eval.  mahin recommended SNF at this time while NWWOJCIECH and KAYLYN will continue to follow at SNF for when she would be more appropriate for inpatient rehab and able to tolerate more intensive therapy.  Notified UR RN and SW for PASRR                            Kinga Watkins RN

## 2024-04-07 NOTE — PROGRESS NOTES
Patient is postop day 1 quadricep tendon repair.  Plan daily dry dressing changes starting tomorrow.  Nonweightbearing and no range of motion to the knee.  Plan will be discharged to rehab

## 2024-04-07 NOTE — PLAN OF CARE
Goal Outcome Evaluation: Upon change in shift, patient in bed talking with family. Patient pain controlled. See MAR for interventions. Tolerating antibiotics well. Able to make needs know and resting in bed at this time with no complaints.

## 2024-04-08 PROCEDURE — 97551 CAREGIVER TRAING EA ADDL 15: CPT

## 2024-04-08 PROCEDURE — 97530 THERAPEUTIC ACTIVITIES: CPT

## 2024-04-08 PROCEDURE — 25810000003 SODIUM CHLORIDE 0.9 % SOLUTION: Performed by: ORTHOPAEDIC SURGERY

## 2024-04-08 RX ADMIN — ASPIRIN 81 MG CHEWABLE TABLET 81 MG: 81 TABLET CHEWABLE at 08:13

## 2024-04-08 RX ADMIN — SODIUM CHLORIDE 100 ML/HR: 9 INJECTION, SOLUTION INTRAVENOUS at 16:34

## 2024-04-08 RX ADMIN — AMLODIPINE BESYLATE 10 MG: 5 TABLET ORAL at 08:13

## 2024-04-08 RX ADMIN — HYDROCODONE BITARTRATE AND ACETAMINOPHEN 1 TABLET: 5; 325 TABLET ORAL at 19:25

## 2024-04-08 RX ADMIN — DOCUSATE SODIUM AND SENNOSIDES 2 TABLET: 8.6; 5 TABLET, FILM COATED ORAL at 21:03

## 2024-04-08 RX ADMIN — ASPIRIN 81 MG CHEWABLE TABLET 81 MG: 81 TABLET CHEWABLE at 21:03

## 2024-04-08 RX ADMIN — ATENOLOL 25 MG: 25 TABLET ORAL at 08:13

## 2024-04-08 RX ADMIN — DOCUSATE SODIUM AND SENNOSIDES 2 TABLET: 8.6; 5 TABLET, FILM COATED ORAL at 08:14

## 2024-04-08 RX ADMIN — Medication 10 ML: at 21:03

## 2024-04-08 RX ADMIN — LEVOTHYROXINE SODIUM 88 MCG: 88 TABLET ORAL at 05:31

## 2024-04-08 RX ADMIN — FAMOTIDINE 20 MG: 20 TABLET, FILM COATED ORAL at 08:13

## 2024-04-08 NOTE — PLAN OF CARE
Goal Outcome Evaluation:                      Sol Milton presents with functional mobility impairments which indicate the need for skilled intervention. Pt educated on weight bearing status through RLE prior to mobilization. Pt performed four STS on this date with min A x2/RW and this PTA's foot under pt's RLE. Pt with difficulty maintaining NWB through RLE during transfer. Pt unsafe to perform 3 point gait due to increased weight bearing through RLE. Pt performed stand pivot from chair to bed with mod-max A x2/RW with difficulty maintaining NWB status on RLE.  Tolerating session today without incident. Will continue to follow and progress as tolerated.

## 2024-04-08 NOTE — PLAN OF CARE
Goal Outcome Evaluation:      Patient is alert and orientated. Pain management utilized - see MAR. Brace is present on the RLE. DC to SNF. Plan of care ongoing.       Problem: Adult Inpatient Plan of Care  Goal: Plan of Care Review  Outcome: Ongoing, Progressing  Goal: Patient-Specific Goal (Individualized)  Outcome: Ongoing, Progressing  Goal: Absence of Hospital-Acquired Illness or Injury  Outcome: Ongoing, Progressing  Intervention: Identify and Manage Fall Risk  Recent Flowsheet Documentation  Taken 4/8/2024 1240 by Maddi Langford LPN  Safety Promotion/Fall Prevention:   safety round/check completed   room organization consistent   nonskid shoes/slippers when out of bed   fall prevention program maintained   activity supervised   clutter free environment maintained  Taken 4/8/2024 1000 by Maddi Langford LPN  Safety Promotion/Fall Prevention:   safety round/check completed   room organization consistent   nonskid shoes/slippers when out of bed   fall prevention program maintained   clutter free environment maintained   activity supervised  Taken 4/8/2024 0800 by Maddi Langford LPN  Safety Promotion/Fall Prevention:   safety round/check completed   room organization consistent   nonskid shoes/slippers when out of bed   clutter free environment maintained   activity supervised   fall prevention program maintained  Taken 4/8/2024 0749 by Maddi Langford LPN  Safety Promotion/Fall Prevention:   safety round/check completed   room organization consistent   nonskid shoes/slippers when out of bed   fall prevention program maintained   clutter free environment maintained   activity supervised  Intervention: Prevent Skin Injury  Recent Flowsheet Documentation  Taken 4/8/2024 1240 by Maddi Langford LPN  Body Position: (in chair) weight shifting  Taken 4/8/2024 0749 by Maddi Langford LPN  Skin Protection:   adhesive use limited   tubing/devices free from skin contact   incontinence pads utilized  Intervention: Prevent and Manage  VTE (Venous Thromboembolism) Risk  Recent Flowsheet Documentation  Taken 4/8/2024 0749 by Maddi Langford LPN  Activity Management: up in chair  VTE Prevention/Management: (patient up in chair)   bilateral   sequential compression devices off   other (see comments)  Range of Motion: active ROM (range of motion) encouraged  Goal: Optimal Comfort and Wellbeing  Outcome: Ongoing, Progressing  Intervention: Monitor Pain and Promote Comfort  Recent Flowsheet Documentation  Taken 4/8/2024 1240 by Maddi Langford LPN  Pain Management Interventions:   care clustered   relaxation techniques promoted   quiet environment facilitated  Taken 4/8/2024 0749 by Maddi Langford LPN  Pain Management Interventions:   quiet environment facilitated   pillow support provided   position adjusted   care clustered   breathing exercises   relaxation techniques promoted  Intervention: Provide Person-Centered Care  Recent Flowsheet Documentation  Taken 4/8/2024 0749 by Maddi Langford LPN  Trust Relationship/Rapport:   care explained   thoughts/feelings acknowledged   questions answered  Goal: Readiness for Transition of Care  Outcome: Ongoing, Progressing     Problem: Skin Injury Risk Increased  Goal: Skin Health and Integrity  Outcome: Ongoing, Progressing  Intervention: Optimize Skin Protection  Recent Flowsheet Documentation  Taken 4/8/2024 0749 by Maddi Langford LPN  Pressure Reduction Techniques:   frequent weight shift encouraged   weight shift assistance provided   pressure points protected   heels elevated off bed  Pressure Reduction Devices: pressure-redistributing mattress utilized  Skin Protection:   adhesive use limited   tubing/devices free from skin contact   incontinence pads utilized     Problem: Hypertension Comorbidity  Goal: Blood Pressure in Desired Range  Outcome: Ongoing, Progressing  Intervention: Maintain Blood Pressure Management  Recent Flowsheet Documentation  Taken 4/8/2024 1240 by Maddi Langford LPN  Medication  Review/Management: medications reviewed  Taken 4/8/2024 1000 by Maddi Langford LPN  Medication Review/Management: medications reviewed  Taken 4/8/2024 0800 by Maddi Langford LPN  Medication Review/Management: medications reviewed  Taken 4/8/2024 0749 by Maddi Langford LPN  Medication Review/Management: medications reviewed     Problem: Bleeding (Surgery Nonspecified)  Goal: Absence of Bleeding  Outcome: Ongoing, Progressing     Problem: Bowel Motility Impaired (Surgery Nonspecified)  Goal: Effective Bowel Elimination  Outcome: Ongoing, Progressing     Problem: Fluid and Electrolyte Imbalance (Surgery Nonspecified)  Goal: Fluid and Electrolyte Balance  Outcome: Ongoing, Progressing  Intervention: Monitor and Manage Fluid and Electrolyte Balance  Recent Flowsheet Documentation  Taken 4/8/2024 0749 by Maddi Langford LPN  Fluid/Electrolyte Management: fluids provided     Problem: Glycemic Control Impaired (Surgery Nonspecified)  Goal: Blood Glucose Level Within Targeted Range  Outcome: Ongoing, Progressing     Problem: Infection (Surgery Nonspecified)  Goal: Absence of Infection Signs and Symptoms  Outcome: Ongoing, Progressing     Problem: Ongoing Anesthesia Effects (Surgery Nonspecified)  Goal: Anesthesia/Sedation Recovery  Outcome: Ongoing, Progressing  Intervention: Optimize Anesthesia Recovery  Recent Flowsheet Documentation  Taken 4/8/2024 1240 by Maddi Langford LPN  Safety Promotion/Fall Prevention:   safety round/check completed   room organization consistent   nonskid shoes/slippers when out of bed   fall prevention program maintained   activity supervised   clutter free environment maintained  Taken 4/8/2024 1000 by Maddi Langford LPN  Safety Promotion/Fall Prevention:   safety round/check completed   room organization consistent   nonskid shoes/slippers when out of bed   fall prevention program maintained   clutter free environment maintained   activity supervised  Taken 4/8/2024 0800 by Maddi Langford LPN  Safety  Promotion/Fall Prevention:   safety round/check completed   room organization consistent   nonskid shoes/slippers when out of bed   clutter free environment maintained   activity supervised   fall prevention program maintained  Taken 4/8/2024 0749 by Maddi Langford LPN  Safety Promotion/Fall Prevention:   safety round/check completed   room organization consistent   nonskid shoes/slippers when out of bed   fall prevention program maintained   clutter free environment maintained   activity supervised  Reorientation Measures: clock in view     Problem: Pain (Surgery Nonspecified)  Goal: Acceptable Pain Control  Outcome: Ongoing, Progressing  Intervention: Prevent or Manage Pain  Recent Flowsheet Documentation  Taken 4/8/2024 1240 by Maddi Langford LPN  Pain Management Interventions:   care clustered   relaxation techniques promoted   quiet environment facilitated  Taken 4/8/2024 0749 by Maddi Langford LPN  Pain Management Interventions:   quiet environment facilitated   pillow support provided   position adjusted   care clustered   breathing exercises   relaxation techniques promoted     Problem: Postoperative Nausea and Vomiting (Surgery Nonspecified)  Goal: Nausea and Vomiting Relief  Outcome: Ongoing, Progressing     Problem: Postoperative Urinary Retention (Surgery Nonspecified)  Goal: Effective Urinary Elimination  Outcome: Ongoing, Progressing     Problem: Respiratory Compromise (Surgery Nonspecified)  Goal: Effective Oxygenation and Ventilation  Outcome: Ongoing, Progressing     Problem: Fall Injury Risk  Goal: Absence of Fall and Fall-Related Injury  Outcome: Ongoing, Progressing  Intervention: Identify and Manage Contributors  Recent Flowsheet Documentation  Taken 4/8/2024 1240 by Maddi Langford LPN  Medication Review/Management: medications reviewed  Taken 4/8/2024 1000 by Maddi Langford LPN  Medication Review/Management: medications reviewed  Taken 4/8/2024 0800 by Maddi Langford LPN  Medication  Review/Management: medications reviewed  Taken 4/8/2024 0749 by Maddi Langford LPN  Medication Review/Management: medications reviewed  Intervention: Promote Injury-Free Environment  Recent Flowsheet Documentation  Taken 4/8/2024 1240 by Maddi Langford LPN  Safety Promotion/Fall Prevention:   safety round/check completed   room organization consistent   nonskid shoes/slippers when out of bed   fall prevention program maintained   activity supervised   clutter free environment maintained  Taken 4/8/2024 1000 by Maddi Langford LPN  Safety Promotion/Fall Prevention:   safety round/check completed   room organization consistent   nonskid shoes/slippers when out of bed   fall prevention program maintained   clutter free environment maintained   activity supervised  Taken 4/8/2024 0800 by Maddi Langford LPN  Safety Promotion/Fall Prevention:   safety round/check completed   room organization consistent   nonskid shoes/slippers when out of bed   clutter free environment maintained   activity supervised   fall prevention program maintained  Taken 4/8/2024 0749 by Maddi Langford LPN  Safety Promotion/Fall Prevention:   safety round/check completed   room organization consistent   nonskid shoes/slippers when out of bed   fall prevention program maintained   clutter free environment maintained   activity supervised

## 2024-04-08 NOTE — THERAPY TREATMENT NOTE
"Subjective: Pt agreeable to therapeutic plan of care. Pt expressed being up in chair for ~two and a half hours asking to return to bed.    Objective:     WB'ing status: R Lower Extremity Non Weight Bearing    Precautions: High falls risk and Weight-bearing restriction     Bed mobility - Min-A, Mod-A, and Assist x 2; mod A with BLE  Transfers - Min-A, Mod-A, Assist x 2, and with rolling walker; Min A x2 STS; Mod-max A x2 stand pivot  Ambulation - N/A or Not attempted.    Vitals: WNL    Pain: 2 VAS   Location: RLE  Intervention for pain: Repositioned, Increased Activity, and Therapeutic Presence    Education: Provided education on the importance of mobility in the acute care setting, Verbal/Tactile Cues, Transfer Training, Gait Training, and WB'ing status    Assessment: Sol Milton presents with functional mobility impairments which indicate the need for skilled intervention. Pt educated on weight bearing status through RLE prior to mobilization. Pt performed four STS on this date with min A x2/RW and this PTA's foot under pt's RLE. Pt with difficulty maintaining NWB through RLE during transfer. Pt unsafe to perform 3 point gait due to increased weight bearing through RLE. Pt performed stand pivot from chair to bed with mod-max A x2/RW with difficulty maintaining NWB status on RLE.  Tolerating session today without incident. Will continue to follow and progress as tolerated.     Plan/Recommendations:   If medically appropriate, Moderate Intensity Therapy recommended post-acute care. This is recommended as therapy feels the patient would require 3-4 days per week and wouldn't tolerate \"3 hour daily\" rehab intensity. SNF would be the preferred choice. If the patient does not agree to SNF, arrange HH or OP depending on home bound status. If patient is medically complex, consider LTACH. Pt requires no DME at discharge.     Pt desires Skilled Rehab placement at discharge. Pt cooperative; agreeable to therapeutic " recommendations and plan of care.         Basic Mobility 6-click:  Rollin = Total, A lot = 2, A little = 3; 4 = None  Supine>Sit:   1 = Total, A lot = 2, A little = 3; 4 = None   Sit>Stand with arms:  1 = Total, A lot = 2, A little = 3; 4 = None  Bed>Chair:   1 = Total, A lot = 2, A little = 3; 4 = None  Ambulate in room:  1 = Total, A lot = 2, A little = 3; 4 = None  3-5 Steps with railin = Total, A lot = 2, A little = 3; 4 = None  Score: 11    Modified [place]: N/A = No pre-op stroke/TIA    Post-Tx Position: Supine with HOB Elevated, Alarms activated, and Call light and personal items within reach  PPE: gloves and surgical mask

## 2024-04-08 NOTE — CASE MANAGEMENT/SOCIAL WORK
Continued Stay Note  BRAYDEN Acevedo     Patient Name: Sol Milton  MRN: [id number]  Today's Date: 4/8/2024    Admit Date: 4/6/2024    Plan: Referral sent to [facility] pending accepted.  No precert required.  PASRR  approved.   Discharge Plan       Row Name 04/08/24 1649       Plan    Plan Referral sent to [facility] pending accepted.  No precert required.  PASRR  approved.    Plan Comments Spoke with patient and  regarding choices for snf.  they gave me the following choices: 1. [facility] 2. [facility].  Referral sent to [facility] pending acceptance.               Expected Discharge Date and Time       Expected Discharge Date Expected Discharge Time    Apr 10, 2024           Shireen Dale RN

## 2024-04-08 NOTE — PLAN OF CARE
Goal Outcome Evaluation:      Pt alert and oriented. Pt has no complaints of pain. Brace on RLE. VS stable. Plan of care ongoing.

## 2024-04-09 LAB
HCT VFR BLD AUTO: 30.6 % (ref 34–46.6)
HCT VFR BLD AUTO: 32.1 % (ref 34–46.6)
HGB BLD-MCNC: 10.2 G/DL (ref 12–15.9)
HGB BLD-MCNC: 9.9 G/DL (ref 12–15.9)

## 2024-04-09 PROCEDURE — 25810000003 SODIUM CHLORIDE 0.9 % SOLUTION: Performed by: ORTHOPAEDIC SURGERY

## 2024-04-09 PROCEDURE — 97116 GAIT TRAINING THERAPY: CPT

## 2024-04-09 PROCEDURE — 85018 HEMOGLOBIN: CPT | Performed by: ORTHOPAEDIC SURGERY

## 2024-04-09 PROCEDURE — 97530 THERAPEUTIC ACTIVITIES: CPT

## 2024-04-09 PROCEDURE — 85014 HEMATOCRIT: CPT | Performed by: ORTHOPAEDIC SURGERY

## 2024-04-09 PROCEDURE — 97535 SELF CARE MNGMENT TRAINING: CPT

## 2024-04-09 PROCEDURE — 97110 THERAPEUTIC EXERCISES: CPT

## 2024-04-09 RX ORDER — HYDROCODONE BITARTRATE AND ACETAMINOPHEN 5; 325 MG/1; MG/1
1 TABLET ORAL EVERY 4 HOURS PRN
Qty: 50 TABLET | Refills: 0 | Status: SHIPPED | OUTPATIENT
Start: 2024-04-09 | End: 2024-04-10

## 2024-04-09 RX ORDER — UBIDECARENONE 100 MG
100 CAPSULE ORAL DAILY
COMMUNITY

## 2024-04-09 RX ORDER — ONDANSETRON 4 MG/1
4 TABLET, FILM COATED ORAL EVERY 6 HOURS PRN
Qty: 30 TABLET | Refills: 0 | Status: SHIPPED | OUTPATIENT
Start: 2024-04-09 | End: 2024-04-10

## 2024-04-09 RX ORDER — ASPIRIN 81 MG/1
81 TABLET ORAL EVERY 12 HOURS
Qty: 60 TABLET | Refills: 0 | Status: SHIPPED | OUTPATIENT
Start: 2024-04-09 | End: 2024-04-10

## 2024-04-09 RX ADMIN — LEVOTHYROXINE SODIUM 88 MCG: 88 TABLET ORAL at 05:34

## 2024-04-09 RX ADMIN — ASPIRIN 81 MG CHEWABLE TABLET 81 MG: 81 TABLET CHEWABLE at 09:11

## 2024-04-09 RX ADMIN — AMLODIPINE BESYLATE 10 MG: 5 TABLET ORAL at 09:11

## 2024-04-09 RX ADMIN — FAMOTIDINE 20 MG: 20 TABLET, FILM COATED ORAL at 09:11

## 2024-04-09 RX ADMIN — ASPIRIN 81 MG CHEWABLE TABLET 81 MG: 81 TABLET CHEWABLE at 20:54

## 2024-04-09 RX ADMIN — Medication 10 ML: at 20:54

## 2024-04-09 RX ADMIN — DOCUSATE SODIUM AND SENNOSIDES 2 TABLET: 8.6; 5 TABLET, FILM COATED ORAL at 20:54

## 2024-04-09 RX ADMIN — SODIUM CHLORIDE 100 ML/HR: 9 INJECTION, SOLUTION INTRAVENOUS at 22:43

## 2024-04-09 RX ADMIN — HYDROCODONE BITARTRATE AND ACETAMINOPHEN 1 TABLET: 5; 325 TABLET ORAL at 09:11

## 2024-04-09 RX ADMIN — DOCUSATE SODIUM AND SENNOSIDES 2 TABLET: 8.6; 5 TABLET, FILM COATED ORAL at 09:11

## 2024-04-09 RX ADMIN — SODIUM CHLORIDE 100 ML/HR: 9 INJECTION, SOLUTION INTRAVENOUS at 02:20

## 2024-04-09 RX ADMIN — ATENOLOL 25 MG: 25 TABLET ORAL at 09:12

## 2024-04-09 RX ADMIN — HYDROCODONE BITARTRATE AND ACETAMINOPHEN 1 TABLET: 5; 325 TABLET ORAL at 17:50

## 2024-04-09 NOTE — DISCHARGE SUMMARY
Orthopaedic Discharge Summary  Dr. STODDARD “Elbert” Wilsons II  [phone]    NAME: Sol Milton PCP: Leydi Cox MD   :  MRN: [id number]  [id number] LOS:  ADMIT: 3 days  2024   AGE/SEX: 89 y.o. female DC:  tomorrow             Admitting Diagnosis: Other specified injury of right quadriceps muscle, fascia and tendon, initial encounter [S76.191A]  Quadriceps tendon rupture [S76.119A]    Surgery Performed: SUTURE OF QUADRICEPS MUSCLE RUPTURE    Discharge Medications:         Discharge Medications        New Medications        Instructions Start Date   aspirin 81 MG EC tablet  Replaces: aspirin 81 MG chewable tablet   81 mg, Oral, Every 12 Hours      HYDROcodone-acetaminophen 5-325 MG per tablet  Commonly known as: NORCO   1 tablet, Oral, Every 4 Hours PRN      ondansetron 4 MG tablet  Commonly known as: Zofran   4 mg, Oral, Every 6 Hours PRN             Continue These Medications        Instructions Start Date   amLODIPine 10 MG tablet  Commonly known as: NORVASC   10 mg, Oral, Daily      atenolol 25 MG tablet  Commonly known as: TENORMIN   25 mg, Oral, Daily      coenzyme Q10 100 MG capsule   100 mg, Oral, Daily      ezetimibe 10 MG tablet  Commonly known as: ZETIA   10 mg, Oral, Daily      levothyroxine 88 MCG tablet  Commonly known as: SYNTHROID, LEVOTHROID   Take 1 tablet by mouth Daily.      Loratadine 10 MG capsule   Take 1 capsule by mouth Daily.      Premarin 0.625 MG/GM vaginal cream  Generic drug: Estrogens Conjugated   0.5 g, Vaginal, 2 Times Weekly      QC TUMERIC COMPLEX PO   1 capsule, Oral, Daily      raloxifene 60 MG tablet  Commonly known as: EVISTA   1 tablet, Oral, Daily      vitamin D3 125 MCG (5000 UT) capsule capsule   Take 1 capsule by mouth Daily.             Stop These Medications      aspirin 81 MG chewable tablet  Replaced by: aspirin 81 MG EC tablet              Vitals:     Vitals:    24 0354 24 1117 247 04/10/24 0540   BP: 164/76 133/76 155/71 143/64   BP  Location: Left arm Left arm Left arm Left arm   Patient Position: Lying Sitting Lying Lying   Pulse: 66 74 71 61   Resp: 17 12 12 12   Temp: 98.3 °F (36.8 °C) 98.3 °F (36.8 °C) 97.9 °F (36.6 °C) 98.1 °F (36.7 °C)   TempSrc: Oral Oral Oral Oral   SpO2: 99% 97% 97% 98%   Weight:       Height:           Labs:      Admission on 04/06/2024   Component Date Value Ref Range Status    Glucose 04/07/2024 140 (H)  65 - 99 mg/dL Final    BUN 04/07/2024 25 (H)  8 - 23 mg/dL Final    Creatinine 04/07/2024 1.01 (H)  0.57 - 1.00 mg/dL Final    Sodium 04/07/2024 136  136 - 145 mmol/L Final    Potassium 04/07/2024 4.9  3.5 - 5.2 mmol/L Final    Slight hemolysis detected by analyzer. Result may be falsely elevated.    Chloride 04/07/2024 103  98 - 107 mmol/L Final    CO2 04/07/2024 22.0  22.0 - 29.0 mmol/L Final    Calcium 04/07/2024 9.3  8.6 - 10.5 mg/dL Final    BUN/Creatinine Ratio 04/07/2024 24.8  7.0 - 25.0 Final    Anion Gap 04/07/2024 11.0  5.0 - 15.0 mmol/L Final    eGFR 04/07/2024 54.0 (L)  >60.0 mL/min/1.73 Final    WBC 04/07/2024 11.15 (H)  3.40 - 10.80 10*3/mm3 Final    RBC 04/07/2024 4.13  3.77 - 5.28 10*6/mm3 Final    Hemoglobin 04/07/2024 12.3  12.0 - 15.9 g/dL Final    Hematocrit 04/07/2024 37.7  34.0 - 46.6 % Final    MCV 04/07/2024 91.3  79.0 - 97.0 fL Final    MCH 04/07/2024 29.8  26.6 - 33.0 pg Final    MCHC 04/07/2024 32.6  31.5 - 35.7 g/dL Final    RDW 04/07/2024 14.2  12.3 - 15.4 % Final    RDW-SD 04/07/2024 48.1  37.0 - 54.0 fl Final    MPV 04/07/2024 10.8  6.0 - 12.0 fL Final    Platelets 04/07/2024 259  140 - 450 10*3/mm3 Final    Neutrophil % 04/07/2024 80.6 (H)  42.7 - 76.0 % Final    Lymphocyte % 04/07/2024 11.5 (L)  19.6 - 45.3 % Final    Monocyte % 04/07/2024 7.3  5.0 - 12.0 % Final    Eosinophil % 04/07/2024 0.0 (L)  0.3 - 6.2 % Final    Basophil % 04/07/2024 0.2  0.0 - 1.5 % Final    Immature Grans % 04/07/2024 0.4  0.0 - 0.5 % Final    Neutrophils, Absolute 04/07/2024 9.00 (H)  1.70 - 7.00 10*3/mm3  Final    Lymphocytes, Absolute 04/07/2024 1.28  0.70 - 3.10 10*3/mm3 Final    Monocytes, Absolute 04/07/2024 0.81  0.10 - 0.90 10*3/mm3 Final    Eosinophils, Absolute 04/07/2024 0.00  0.00 - 0.40 10*3/mm3 Final    Basophils, Absolute 04/07/2024 0.02  0.00 - 0.20 10*3/mm3 Final    Immature Grans, Absolute 04/07/2024 0.04  0.00 - 0.05 10*3/mm3 Final    nRBC 04/07/2024 0.0  0.0 - 0.2 /100 WBC Final    Hemoglobin 04/07/2024 10.7 (L)  12.0 - 15.9 g/dL Final    Hematocrit 04/07/2024 33.8 (L)  34.0 - 46.6 % Final    Hemoglobin 04/09/2024 10.2 (L)  12.0 - 15.9 g/dL Final    Hematocrit 04/09/2024 32.1 (L)  34.0 - 46.6 % Final    Hemoglobin 04/09/2024 9.9 (L)  12.0 - 15.9 g/dL Final    Hematocrit 04/09/2024 30.6 (L)  34.0 - 46.6 % Final        No results found.    Hospital Course:   89 y.o. female was admitted to [facility] to services of Tesfaye Lucero II, MD with Other specified injury of right quadriceps muscle, fascia and tendon, initial encounter [S76.191A]  Quadriceps tendon rupture [S76.119A] on 4/6/2024 and underwent SUTURE OF QUADRICEPS MUSCLE RUPTURE. Post-operatively the patient transferred to the floor where the patient underwent mobilization therapy. Opioids were titrated to achieve appropriate pain management to allow for participation in mobilization exercises. Vital signs and laboratory values are now within safe parameters for discharge. The dressings and/or incision is intact without signs or symptoms of active infection. Operative extremity neurovascular status remains intact as compared to the preoperative exam. Appropriate education re: incision care, activity levels, medications, and follow up visits was completed and all questions were answered. The patient is now deemed stable for discharge.    SNF: The patient had a difficult time mobilizing sufficiently with physical therapy. Further rehabilitation was recommended in a SNF facility. Once a bed was available, and the patient was  "cleared, there were discharged to the SNF in good condition}.       R \"Elbert\" Nic WILKINS MD  Orthopaedic Surgery  [facility]  [phone]                                               "

## 2024-04-09 NOTE — THERAPY TREATMENT NOTE
"Subjective: Pt agreeable to therapeutic plan of care.    Objective:     Bed mobility - Supine to sitting Mod-A; Max A for scooting forward    Transfers - SqPS from EOB to chair Max-A while maintaining NWB RLE. X3 STS from BSC with mod-max Ax1 using RW while maintaining NWB RLE.     ADLs - DEP for LBD and pericare while standing using RW and mod-max A from another.     Ambulation - 0 feet N/A or Not attempted.    Therapeutic Exercise - 20 Reps L Lower Extremity AROM lying supine    Vitals: WNL    Pain: 6 VAS   Location: RLE  Intervention for pain: Repositioned, RN provided medication, Increased Activity, and Therapeutic Presence    Education: Provided education on the importance of mobility in the acute care setting, Verbal/Tactile Cues, ADL training, Transfer Training, Gait Training, and WB'ing status    Assessment: Sol Milton presents with functional mobility impairments which indicate the need for skilled intervention. Tolerating session today without incident. Pt requires mod-max A for bed mobility and functional transfers while PTA ensures pt maintains NWB status on RLE. Pt requires multiple standing trials using RW to complete pericare DEP from another. Once completed, pt kept standing with mod-max A while another took BSC away and brought recliner chair behind pt. Nursing staff educated on safest method to transfer pt back to bed when ready. Will continue to follow and progress as tolerated.     Plan/Recommendations:   If medically appropriate, Moderate Intensity Therapy recommended post-acute care. This is recommended as therapy feels the patient would require 3-4 days per week and wouldn't tolerate \"3 hour daily\" rehab intensity. SNF would be the preferred choice. If the patient does not agree to SNF, arrange HH or OP depending on home bound status. If patient is medically complex, consider LTACH. Pt requires no DME at discharge.     Pt desires Skilled Rehab placement at discharge. Pt cooperative; " agreeable to therapeutic recommendations and plan of care.         Basic Mobility 6-click:  Rollin = Total, A lot = 2, A little = 3; 4 = None  Supine>Sit:   1 = Total, A lot = 2, A little = 3; 4 = None   Sit>Stand with arms:  1 = Total, A lot = 2, A little = 3; 4 = None  Bed>Chair:   1 = Total, A lot = 2, A little = 3; 4 = None  Ambulate in room:  1 = Total, A lot = 2, A little = 3; 4 = None  3-5 Steps with railin = Total, A lot = 2, A little = 3; 4 = None  Score: 10    Modified [place]: N/A = No pre-op stroke/TIA    Post-Tx Position: Up in Chair, Alarms activated, and Call light and personal items within reach  PPE: gloves

## 2024-04-09 NOTE — CASE MANAGEMENT/SOCIAL WORK
Continued Stay Note  BRAYDEN Acevedo     Patient Name: Sol Milton  MRN: [id number]  Today's Date: 4/9/2024    Admit Date: 4/6/2024    Plan: Accepted to [facility].  Bed ready 4/10/24.  No precert required.  PASRR  approved.   Discharge Plan       Row Name 04/09/24 1459       Plan    Plan Accepted to [facility].  Bed ready 4/10/24.  No precert required.  PASRR  approved.    Patient/Family in Agreement with Plan yes    Plan Comments Accepted to [facility].  no precert needed  PASRR  approved,  Bed ready 4.10.24               Expected Discharge Date and Time       Expected Discharge Date Expected Discharge Time    Apr 10, 2024             Shireen Dale RN  Case  Manager

## 2024-04-09 NOTE — PLAN OF CARE
Goal Outcome Evaluation:      Pt alert and oriented. Pt complaints of pain managed with PO medication, see HEIDI Britton on RLE. Discharge to SNF, referral to [facility] pending acceptance. VS stable. Plan of care ongoing.

## 2024-04-09 NOTE — PROGRESS NOTES
Continuing to await rehab placement.  Daily dry dressing changes to begin today.  From my standpoint okay for discharge once patient ready and approved

## 2024-04-09 NOTE — PLAN OF CARE
"Goal Outcome Evaluation:  Assessment: Sol Milton presents with functional mobility impairments which indicate the need for skilled intervention. Tolerating session today without incident. Pt requires mod-max A for bed mobility and functional transfers while PTA ensures pt maintains NWB status on RLE. Pt requires multiple standing trials using RW to complete pericare DEP from another. Once completed, pt kept standing with mod-max A while another took BSC away and brought recliner chair behind pt. Nursing staff educated on safest method to transfer pt back to bed when ready. Will continue to follow and progress as tolerated.     Plan/Recommendations:   If medically appropriate, Moderate Intensity Therapy recommended post-acute care. This is recommended as therapy feels the patient would require 3-4 days per week and wouldn't tolerate \"3 hour daily\" rehab intensity. SNF would be the preferred choice. If the patient does not agree to SNF, arrange HH or OP depending on home bound status. If patient is medically complex, consider LTACH. Pt requires no DME at discharge.     Pt desires Skilled Rehab placement at discharge. Pt cooperative; agreeable to therapeutic recommendations and plan of care.     "

## 2024-04-09 NOTE — PLAN OF CARE
Goal Outcome Evaluation:      Patient doing well, pain treated per MAR, patient to dc to [facility] tomorrow, care plan ongoing

## 2024-04-09 NOTE — DISCHARGE INSTRUCTIONS
No weight and no range of motion to the knee for a total of 3 weeks postop until after she is seen in the office.      Daily dry dressing changes to the knee     brace to remain on at all times

## 2024-04-10 VITALS
DIASTOLIC BLOOD PRESSURE: 64 MMHG | WEIGHT: 114 LBS | TEMPERATURE: 98.1 F | HEART RATE: 61 BPM | RESPIRATION RATE: 12 BRPM | BODY MASS INDEX: 22.98 KG/M2 | HEIGHT: 59 IN | SYSTOLIC BLOOD PRESSURE: 143 MMHG | OXYGEN SATURATION: 98 %

## 2024-04-10 RX ORDER — ASPIRIN 81 MG/1
81 TABLET ORAL EVERY 12 HOURS
Qty: 60 TABLET | Refills: 0 | Status: SHIPPED | OUTPATIENT
Start: 2024-04-10 | End: 2024-05-10

## 2024-04-10 RX ORDER — HYDROCODONE BITARTRATE AND ACETAMINOPHEN 5; 325 MG/1; MG/1
1 TABLET ORAL EVERY 4 HOURS PRN
Qty: 50 TABLET | Refills: 0 | Status: SHIPPED | OUTPATIENT
Start: 2024-04-10

## 2024-04-10 RX ORDER — ONDANSETRON 4 MG/1
4 TABLET, FILM COATED ORAL EVERY 6 HOURS PRN
Qty: 30 TABLET | Refills: 0 | Status: SHIPPED | OUTPATIENT
Start: 2024-04-10

## 2024-04-10 RX ADMIN — ATENOLOL 25 MG: 25 TABLET ORAL at 08:50

## 2024-04-10 RX ADMIN — ASPIRIN 81 MG CHEWABLE TABLET 81 MG: 81 TABLET CHEWABLE at 08:49

## 2024-04-10 RX ADMIN — LEVOTHYROXINE SODIUM 88 MCG: 88 TABLET ORAL at 05:08

## 2024-04-10 RX ADMIN — HYDROCODONE BITARTRATE AND ACETAMINOPHEN 2 TABLET: 5; 325 TABLET ORAL at 08:49

## 2024-04-10 RX ADMIN — AMLODIPINE BESYLATE 10 MG: 5 TABLET ORAL at 08:50

## 2024-04-10 RX ADMIN — FAMOTIDINE 20 MG: 20 TABLET, FILM COATED ORAL at 08:50

## 2024-04-10 RX ADMIN — HYDROCODONE BITARTRATE AND ACETAMINOPHEN 2 TABLET: 5; 325 TABLET ORAL at 01:40

## 2024-04-10 NOTE — PLAN OF CARE
Goal Outcome Evaluation:      Pt alert and oriented. Pt complaints of pain managed with PO medication. Pt going to rehab later today. VS stable. Plan of care ongoing.

## 2024-04-10 NOTE — CASE MANAGEMENT/SOCIAL WORK
Case Management Discharge Note      Final Note: [facility]    Provided Post Acute Provider List?: Yes  Post Acute Provider List: Nursing Home    Selected Continued Care - Discharged on 4/10/2024 Admission date: 4/6/2024 - Discharge disposition: Skilled Nursing Facility (DC - External)      Destination Coordination complete.      Service Provider Selected Services Address Phone Fax Patient Preferred    [facility] [address] [place] [address] [phone] --                    Transportation Services  Private: Car    Final Discharge Disposition Code: 03 - skilled nursing facility (SNF)

## 2024-05-07 NOTE — CASE MANAGEMENT/SOCIAL WORK
Case Management Discharge Note      Final Note: Noted patient had breakfast. No surgery. Case rescheduled.  Chiquita DEY RN,CCP         Selected Continued Care - Discharged on 4/1/2024 Admission date: 4/1/2024 - Discharge disposition: Home or Self Care            Final Discharge Disposition Code: 01 - home or self-care

## 2024-06-12 ENCOUNTER — OFFICE VISIT (OUTPATIENT)
Dept: CARDIOLOGY | Facility: CLINIC | Age: 89
End: 2024-06-12
Payer: MEDICARE

## 2024-06-12 VITALS
OXYGEN SATURATION: 98 % | SYSTOLIC BLOOD PRESSURE: 117 MMHG | HEIGHT: 59 IN | HEART RATE: 66 BPM | WEIGHT: 100 LBS | BODY MASS INDEX: 20.16 KG/M2 | DIASTOLIC BLOOD PRESSURE: 73 MMHG

## 2024-06-12 DIAGNOSIS — E78.2 MIXED HYPERLIPIDEMIA: ICD-10-CM

## 2024-06-12 DIAGNOSIS — Z95.820 STATUS POST ANGIOPLASTY WITH STENT: Primary | ICD-10-CM

## 2024-06-12 DIAGNOSIS — I25.10 CORONARY ARTERY DISEASE, NON-OCCLUSIVE: ICD-10-CM

## 2024-06-12 DIAGNOSIS — E03.9 ACQUIRED HYPOTHYROIDISM: ICD-10-CM

## 2024-06-12 DIAGNOSIS — R06.02 SHORTNESS OF BREATH: ICD-10-CM

## 2024-06-12 DIAGNOSIS — Z98.61 STATUS POST PERCUTANEOUS TRANSLUMINAL CORONARY ANGIOPLASTY: ICD-10-CM

## 2024-06-12 DIAGNOSIS — I48.0 PAF (PAROXYSMAL ATRIAL FIBRILLATION): ICD-10-CM

## 2024-06-12 DIAGNOSIS — I10 ESSENTIAL HYPERTENSION, BENIGN: ICD-10-CM

## 2024-06-12 DIAGNOSIS — R42 DIZZINESS: ICD-10-CM

## 2024-06-12 DIAGNOSIS — R07.89 CHEST DISCOMFORT: ICD-10-CM

## 2024-06-12 DIAGNOSIS — I10 ESSENTIAL HYPERTENSION: ICD-10-CM

## 2024-06-12 RX ORDER — ASPIRIN 81 MG/1
81 TABLET ORAL DAILY
Qty: 90 TABLET | Refills: 4 | Status: SHIPPED | OUTPATIENT
Start: 2024-06-12

## 2024-06-12 NOTE — PROGRESS NOTES
Encounter Date:06/12/2024  Last seen 12/11/2023      Patient ID: Sol Milton is a 89 y.o. female.        Chief Complaint:  Status post stent  Hypertension  Hypothyroidism        History of Present Illness  She had a fall recently.    Since I have last seen, the patient has been without any chest discomfort ,shortness of breath, palpitations, dizziness or syncope.  Denies having any headache ,abdominal pain ,nausea, vomiting , diarrhea constipation, loss of weight or loss of appetite.  Denies having any excessive bruising ,hematuria or blood in the stool.    Review of all systems negative except as indicated.    Reviewed ROS..  Assessment and Plan         ////////////////////////  History  -------------    status post stent to RCA for totally occluded vessel level in 10/2009.       History of proximal inferior wall hypokinesis with ejection fraction of 55-60%.  60% mid LAD distal to diagonal branch 50-60% mid circumflex 60% ostial marginal and 60% distal circumflex coronary artery disease.     Echocardiogram 8/30/2022-mild mitral regurgitation left atrial enlargement and normal left ventricular function.  Stress Cardiolite test-8/30/2022-normal     Echocardiogram-normal 9/22/2021.  Stress Cardiolite test-normal 9/22/2021      stress Cardiolite test 04/24/2018 showed mild apical infarction without ischemia     Holter monitor 12/5/2023  Basic rhythm is sinus with rates.  Between 53-83/min.  Occasional premature atrial contractions are seen.  Rare and short episodes of SVT were noted.  Occasional premature ventricular contractions are seen with rare couplets.  There were strips with probably baseline artifact and loose lead.  Unlikely asystole.  Patient was lying down.  Some chest pain but awake and able to provide symptoms.  No evidence for AV blocks was seen.  Suggest clinical correlation.  Patient was not having any clinical symptoms while she was wearing the monitor.     -hypothyroidism hypertension GERD and  asthma     -Atrial fibrillation-new onset at last visit.  Patient is in sinus rhythm today 9/22/2021     -allergy to penicillin     -family history of coronary artery disease.  -----------------    Plan  ------------  Dizziness-improved.     Status post stent  Patient is not having any angina pectoris or congestive heart failure.  EKG 12/11/2023 revealed sinus rhythm without ischemic changes.  EKG was not performed today.     History of occasional paroxysmal atrial fibrillation with controlled ventricular response  Patient is maintaining sinus rhythm.     Chronic coronary artery disease-stable  Patient was taken off Plavix and changed to baby aspirin by Dr. Cox primary care physician.     Hypertension-117/73  Continue atenolol amlodipine.     Hypothyroidism-continue levothyroxine     Dyslipidemia-patient is on Zetia    Medications were reviewed and updated.     Follow-up in the office in 6 months.     Further plan will depend on patient's progress.     Reviewed and updated-6/12/2024.  ///////////         Diagnosis Plan   1. Status post angioplasty with stent        2. Essential hypertension        3. Status post percutaneous transluminal coronary angioplasty        4. Essential hypertension, benign        5. Shortness of breath        6. Chest discomfort        7. Acquired hypothyroidism        8. Dizziness        9. Coronary artery disease, non-occlusive        10. PAF (paroxysmal atrial fibrillation)        11. Mixed hyperlipidemia        LAB RESULTS (LAST 7 DAYS)    CBC        BMP        CMP         BNP        TROPONIN        CoAg        Creatinine Clearance  CrCl cannot be calculated (Patient's most recent lab result is older than the maximum 30 days allowed.).    ABG        Radiology  No radiology results for the last day                The following portions of the patient's history were reviewed and updated as appropriate: allergies, current medications, past family history, past medical history, past  social history, past surgical history, and problem list.    Review of Systems   Constitutional: Negative for malaise/fatigue.   Cardiovascular:  Negative for chest pain, leg swelling, palpitations and syncope.   Respiratory:  Negative for shortness of breath.    Skin:  Negative for rash.   Gastrointestinal:  Negative for nausea and vomiting.   Neurological:  Negative for dizziness, light-headedness and numbness.   All other systems reviewed and are negative.        Current Outpatient Medications:     Premarin 0.625 MG/GM vaginal cream, Insert 0.5 g into the vagina 2 (Two) Times a Week., Disp: , Rfl:     raloxifene (EVISTA) 60 MG tablet, Take 1 tablet by mouth Daily., Disp: , Rfl:     Turmeric (QC TUMERIC COMPLEX PO), Take 1 capsule by mouth Daily., Disp: , Rfl:     amLODIPine (NORVASC) 10 MG tablet, Take 1 tablet by mouth Daily., Disp: , Rfl:     atenolol (TENORMIN) 25 MG tablet, Take 1 tablet by mouth Daily., Disp: , Rfl:     Cholecalciferol (VITAMIN D3) 5000 units capsule capsule, Take 1 capsule by mouth Daily., Disp: , Rfl:     coenzyme Q10 100 MG capsule, Take 1 capsule by mouth Daily., Disp: , Rfl:     ezetimibe (ZETIA) 10 MG tablet, Take 1 tablet by mouth Daily., Disp: , Rfl:     HYDROcodone-acetaminophen (NORCO) 5-325 MG per tablet, Take 1 tablet by mouth Every 4 (Four) Hours As Needed for Severe Pain. (Patient not taking: Reported on 6/12/2024), Disp: 50 tablet, Rfl: 0    levothyroxine (SYNTHROID, LEVOTHROID) 88 MCG tablet, Take 1 tablet by mouth Daily., Disp: , Rfl:     Loratadine 10 MG capsule, Take 1 capsule by mouth Daily. (Patient not taking: Reported on 6/12/2024), Disp: , Rfl:     ondansetron (Zofran) 4 MG tablet, Take 1 tablet by mouth Every 6 (Six) Hours As Needed for Nausea or Vomiting. (Patient not taking: Reported on 6/12/2024), Disp: 30 tablet, Rfl: 0    Allergies   Allergen Reactions    Adhesive Tape Itching     While wearing holter monitor itching to site where adhesive is  to skin     "Penicillin G Rash    Prednisone Nausea And Vomiting    Latex Rash       History reviewed. No pertinent family history.    Past Surgical History:   Procedure Laterality Date    CARDIAC SURGERY      EYE SURGERY      QUADRICEPS TENDON REPAIR Right 4/6/2024    Procedure: SUTURE OF QUADRICEPS MUSCLE RUPTURE;  Surgeon: Tesfaye Lucero II, MD;  Location: [facility] OR;  Service: Orthopedics;  Laterality: Right;       Past Medical History:   Diagnosis Date    Disease of thyroid gland     Hyperlipidemia     Hypertension     Myocardial infarction 2009       History reviewed. No pertinent family history.    Social History     Socioeconomic History    Marital status:    Tobacco Use    Smoking status: Never     Passive exposure: Never    Smokeless tobacco: Never   Vaping Use    Vaping status: Never Used   Substance and Sexual Activity    Alcohol use: No    Drug use: Defer    Sexual activity: Defer         Procedures      Objective:       Physical Exam    /73 (BP Location: Right arm, Patient Position: Sitting, Cuff Size: Adult)   Pulse 66   Ht 149.9 cm (59\")   Wt 45.4 kg (100 lb)   SpO2 98% Comment: ra  BMI 20.20 kg/m²   The patient is alert, oriented and in no distress.    Vital signs as noted above.    Head and neck revealed no carotid bruits or jugular venous distension.  No thyromegaly or lymphadenopathy is present.    Lungs clear.  No wheezing.  Breath sounds are normal bilaterally.    Heart normal first and second heart sounds.  No murmur..  No pericardial rub is present.  No gallop is present.    Abdomen soft and nontender.  No organomegaly is present.    Extremities revealed good peripheral pulses without any pedal edema.    Skin warm and dry.    Musculoskeletal system is grossly normal.    CNS grossly normal.    Reviewed and updated.        "

## 2024-08-15 NOTE — DISCHARGE PLACEMENT REQUEST
Problem: Adult Inpatient Plan of Care  Goal: Optimal Comfort and Wellbeing  Outcome: Progressing     Problem: Pneumonia  Goal: Fluid Balance  Outcome: Progressing  Goal: Resolution of Infection Signs and Symptoms  Outcome: Progressing  Goal: Effective Oxygenation and Ventilation  Outcome: Progressing  Intervention: Promote Airway Secretion Clearance  Recent Flowsheet Documentation  Taken 8/15/2024 1400 by Rupesh Loyola RN  Cough And Deep Breathing: done independently per patient     Problem: Adult Inpatient Plan of Care  Goal: Absence of Hospital-Acquired Illness or Injury  Intervention: Prevent and Manage VTE (Venous Thromboembolism) Risk  Recent Flowsheet Documentation  Taken 8/15/2024 1400 by Rupesh Loyola RN  VTE Prevention/Management: SCDs off (sequential compression devices)   Goal Outcome Evaluation:       Patient came up from ED early afternoon. She's A/O x4. VSS. She denies pain. Her appetite is good. Lung sounds are coarse, scratchy with inspiratory wheezes in RL, RM, and RU lobes, as well as LLL. Patient reports her condition feels much improved since this morning.        "Danny Milton (89 y.o. Female)       Date of Birth   1935    Social Security Number       Address   [address] [place] [address]    Home Phone   [phone]    MRN   [id number]       Evangelical   [employer]ist    Marital Status                               Admission Date   4/6/24    Admission Type   Elective    Admitting Provider   Tesfaye Lucero II, MD    Attending Provider   Tesfaye Lucero II, MD    Department, Room/Bed   [facility] SURGICAL INPATIENT, 4114/1       Discharge Date       Discharge Disposition       Discharge Destination                                 Attending Provider: Tesfaye Lucero II, MD    Allergies: Adhesive Tape, Penicillin G, Prednisone, Latex    Isolation: None   Infection: None   Code Status: Not on file    Ht: 149.9 cm (59\")   Wt: 51.7 kg (114 lb)    Admission Cmt: None   Principal Problem: Quadriceps tendon rupture [S76.119A]                   Active Insurance as of 4/6/2024       Primary Coverage       Payor Plan Insurance Group Employer/Plan Group    MEDICARE MEDICARE A & B        Payor Plan Address Payor Plan Phone Number Payor Plan Fax Number Effective Dates    [address] [phone]  2/1/2002 - None Entered    [facility] [id number]         Subscriber Name Subscriber Birth Date Member ID       DANNY MILTON 1935 [id number]               Secondary Coverage       Payor Plan Insurance Group Employer/Plan Group    [employer]        Payor Plan Address Payor Plan Phone Number Payor Plan Fax Number Effective Dates    [address] [phone]  7/3/2019 - None Entered    [facility] [address]         Subscriber Name Subscriber Birth Date Member ID       DANNY MILTON 1935 [id number]                     Emergency Contacts        (Rel.) Home Phone Work Phone Mobile Phone    Zaira Daniels (Daughter) -- -- [phone]    German Milton " (spouse) [phone] -- --

## 2024-09-03 ENCOUNTER — HOSPITAL ENCOUNTER (EMERGENCY)
Facility: HOSPITAL | Age: 89
Discharge: HOME | End: 2024-09-03
Attending: NURSE PRACTITIONER
Payer: MEDICARE

## 2024-09-03 ENCOUNTER — APPOINTMENT (OUTPATIENT)
Dept: GENERAL RADIOLOGY | Facility: HOSPITAL | Age: 89
End: 2024-09-03
Payer: MEDICARE

## 2024-09-03 VITALS
WEIGHT: 100.31 LBS | HEIGHT: 59 IN | SYSTOLIC BLOOD PRESSURE: 170 MMHG | RESPIRATION RATE: 18 BRPM | TEMPERATURE: 97.5 F | BODY MASS INDEX: 20.22 KG/M2 | HEART RATE: 65 BPM | OXYGEN SATURATION: 98 % | DIASTOLIC BLOOD PRESSURE: 84 MMHG
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
## 2024-09-03 DIAGNOSIS — R79.89 ELEVATED TSH: ICD-10-CM

## 2024-09-03 DIAGNOSIS — S40.011A CONTUSION OF MULTIPLE SITES OF RIGHT SHOULDER, INITIAL ENCOUNTER: ICD-10-CM

## 2024-09-03 DIAGNOSIS — S16.1XXA STRAIN OF NECK MUSCLE, INITIAL ENCOUNTER: ICD-10-CM

## 2024-09-03 DIAGNOSIS — S70.01XA CONTUSION OF RIGHT HIP, INITIAL ENCOUNTER: ICD-10-CM

## 2024-09-03 DIAGNOSIS — M62.81 GENERALIZED MUSCLE WEAKNESS: Primary | ICD-10-CM

## 2024-09-03 DIAGNOSIS — M25.571 ACUTE RIGHT ANKLE PAIN: ICD-10-CM

## 2024-09-03 DIAGNOSIS — S80.01XA CONTUSION OF RIGHT KNEE, INITIAL ENCOUNTER: ICD-10-CM

## 2024-09-03 DIAGNOSIS — Z91.148 NONCOMPLIANCE WITH MEDICATIONS: ICD-10-CM

## 2024-09-03 LAB
ALBUMIN SERPL-MCNC: 3.9 G/DL (ref 3.5–5.2)
ALBUMIN/GLOB SERPL: 1.5 G/DL
ALP SERPL-CCNC: 43 U/L (ref 39–117)
ALT SERPL W P-5'-P-CCNC: 24 U/L (ref 1–33)
ANION GAP SERPL CALCULATED.3IONS-SCNC: 13 MMOL/L (ref 5–15)
AST SERPL-CCNC: 46 U/L (ref 1–32)
BASOPHILS # BLD AUTO: 0.01 10*3/MM3 (ref 0–0.2)
BASOPHILS NFR BLD AUTO: 0.1 % (ref 0–1.5)
BILIRUB SERPL-MCNC: 0.2 MG/DL (ref 0–1.2)
BUN SERPL-MCNC: 17 MG/DL (ref 8–23)
BUN/CREAT SERPL: 19.1 (ref 7–25)
CALCIUM SPEC-SCNC: 9.1 MG/DL (ref 8.6–10.5)
CHLORIDE SERPL-SCNC: 103 MMOL/L (ref 98–107)
CK SERPL-CCNC: 86 U/L (ref 20–180)
CO2 SERPL-SCNC: 22 MMOL/L (ref 22–29)
CREAT SERPL-MCNC: 0.89 MG/DL (ref 0.57–1)
DEPRECATED RDW RBC AUTO: 49.6 FL (ref 37–54)
EGFRCR SERPLBLD CKD-EPI 2021: 62.8 ML/MIN/1.73
EOSINOPHIL # BLD AUTO: 0.19 10*3/MM3 (ref 0–0.4)
EOSINOPHIL NFR BLD AUTO: 2.8 % (ref 0.3–6.2)
ERYTHROCYTE [DISTWIDTH] IN BLOOD BY AUTOMATED COUNT: 14.1 % (ref 12.3–15.4)
GLOBULIN UR ELPH-MCNC: 2.6 GM/DL
GLUCOSE SERPL-MCNC: 105 MG/DL (ref 65–99)
HCT VFR BLD AUTO: 40.6 % (ref 34–46.6)
HGB BLD-MCNC: 13.4 G/DL (ref 12–15.9)
IMM GRANULOCYTES # BLD AUTO: 0.03 10*3/MM3 (ref 0–0.05)
IMM GRANULOCYTES NFR BLD AUTO: 0.4 % (ref 0–0.5)
LYMPHOCYTES # BLD AUTO: 0.89 10*3/MM3 (ref 0.7–3.1)
LYMPHOCYTES NFR BLD AUTO: 13 % (ref 19.6–45.3)
MAGNESIUM SERPL-MCNC: 2.3 MG/DL (ref 1.6–2.4)
MCH RBC QN AUTO: 31.6 PG (ref 26.6–33)
MCHC RBC AUTO-ENTMCNC: 33 G/DL (ref 31.5–35.7)
MCV RBC AUTO: 95.8 FL (ref 79–97)
MONOCYTES # BLD AUTO: 0.48 10*3/MM3 (ref 0.1–0.9)
MONOCYTES NFR BLD AUTO: 7 % (ref 5–12)
NEUTROPHILS NFR BLD AUTO: 5.22 10*3/MM3 (ref 1.7–7)
NEUTROPHILS NFR BLD AUTO: 76.7 % (ref 42.7–76)
NRBC BLD AUTO-RTO: 0 /100 WBC (ref 0–0.2)
PLATELET # BLD AUTO: 192 10*3/MM3 (ref 140–450)
PMV BLD AUTO: 11.3 FL (ref 6–12)
POTASSIUM SERPL-SCNC: 4.5 MMOL/L (ref 3.5–5.2)
PROT SERPL-MCNC: 6.5 G/DL (ref 6–8.5)
RBC # BLD AUTO: 4.24 10*6/MM3 (ref 3.77–5.28)
SODIUM SERPL-SCNC: 138 MMOL/L (ref 136–145)
TSH SERPL DL<=0.05 MIU/L-ACNC: 9.54 UIU/ML (ref 0.27–4.2)
WBC NRBC COR # BLD AUTO: 6.82 10*3/MM3 (ref 3.4–10.8)

## 2024-09-03 PROCEDURE — 83735 ASSAY OF MAGNESIUM: CPT | Performed by: NURSE PRACTITIONER

## 2024-09-03 PROCEDURE — 80053 COMPREHEN METABOLIC PANEL: CPT | Performed by: NURSE PRACTITIONER

## 2024-09-03 PROCEDURE — 97161 PT EVAL LOW COMPLEX 20 MIN: CPT | Performed by: PHYSICAL THERAPIST

## 2024-09-03 PROCEDURE — 72050 X-RAY EXAM NECK SPINE 4/5VWS: CPT

## 2024-09-03 PROCEDURE — 73502 X-RAY EXAM HIP UNI 2-3 VIEWS: CPT

## 2024-09-03 PROCEDURE — 84443 ASSAY THYROID STIM HORMONE: CPT | Performed by: NURSE PRACTITIONER

## 2024-09-03 PROCEDURE — 73030 X-RAY EXAM OF SHOULDER: CPT

## 2024-09-03 PROCEDURE — 73562 X-RAY EXAM OF KNEE 3: CPT

## 2024-09-03 PROCEDURE — 85025 COMPLETE CBC W/AUTO DIFF WBC: CPT | Performed by: NURSE PRACTITIONER

## 2024-09-03 PROCEDURE — 36415 COLL VENOUS BLD VENIPUNCTURE: CPT

## 2024-09-03 PROCEDURE — 73610 X-RAY EXAM OF ANKLE: CPT

## 2024-09-03 PROCEDURE — 82550 ASSAY OF CK (CPK): CPT | Performed by: NURSE PRACTITIONER

## 2024-09-03 PROCEDURE — 99283 EMERGENCY DEPT VISIT LOW MDM: CPT

## 2024-09-03 RX ORDER — SODIUM CHLORIDE 0.9 % (FLUSH) 0.9 %
10 SYRINGE (ML) INJECTION AS NEEDED
Status: DISCONTINUED | OUTPATIENT
Start: 2024-09-03 | End: 2024-09-03 | Stop reason: HOSPADM

## 2024-09-03 NOTE — THERAPY EVALUATION
Patient Name: Sol Milton  : 1935    MRN: [id number]                              Today's Date: 9/3/2024       Admit Date: 9/3/2024    Visit Dx: No diagnosis found.  Patient Active Problem List   Diagnosis    Acute bronchitis with bronchospasm    Asthma    Bronchospasm    Cat scratch    Gastroesophageal reflux disease    History of left heart catheterization (LHC)    Hyperlipidemia    Hypertension    Hypothyroidism    Osteoporosis    Status post percutaneous transluminal coronary angioplasty    Quadriceps tendon rupture     Past Medical History:   Diagnosis Date    Disease of thyroid gland     Hyperlipidemia     Hypertension     Myocardial infarction      Past Surgical History:   Procedure Laterality Date    CARDIAC SURGERY      EYE SURGERY      QUADRICEPS TENDON REPAIR Right 2024    Procedure: SUTURE OF QUADRICEPS MUSCLE RUPTURE;  Surgeon: Tesfaye Lucero II, MD;  Location: [facility] OR;  Service: Orthopedics;  Laterality: Right;      General Information       Row Name 24 1551          Physical Therapy Time and Intention    Document Type evaluation  -AD     Mode of Treatment physical therapy  -AD       Row Name 24 1551          General Information    Patient Profile Reviewed yes  -AD     Prior Level of Function independent:;bed mobility;ADL's;all household mobility;community mobility  prior to knee surgery - since then household ambulation level and cane / RW  -AD     Existing Precautions/Restrictions fall  -AD     Barriers to Rehab none identified  -AD       Row Name 24 1551          Living Environment    People in Home spouse  -AD       Row Name 24 1551          Home Main Entrance    Number of Stairs, Main Entrance seven  -AD     Stair Railings, Main Entrance railings safe and in good condition  -AD       Row Name 24 1551          Stairs Within Home, Primary    Number of Stairs, Within Home, Primary twelve  -AD       Row Name 24 1551          Safety  Issues, Functional Mobility    Impairments Affecting Function (Mobility) balance;endurance/activity tolerance;pain;strength;range of motion (ROM)  -AD               User Key  (r) = Recorded By, (t) = Taken By, (c) = Cosigned By      Initials Name Provider Type    Pilar Garcia PT Physical Therapist                   Mobility       Row Name 09/03/24 1552          Bed Mobility    Bed Mobility bed mobility (all) activities  -AD     All Activities, [place] (Bed Mobility) minimum assist (75% patient effort)  -AD       Row Name 09/03/24 1552          Bed-Chair Transfer    Bed-Chair [place] (Transfers) contact guard  -AD     Assistive Device (Bed-Chair Transfers) walker, front-wheeled  -AD       Row Name 09/03/24 1552          Sit-Stand Transfer    Sit-Stand [place] (Transfers) contact guard  -AD     Assistive Device (Sit-Stand Transfers) walker, front-wheeled  -AD       Row Name 09/03/24 1552          Gait/Stairs (Locomotion)    [place] Level (Gait) contact guard  -AD     Assistive Device (Gait) walker, front-wheeled  -AD     Patient was able to Ambulate yes  -AD     Distance in Feet (Gait) 10  -AD               User Key  (r) = Recorded By, (t) = Taken By, (c) = Cosigned By      Initials Name Provider Type    Pilar Garcia PT Physical Therapist                   Obj/Interventions       Row Name 09/03/24 1553          Range of Motion Comprehensive    Comment, General Range of Motion 50% dec BLE  -AD       Row Name 09/03/24 1553          Strength Comprehensive (MMT)    Comment, General Manual Muscle Testing (MMT) Assessment (R) LE 3+/5 knee ext, LLE 4-/5  -AD       Row Name 09/03/24 1553          Balance    Balance Assessment sitting static balance  -AD     Static Sitting Balance independent  -AD               User Key  (r) = Recorded By, (t) = Taken By, (c) = Cosigned By      Initials Name Provider Type    Pilar Garcia PT Physical Therapist                   Goals/Plan       Row  Name 09/03/24 1600          Bed Mobility Goal 1 (PT)    Activity/Assistive Device (Bed Mobility Goal 1, PT) bed mobility activities, all  -AD     [place] Level/Cues Needed (Bed Mobility Goal 1, PT) independent  -AD     Time Frame (Bed Mobility Goal 1, PT) long term goal (LTG)  -AD       Row Name 09/03/24 1600          Transfer Goal 1 (PT)    Activity/Assistive Device (Transfer Goal 1, PT) transfers, all  -AD     [place] Level/Cues Needed (Transfer Goal 1, PT) independent  -AD     Time Frame (Transfer Goal 1, PT) long term goal (LTG)  -AD       Row Name 09/03/24 1600          Gait Training Goal 1 (PT)    Activity/Assistive Device (Gait Training Goal 1, PT) gait (walking locomotion);assistive device use  -AD     [place] Level (Gait Training Goal 1, PT) modified independence  -AD     Distance (Gait Training Goal 1, PT) 50  -AD     Time Frame (Gait Training Goal 1, PT) long term goal (LTG)  -AD       Row Name 09/03/24 1600          Therapy Assessment/Plan (PT)    Planned Therapy Interventions (PT) balance training;bed mobility training;gait training;postural re-education;patient/family education;neuromuscular re-education;ROM (range of motion);strengthening;stretching;transfer training  -AD               User Key  (r) = Recorded By, (t) = Taken By, (c) = Cosigned By      Initials Name Provider Type    AD Pilar Masterson, PT Physical Therapist                   Clinical Impression       Row Name 09/03/24 1059          Pain    Pretreatment Pain Rating 5/10  -AD     Posttreatment Pain Rating 5/10  -AD     Pain Location - neck;ankle;knee;shoulder  -AD     Pain Intervention(s) Repositioned  -AD       Row Name 09/03/24 4283          Plan of Care Review    Plan of Care Reviewed With patient  -AD     Progress no change  -AD     Outcome Evaluation Pt is an 89 yr/o female presenting to ED post fall today at home while ambulating with cane, reporting her right knee froze and that caused her to fall. She lives with  her  in a 2 story home with 7 steps to enter and 12 to go to basement. She has a cane and RW that she uses regularly, and had a recent quad tendon repair on (R)LE recently and did rehab, now participating in HHPT and doing well before todays fall. Prior function was (I) with all ADLs and community mobility, after quad tendon repair she was mod(I) with cane for household ambulation. Today she required CGA- Rhina for bed mobility, CGA for sit<>Stand and ambulation with RW in the room for 10 feet. Recomment inpatient acute rehab at discharge as pt is below her baseline function and her prior level of function was high. Pt in agreement.  -AD       Row Name 09/03/24 1552          Therapy Assessment/Plan (PT)    Patient/Family Therapy Goals Statement (PT) get better  -AD     Criteria for Skilled Interventions Met (PT) yes;skilled treatment is necessary  -AD     Therapy Frequency (PT) 5 times/wk  -AD               User Key  (r) = Recorded By, (t) = Taken By, (c) = Cosigned By      Initials Name Provider Type    AD Pilar Masterson, PT Physical Therapist                   Outcome Measures    No documentation.                                Physical Therapy Education       Title: PT OT SLP Therapies (In Progress)       Topic: Physical Therapy (In Progress)       Point: Mobility training (Done)       Learning Progress Summary             Patient Acceptance, E, VU by AD at 9/3/2024 1600                         Point: Home exercise program (Not Started)       Learner Progress:  Not documented in this visit.              Point: Body mechanics (Not Started)       Learner Progress:  Not documented in this visit.              Point: Precautions (Not Started)       Learner Progress:  Not documented in this visit.                              User Key       Initials Effective Dates Name Provider Type Discipline    AD 06/03/24 -  Pilar Masterson PT Physical Therapist PT                  PT Recommendation and Plan  Planned  Therapy Interventions (PT): balance training, bed mobility training, gait training, postural re-education, patient/family education, neuromuscular re-education, ROM (range of motion), strengthening, stretching, transfer training  Plan of Care Reviewed With: patient  Progress: no change  Outcome Evaluation: Pt is an 89 yr/o female presenting to ED post fall today at home while ambulating with cane, reporting her right knee froze and that caused her to fall. She lives with her  in a 2 story home with 7 steps to enter and 12 to go to basement. She has a cane and RW that she uses regularly, and had a recent quad tendon repair on (R)LE recently and did rehab, now participating in HHPT and doing well before todays fall. Prior function was (I) with all ADLs and community mobility, after quad tendon repair she was mod(I) with cane for household ambulation. Today she required CGA- Rhina for bed mobility, CGA for sit<>Stand and ambulation with RW in the room for 10 feet. Recomment inpatient acute rehab at discharge as pt is below her baseline function and her prior level of function was high. Pt in agreement.     Time Calculation:   PT Evaluation Complexity  History, PT Evaluation Complexity: 1-2 personal factors and/or comorbidities  Examination of Body Systems (PT Eval Complexity): 1-2 elements  Clinical Presentation (PT Evaluation Complexity): stable  Clinical Decision Making (PT Evaluation Complexity): low complexity  Overall Complexity (PT Evaluation Complexity): low complexity     PT Charges       Row Name 09/03/24 1550             Time Calculation    Start Time 1406  -AD      Stop Time 1438  -AD      Time Calculation (min) 32 min  -AD      PT Received On 09/03/24  -AD         Time Calculation- PT    Total Timed Code Minutes- PT 0 minute(s)  -AD                User Key  (r) = Recorded By, (t) = Taken By, (c) = Cosigned By      Initials Name Provider Type    Pilar Garcia, PT Physical Therapist                   Therapy Charges for Today       Code Description Service Date Service Provider Modifiers Qty    77481908296 HC PT EVAL LOW COMPLEXITY 4 9/3/2024 Pilar Masterson, PT GP 1               PT Discharge Summary  Anticipated Discharge Disposition (PT): inpatient rehabilitation facility    Pilar Masterson, PT  9/3/2024

## 2024-09-03 NOTE — ED PROVIDER NOTES
Subjective   History of Present Illness  89-year-old female presents with multiple complaints status post fall: Right lateral neck, right glenohumeral shoulder, right ischial hip, suprapatellar, and bilateral malleoli are pain status post fall.  She denies head injury.  Denies taking blood thinners.   at bedside reports that she had a controlled fall yesterday as well.  He attributes her neck pain to that fall.    History provided by:  Patient      Review of Systems    Past Medical History:   Diagnosis Date    Disease of thyroid gland     Hyperlipidemia     Hypertension     Myocardial infarction 2009       Allergies   Allergen Reactions    Adhesive Tape Itching     While wearing holter monitor itching to site where adhesive is  to skin    Penicillin G Rash    Prednisone Nausea And Vomiting    Latex Rash       Past Surgical History:   Procedure Laterality Date    CARDIAC SURGERY      EYE SURGERY      QUADRICEPS TENDON REPAIR Right 4/6/2024    Procedure: SUTURE OF QUADRICEPS MUSCLE RUPTURE;  Surgeon: Tesfaye Lucero II, MD;  Location: [facility] OR;  Service: Orthopedics;  Laterality: Right;       No family history on file.    Social History     Socioeconomic History    Marital status:    Tobacco Use    Smoking status: Never     Passive exposure: Never    Smokeless tobacco: Never   Vaping Use    Vaping status: Never Used   Substance and Sexual Activity    Alcohol use: No    Drug use: Defer    Sexual activity: Defer           Objective   Physical Exam  Vitals and nursing note reviewed.   Constitutional:       General: She is awake. She is not in acute distress.     Appearance: Normal appearance. She is well-developed and underweight.   HENT:      Head: Normocephalic and atraumatic.      Right Ear: External ear normal.      Left Ear: External ear normal.      Nose: Nose normal. No rhinorrhea.      Mouth/Throat:      Mouth: Mucous membranes are moist.      Pharynx: Oropharynx is clear.    Eyes:      Extraocular Movements: Extraocular movements intact.      Conjunctiva/sclera: Conjunctivae normal.      Pupils: Pupils are equal, round, and reactive to light.   Cardiovascular:      Pulses: Normal pulses.           Radial pulses are 2+ on the right side and 2+ on the left side.        Dorsalis pedis pulses are 2+ on the right side and 2+ on the left side.        Posterior tibial pulses are 2+ on the right side and 2+ on the left side.   Musculoskeletal:         General: Tenderness and signs of injury present. No swelling or deformity. Normal range of motion.      Right shoulder: Tenderness and bony tenderness present. No swelling or crepitus. Decreased range of motion. Normal strength. Normal pulse.      Left shoulder: Normal.        Arms:       Cervical back: Normal range of motion and neck supple. Tenderness present.      Right hip: Tenderness and bony tenderness present. No deformity or crepitus. Decreased range of motion. Normal strength.      Left hip: Normal.      Right knee: Swelling and bony tenderness present. Decreased range of motion. Tenderness present. Abnormal patellar mobility. Normal pulse.      Left knee: Normal.      Right ankle: No swelling or deformity. Tenderness present over the lateral malleolus and medial malleolus. Normal range of motion. Normal pulse.      Right Achilles Tendon: Normal.      Left ankle: Normal.        Legs:         Feet:    Skin:     General: Skin is warm and dry.      Capillary Refill: Capillary refill takes less than 2 seconds.      Coloration: Skin is not pale.   Neurological:      Mental Status: She is alert and oriented to person, place, and time.      Sensory: No sensory deficit.      Motor: No abnormal muscle tone.   Psychiatric:         Behavior: Behavior is cooperative.         Procedures           ED Course  ED Course as of 09/04/24 0844   Tue Sep 03, 2024   4960 Awaiting x-ray results. [AL]      ED Course User Index  [AL] Lisa العراقي, APRN                                              Medical Decision Making  Problems Addressed:  Elevated TSH: complicated acute illness or injury  Fall, initial encounter: complicated acute illness or injury  Generalized muscle weakness: complicated acute illness or injury  Noncompliance with medications: complicated acute illness or injury    Amount and/or Complexity of Data Reviewed  Labs: ordered.  Radiology: ordered.    Interpreted by radiologist as below:     XR Shoulder 2+ View Right    Result Date: 9/3/2024  Impression: 1. Osteopenia somewhat limits evaluation of the exams. 2. Right shoulder demonstrates degenerative changes without evidence of acute fracture or dislocation. 3. Pelvis and right hip demonstrates degenerative change with no definite acute fracture or dislocation. 4. Foreign body projects at the base of the pelvis. Findings may represent a prosthesis on or within the soft tissues. Please correlate with history and clinical exam 5. Right knee demonstrates degenerative change with no definite acute osseous abnormality. Mild soft tissue swelling prepatellar soft tissues 6. Right ankle demonstrates no obvious acute fracture or dislocation Electronically Signed: Thad Staton MD  9/3/2024 1:48 PM EDT  Workstation ID: [id number]    XR Hip With or Without Pelvis 2 - 3 View Right    Result Date: 9/3/2024  Impression: 1. Osteopenia somewhat limits evaluation of the exams. 2. Right shoulder demonstrates degenerative changes without evidence of acute fracture or dislocation. 3. Pelvis and right hip demonstrates degenerative change with no definite acute fracture or dislocation. 4. Foreign body projects at the base of the pelvis. Findings may represent a prosthesis on or within the soft tissues. Please correlate with history and clinical exam 5. Right knee demonstrates degenerative change with no definite acute osseous abnormality. Mild soft tissue swelling prepatellar soft tissues 6. Right ankle demonstrates no  obvious acute fracture or dislocation Electronically Signed: Thad Staton MD  9/3/2024 1:48 PM EDT  Workstation ID: [id number]    XR Knee 3 View Right    Result Date: 9/3/2024  Impression: 1. Osteopenia somewhat limits evaluation of the exams. 2. Right shoulder demonstrates degenerative changes without evidence of acute fracture or dislocation. 3. Pelvis and right hip demonstrates degenerative change with no definite acute fracture or dislocation. 4. Foreign body projects at the base of the pelvis. Findings may represent a prosthesis on or within the soft tissues. Please correlate with history and clinical exam 5. Right knee demonstrates degenerative change with no definite acute osseous abnormality. Mild soft tissue swelling prepatellar soft tissues 6. Right ankle demonstrates no obvious acute fracture or dislocation Electronically Signed: Thad Staton MD  9/3/2024 1:48 PM EDT  Workstation ID: [id number]    XR Ankle 3+ View Right    Result Date: 9/3/2024  Impression: 1. Osteopenia somewhat limits evaluation of the exams. 2. Right shoulder demonstrates degenerative changes without evidence of acute fracture or dislocation. 3. Pelvis and right hip demonstrates degenerative change with no definite acute fracture or dislocation. 4. Foreign body projects at the base of the pelvis. Findings may represent a prosthesis on or within the soft tissues. Please correlate with history and clinical exam 5. Right knee demonstrates degenerative change with no definite acute osseous abnormality. Mild soft tissue swelling prepatellar soft tissues 6. Right ankle demonstrates no obvious acute fracture or dislocation Electronically Signed: Thad Staton MD  9/3/2024 1:48 PM EDT  Workstation ID: [id number]    XR Spine Cervical Complete 4 or 5 View    Result Date: 9/3/2024  Impression: Chronic findings. No acute process. Electronically Signed: Mleanie Diaz MD  9/3/2024 1:36 PM EDT  Workstation ID: [id number]       /84   Pulse  "65   Temp 97.5 °F (36.4 °C)   Resp 18   Ht 149.9 cm (59\")   Wt 45.5 kg (100 lb 5 oz)   SpO2 98%   BMI 20.26 kg/m²      Lab Results (last 24 hours)       Procedure Component Value Units Date/Time    CBC & Differential [[id number]]  (Abnormal) Collected: 09/03/24 1528    Specimen: Blood from Arm, Right Updated: 09/03/24 1539    Narrative:      The following orders were created for panel order CBC & Differential.  Procedure                               Abnormality         Status                     ---------                               -----------         ------                     CBC Auto Differential[[id number]]        Abnormal            Final result                 Please view results for these tests on the individual orders.    Comprehensive Metabolic Panel [[id number]]  (Abnormal) Collected: 09/03/24 1528    Specimen: Blood from Arm, Right Updated: 09/03/24 1614     Glucose 105 mg/dL      BUN 17 mg/dL      Creatinine 0.89 mg/dL      Sodium 138 mmol/L      Potassium 4.5 mmol/L      Comment: Slight hemolysis detected by analyzer. Result may be falsely elevated.        Chloride 103 mmol/L      CO2 22.0 mmol/L      Calcium 9.1 mg/dL      Total Protein 6.5 g/dL      Albumin 3.9 g/dL      ALT (SGPT) 24 U/L      AST (SGOT) 46 U/L      Comment: Slight hemolysis detected by analyzer. Result may be falsely elevated.        Alkaline Phosphatase 43 U/L      Total Bilirubin 0.2 mg/dL      Globulin 2.6 gm/dL      A/G Ratio 1.5 g/dL      BUN/Creatinine Ratio 19.1     Anion Gap 13.0 mmol/L      eGFR 62.8 mL/min/1.73     Narrative:      GFR Normal >60  Chronic Kidney Disease <60  Kidney Failure <15    The GFR formula is only valid for adults with stable renal function between ages 18 and 70.    Magnesium [[id number]]  (Normal) Collected: 09/03/24 1528    Specimen: Blood from Arm, Right Updated: 09/03/24 1613     Magnesium 2.3 mg/dL     CK [[id number]]  (Normal) Collected: 09/03/24 1528    Specimen: Blood from Arm, Right " Updated: 09/03/24 1609     Creatine Kinase 86 U/L     TSH [[id number]]  (Abnormal) Collected: 09/03/24 1528    Specimen: Blood from Arm, Right Updated: 09/03/24 1609     TSH 9.540 uIU/mL     CBC Auto Differential [[id number]]  (Abnormal) Collected: 09/03/24 1528    Specimen: Blood from Arm, Right Updated: 09/03/24 1539     WBC 6.82 10*3/mm3      RBC 4.24 10*6/mm3      Hemoglobin 13.4 g/dL      Hematocrit 40.6 %      MCV 95.8 fL      MCH 31.6 pg      MCHC 33.0 g/dL      RDW 14.1 %      RDW-SD 49.6 fl      MPV 11.3 fL      Platelets 192 10*3/mm3      Neutrophil % 76.7 %      Lymphocyte % 13.0 %      Monocyte % 7.0 %      Eosinophil % 2.8 %      Basophil % 0.1 %      Immature Grans % 0.4 %      Neutrophils, Absolute 5.22 10*3/mm3      Lymphocytes, Absolute 0.89 10*3/mm3      Monocytes, Absolute 0.48 10*3/mm3      Eosinophils, Absolute 0.19 10*3/mm3      Basophils, Absolute 0.01 10*3/mm3      Immature Grans, Absolute 0.03 10*3/mm3      nRBC 0.0 /100 WBC              Medications - No data to display     Appropriate PPE worn during patient exam.  Appropriate monitoring initiated. Patient is alert and oriented x3.  No acute distress noted.  Patient has noted to have right lateral neck musculature pain.  She has full range of motion.  No bony step-off noted on palpation.  She also has right glenohumeral pain.  She has decreased range of motion specifically abduction.  She is tender on her right ischium.  There is no overlying bruising.  She has independent range of motion.  She is tender at the suprapatellar region overlying her scar from previous repair of quadricep tendon rupture.  She is able to independently flex the knee without difficulty.  It is difficult to tell if there is deformity at the insertion of the quadricep tendon as this is the cadaver.  She also reports bimalleolar ankle pain.  There is no significant laxity if she has independent range of motion.  Of both upper and lower extremities motor function  sensation intact.  Distal pulses strong equal bilaterally 2+.  Cap refill less than 2 seconds.  IV established and labs obtained.  CBC CMP essentially unremarkable.  CK magnesium within normal limits.  TSH was slightly elevated 9.5, patient reports she has been out of her Synthroid.  Consulted both PT and case management.  Patient is very weak upon ambulation and inpatient rehab was suggested by PT.  [facility] was consulted and has a bed available for patient.  Patient's  is going to take her.    I reviewed chart 6/12/2024 patient was seen by cardiology for follow-up on angioplasty with stents. My radiology interpretation cervical spine was significant for degenerative changes and spurring, no acute fractures. Shoulder, hip, knee, ankle: Osteopenia throughout.  Right shoulder shows degenerative changes without fracture or dislocation.  Pelvis and right hip shows no acute fractures dislocations.  Right knee shows mild soft tissue swelling without fracture or dislocation.  Right ankle shows no acute fracture dislocation soft tissue swelling.  Differential Diagnoses, not all-inclusive and does not constitute entirety of all causes: Hypomagnesemia, cervical strain, shoulder dislocation, hip fracture dislocation.  Hypomagnesemia ruled out by labs.  Cervical strain determined by exam.  Dislocation and fractures ruled out by imaging.    Disposition/Treatment: Discussed results with patient, verbalized understanding. Discussed reasons to return, patient verbalized understanding. Agreeable with plan of care. Patient was stable upon discharge.    Upon reassessment, patient is flesh tone warm and dry no acute distress noted.  Vital signs are stable.    Part of this note may be an electronic transcription/translation of spoken language to printed text using the Dragon Dictation System.   Final diagnoses:   Generalized muscle weakness   Fall, initial encounter   Elevated TSH   Noncompliance with medications   Strain of  neck muscle, initial encounter   Contusion of multiple sites of right shoulder, initial encounter   Contusion of right hip, initial encounter   Contusion of right knee, initial encounter   Acute right ankle pain       ED Disposition  ED Disposition       ED Disposition   Discharge    Condition   Stable    Comment   --               Leydi Cox MD  [address] [address] [phone]    Schedule an appointment as soon as possible for a visit       [facility] EMERGENCY DEPARTMENT  [facility] [address] [phone]  Go to   If symptoms worsen         Medication List      No changes were made to your prescriptions during this visit.

## 2024-09-03 NOTE — PLAN OF CARE
Goal Outcome Evaluation:  Plan of Care Reviewed With: patient        Progress: no change  Outcome Evaluation: Pt is an 89 yr/o female presenting to ED post fall today at home while ambulating with cane, reporting her right knee froze and that caused her to fall. She lives with her  in a 2 story home with 7 steps to enter and 12 to go to basement. She has a cane and RW that she uses regularly, and had a recent quad tendon repair on (R)LE recently and did rehab, now participating in HHPT and doing well before todays fall. Prior function was (I) with all ADLs and community mobility, after quad tendon repair she was mod(I) with cane for household ambulation. Today she required CGA- Rhina for bed mobility, CGA for sit<>Stand and ambulation with RW in the room for 10 feet. Recomment inpatient acute rehab at discharge as pt is below her baseline function and her prior level of function was high. Pt in agreement.      Anticipated Discharge Disposition (PT): inpatient rehabilitation facility

## 2024-09-03 NOTE — DISCHARGE PLACEMENT REQUEST
"Danny Milton (89 y.o. 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    Marital Status                               Admission Date   9/3/24    Admission Type   Emergency    Admitting Provider       Attending Provider   Lisa العراقي APRN    Department, Room/Bed   [facility] EMERGENCY DEPARTMENT, 34/34       Discharge Date       Discharge Disposition       Discharge Destination                                 Attending Provider: Lisa العراقي APRN    Allergies: Adhesive Tape, Penicillin G, Prednisone, Latex    Isolation: None   Infection: None   Code Status: Not on file    Ht: 149.9 cm (59\")   Wt: 45.5 kg (100 lb 5 oz)    Admission Cmt: None   Principal Problem: None                  Active Insurance as of 9/3/2024       Primary Coverage       Payor Plan Insurance Group Employer/Plan Group    MEDICARE MEDICARE A & B        Payor Plan Address Payor Plan Phone Number Payor Plan Fax Number Effective Dates    [address] [phone]  2/1/2002 - None Entered    [facility] [id number]         Subscriber Name Subscriber Birth Date Member ID       DANNY MILTON 1935 [id number]               Secondary Coverage       Payor Plan Insurance Group Employer/Plan Group    [employer]        Payor Plan Address Payor Plan Phone Number Payor Plan Fax Number Effective Dates    [address] [phone]  7/3/2019 - None Entered    [facility] [address]         Subscriber Name Subscriber Birth Date Member ID       DANNY MILTON 1935 [id number]                     Emergency Contacts        (Rel.) Home Phone Work Phone Mobile Phone    Zaira Milton (Daughter) -- -- [phone]    German Milton (Spouse) [phone] -- --                "

## 2024-09-03 NOTE — CASE MANAGEMENT/SOCIAL WORK
Continued Stay Note   Curtis     Patient Name: Sol Milton  MRN: [id number]  Today's Date: 9/3/2024    Admit Date: 9/3/2024    Plan: Accepted at [facility]. Bed available at 6pm; Spouse to transport   Discharge Plan       Row Name 09/03/24 1647       Plan    Plan Accepted at [facility]. Bed available at 6pm; Spouse to transport    Plan Comments Notified by Guerita with [facility] rehab that they can accept her, but bed is not available until 6pm tonight. Pt and spouse aware. Spouse plans to provide transportation for patient,but will need staff assistance for patient in and out of vehicle. Pt's nurse Azalea informed. Geraldiney with [facility] states staff there can assist pt out of vehicle when she arrives. Pt and spouse deny additional dc needs at this time.      Row Name 09/03/24 0780       Plan    Plan Home with spouse and home health vs [facility]    Patient/Family in Agreement with Plan yes    Plan Comments Notified by NP Lisa العراقي stating patient may need rehab at discharge.   spoke with patient and spouse German. They live together and patient is AADLs. PCP confirmed. Pt states she uses [facility] for PT. Per PT Elbert, pt would benefit from inpatient rehab. Discussed with her and spouse. They would like referral to [facility]. Notified liaison Guerita and referral sent in [facility]. Await evaluation and determination. Spouse states he can provide transportation at [place]. If unable to be accepted at [facility],pt and spouse anticipate return home with same services.                   Discharge Codes    No documentation.                   Roxi Chamberlain RN, [facility]  Office: [phone]  Fax: [phone]  [email or web address]      I met with patient in room wearing PPE: mask    Maintained distance greater than six feet and spent </=15 minutes in the room     Roxi Chamberlain RN

## 2024-09-03 NOTE — DISCHARGE INSTRUCTIONS
Go to [facility] re[facility] as arranged.  Schedule follow-up with primary care for recheck.  Go to the ER for new or worsening symptoms

## 2024-09-03 NOTE — CASE MANAGEMENT/SOCIAL WORK
Discharge Planning Assessment   Curtis     Patient Name: Sol Milton  MRN: [id number]  Today's Date: 9/3/2024    Admit Date: 9/3/2024    Plan: Home with spouse and home health vs [facility]   Discharge Needs Assessment    No documentation.                  Discharge Plan       Row Name 09/03/24 1550       Plan    Plan Home with spouse and home health vs [facility]    Patient/Family in Agreement with Plan yes    Plan Comments Notified by NP Lisa العراقي stating patient may need rehab at discharge.   spoke with patient and spouse German. They live together and patient is AADLs. PCP confirmed. Pt states she uses [facility] for PT. Per PT Elbert, pt would benefit from inpatient rehab. Discussed with her and spouse. They would like referral to hospitals. Notified liaison Guerita and referral sent in [facility]. Await evaluation and determination. Spouse states he can provide transportation at [place]. If unable to be accepted at hospitals,pt and spouse anticipate return home with same services.                  Continued Care and Services - Admitted Since 9/3/2024       Destination       Service Provider Request Status Selected Services Address Phone Fax Patient Preferred    Critical access hospital [facility] Pending - Request Sent N/A [address] [facility] [place] [id number] [phone] --                  Roxi Chamberlain RN, [facility]  Office: [phone]  Fax: [phone]  [email or web address]      I met with patient in room wearing PPE: mask    Maintained distance greater than six feet and spent </=15 minutes in the room      Roxi Chamberlain RN

## 2024-10-15 ENCOUNTER — APPOINTMENT (OUTPATIENT)
Dept: GENERAL RADIOLOGY | Facility: HOSPITAL | Age: 89
End: 2024-10-15
Payer: MEDICARE

## 2024-10-15 ENCOUNTER — APPOINTMENT (OUTPATIENT)
Dept: CT IMAGING | Facility: HOSPITAL | Age: 89
End: 2024-10-15
Payer: MEDICARE

## 2024-10-15 ENCOUNTER — HOSPITAL ENCOUNTER (EMERGENCY)
Facility: HOSPITAL | Age: 89
Discharge: HOME | End: 2024-10-15
Attending: EMERGENCY MEDICINE | Admitting: EMERGENCY MEDICINE
Payer: MEDICARE

## 2024-10-15 VITALS
HEART RATE: 67 BPM | DIASTOLIC BLOOD PRESSURE: 76 MMHG | SYSTOLIC BLOOD PRESSURE: 169 MMHG | OXYGEN SATURATION: 99 % | HEIGHT: 59 IN | WEIGHT: 100.53 LBS | BODY MASS INDEX: 20.27 KG/M2 | RESPIRATION RATE: 14 BRPM | TEMPERATURE: 97.7 F

## 2024-10-15 DIAGNOSIS — S43.402A SPRAIN OF LEFT SHOULDER, UNSPECIFIED SHOULDER SPRAIN TYPE, INITIAL ENCOUNTER: Primary | ICD-10-CM

## 2024-10-15 DIAGNOSIS — S09.90XA INJURY OF HEAD, INITIAL ENCOUNTER: ICD-10-CM

## 2024-10-15 PROCEDURE — 72125 CT NECK SPINE W/O DYE: CPT

## 2024-10-15 PROCEDURE — 70450 CT HEAD/BRAIN W/O DYE: CPT

## 2024-10-15 PROCEDURE — 99284 EMERGENCY DEPT VISIT MOD MDM: CPT

## 2024-10-15 PROCEDURE — 73060 X-RAY EXAM OF HUMERUS: CPT

## 2024-10-15 PROCEDURE — 73562 X-RAY EXAM OF KNEE 3: CPT

## 2024-10-15 PROCEDURE — 73030 X-RAY EXAM OF SHOULDER: CPT

## 2024-10-15 NOTE — ED PROVIDER NOTES
Subjective   History of Present Illness  Chief complaint: Patient is an 89-year-old who was going up the stairs and she tripped stumbling and landed on her left shoulder and upper arm.  She did not lose consciousness.  She did strike her head but did not lose consciousness.  She is not on any blood thinners.  She struck her left knee as well.  She did get an injection into that knee yesterday for chronic pain.  She had a mechanical fall.  She did not have any syncope.  She never had chest pain or shortness of breath or abdominal pain.  She has not after the fall either.    Context:    Duration:    Timing:    Severity:    Associated Symptoms:        PCP:  LMP:      Review of Systems   Respiratory:  Negative for shortness of breath.    Cardiovascular:  Negative for chest pain.   Gastrointestinal:  Negative for abdominal pain.   Musculoskeletal:  Positive for arthralgias. Negative for back pain.   Neurological:  Negative for syncope, weakness and numbness.       Past Medical History:   Diagnosis Date    Disease of thyroid gland     Hyperlipidemia     Hypertension     Myocardial infarction 2009       Allergies   Allergen Reactions    Adhesive Tape Itching     While wearing holter monitor itching to site where adhesive is  to skin    Penicillin G Rash    Prednisone Nausea And Vomiting    Latex Rash       Past Surgical History:   Procedure Laterality Date    CARDIAC SURGERY      EYE SURGERY      QUADRICEPS TENDON REPAIR Right 4/6/2024    Procedure: SUTURE OF QUADRICEPS MUSCLE RUPTURE;  Surgeon: Tesfaye Lucero II, MD;  Location: [facility] MAIN OR;  Service: Orthopedics;  Laterality: Right;       No family history on file.    Social History     Socioeconomic History    Marital status:    Tobacco Use    Smoking status: Never     Passive exposure: Never    Smokeless tobacco: Never   Vaping Use    Vaping status: Never Used   Substance and Sexual Activity    Alcohol use: No    Drug use: Defer    Sexual  activity: Defer           Objective   Physical Exam  Vitals and nursing note reviewed.   Constitutional:       Appearance: Normal appearance.   HENT:      Head: Normocephalic and atraumatic.   Eyes:      Pupils: Pupils are equal, round, and reactive to light.   Cardiovascular:      Rate and Rhythm: Normal rate.   Pulmonary:      Effort: Pulmonary effort is normal.      Breath sounds: Normal breath sounds.   Abdominal:      Tenderness: There is no abdominal tenderness.   Musculoskeletal:      Left shoulder: Tenderness and bony tenderness present. No swelling, deformity or laceration. Decreased range of motion. Normal strength. Normal pulse.        Arms:       Cervical back: No rigidity.        Legs:       Comments: Tenderness medially.  There is bruising to the injection site from yesterday.  No redness.  Joint is stable.  Vastly intact distally.   Skin:     General: Skin is warm and dry.   Neurological:      General: No focal deficit present.      Mental Status: She is alert and oriented to person, place, and time.   Psychiatric:         Mood and Affect: Mood normal.         Thought Content: Thought content normal.         Procedures           ED Course                                 CT Head Without Contrast    Result Date: 10/15/2024  Impression: No acute intracranial abnormality. Chronic findings as above. Degenerative changes of the cervical spine without acute abnormality. Electronically Signed: Danish Michael MD  10/15/2024 8:38 PM EDT  Workstation ID: [id number]    CT Cervical Spine Without Contrast    Result Date: 10/15/2024  Impression: No acute intracranial abnormality. Chronic findings as above. Degenerative changes of the cervical spine without acute abnormality. Electronically Signed: Danish Michael MD  10/15/2024 8:38 PM EDT  Workstation ID: [id number]    XR Knee 3 View Left    Result Date: 10/15/2024  Impression: No acute process identified. Electronically Signed: Andrei Hensley MD  10/15/2024 8:36 PM EDT   Workstation ID: [id number]    XR Shoulder 2+ View Left    Result Date: 10/15/2024  Impression: No acute abnormality of the left shoulder or left humerus. Electronically Signed: Danish Michael MD  10/15/2024 7:36 PM EDT  Workstation ID: [id number]    XR Humerus Left    Result Date: 10/15/2024  Impression: No acute abnormality of the left shoulder or left humerus. Electronically Signed: Danish Michael MD  10/15/2024 7:36 PM EDT  Workstation ID: [id number]     Results for orders placed or performed during the hospital encounter of 09/03/24   Comprehensive Metabolic Panel    Collection Time: 09/03/24  3:28 PM    Specimen: Arm, Right; Blood   Result Value Ref Range    Glucose 105 (H) 65 - 99 mg/dL    BUN 17 8 - 23 mg/dL    Creatinine 0.89 0.57 - 1.00 mg/dL    Sodium 138 136 - 145 mmol/L    Potassium 4.5 3.5 - 5.2 mmol/L    Chloride 103 98 - 107 mmol/L    CO2 22.0 22.0 - 29.0 mmol/L    Calcium 9.1 8.6 - 10.5 mg/dL    Total Protein 6.5 6.0 - 8.5 g/dL    Albumin 3.9 3.5 - 5.2 g/dL    ALT (SGPT) 24 1 - 33 U/L    AST (SGOT) 46 (H) 1 - 32 U/L    Alkaline Phosphatase 43 39 - 117 U/L    Total Bilirubin 0.2 0.0 - 1.2 mg/dL    Globulin 2.6 gm/dL    A/G Ratio 1.5 g/dL    BUN/Creatinine Ratio 19.1 7.0 - 25.0    Anion Gap 13.0 5.0 - 15.0 mmol/L    eGFR 62.8 >60.0 mL/min/1.73   Magnesium    Collection Time: 09/03/24  3:28 PM    Specimen: Arm, Right; Blood   Result Value Ref Range    Magnesium 2.3 1.6 - 2.4 mg/dL   CK    Collection Time: 09/03/24  3:28 PM    Specimen: Arm, Right; Blood   Result Value Ref Range    Creatine Kinase 86 20 - 180 U/L   TSH    Collection Time: 09/03/24  3:28 PM    Specimen: Arm, Right; Blood   Result Value Ref Range    TSH 9.540 (H) 0.270 - 4.200 uIU/mL   CBC Auto Differential    Collection Time: 09/03/24  3:28 PM    Specimen: Arm, Right; Blood   Result Value Ref Range    WBC 6.82 3.40 - 10.80 10*3/mm3    RBC 4.24 3.77 - 5.28 10*6/mm3    Hemoglobin 13.4 12.0 - 15.9 g/dL    Hematocrit 40.6 34.0 - 46.6 %    MCV 95.8 79.0  - 97.0 fL    MCH 31.6 26.6 - 33.0 pg    MCHC 33.0 31.5 - 35.7 g/dL    RDW 14.1 12.3 - 15.4 %    RDW-SD 49.6 37.0 - 54.0 fl    MPV 11.3 6.0 - 12.0 fL    Platelets 192 140 - 450 10*3/mm3    Neutrophil % 76.7 (H) 42.7 - 76.0 %    Lymphocyte % 13.0 (L) 19.6 - 45.3 %    Monocyte % 7.0 5.0 - 12.0 %    Eosinophil % 2.8 0.3 - 6.2 %    Basophil % 0.1 0.0 - 1.5 %    Immature Grans % 0.4 0.0 - 0.5 %    Neutrophils, Absolute 5.22 1.70 - 7.00 10*3/mm3    Lymphocytes, Absolute 0.89 0.70 - 3.10 10*3/mm3    Monocytes, Absolute 0.48 0.10 - 0.90 10*3/mm3    Eosinophils, Absolute 0.19 0.00 - 0.40 10*3/mm3    Basophils, Absolute 0.01 0.00 - 0.20 10*3/mm3    Immature Grans, Absolute 0.03 0.00 - 0.05 10*3/mm3    nRBC 0.0 0.0 - 0.2 /100 WBC               Medical Decision Making  Patient was seen evaluated for the above problem    Differential diagnosis includes but is not limited to shoulder sprain, shoulder dislocation, shoulder fracture, humerus fracture, intracerebral hemorrhage, head injury    Patient did describe mechanical fall.  CT scan brain and C-spine showed no hemorrhage and no fracture.  X-ray shoulder shows no dislocation or fracture.  Humerus is negative as well.  X-ray knee shows no fracture or dislocation.  She is neurovascular intact distally in extremities.  She was provided a splint and she sees a shoulder specialist and [facility].  She we will follow-up there.  I also provided another follow-up if she is unable to get in.  She verbalized understanding of findings.      Problems Addressed:  Injury of head, initial encounter: complicated acute illness or injury  Sprain of left shoulder, unspecified shoulder sprain type, initial encounter: complicated acute illness or injury    Amount and/or Complexity of Data Reviewed  Radiology: ordered and independent interpretation performed.     Details: Described and interpreted by myself as above        Final diagnoses:   None     Shoulder sprain  Head injury  Knee contusion  ED  Disposition  ED Disposition       None            No follow-up provider specified.       Medication List      No changes were made to your prescriptions during this visit.            Marcos Meraz,   10/15/24 2751

## 2024-12-13 ENCOUNTER — LAB (OUTPATIENT)
Dept: LAB | Facility: HOSPITAL | Age: 89
End: 2024-12-13
Payer: MEDICARE

## 2024-12-13 ENCOUNTER — TRANSCRIBE ORDERS (OUTPATIENT)
Dept: LAB | Facility: HOSPITAL | Age: 89
End: 2024-12-13
Payer: MEDICARE

## 2024-12-13 DIAGNOSIS — R79.89 HYPOURICEMIA: ICD-10-CM

## 2024-12-13 DIAGNOSIS — E03.9 PRIMARY HYPOTHYROIDISM: Primary | ICD-10-CM

## 2024-12-13 DIAGNOSIS — E03.9 PRIMARY HYPOTHYROIDISM: ICD-10-CM

## 2024-12-13 LAB
ALBUMIN SERPL-MCNC: 4.1 G/DL (ref 3.5–5.2)
ALBUMIN/GLOB SERPL: 1.4 G/DL
ALP SERPL-CCNC: 52 U/L (ref 39–117)
ALT SERPL W P-5'-P-CCNC: 8 U/L (ref 1–33)
ANION GAP SERPL CALCULATED.3IONS-SCNC: 11.1 MMOL/L (ref 5–15)
AST SERPL-CCNC: 17 U/L (ref 1–32)
BASOPHILS # BLD AUTO: 0.02 10*3/MM3 (ref 0–0.2)
BASOPHILS NFR BLD AUTO: 0.3 % (ref 0–1.5)
BILIRUB SERPL-MCNC: 0.2 MG/DL (ref 0–1.2)
BUN SERPL-MCNC: 27 MG/DL (ref 8–23)
BUN/CREAT SERPL: 24.5 (ref 7–25)
CALCIUM SPEC-SCNC: 9.5 MG/DL (ref 8.6–10.5)
CHLORIDE SERPL-SCNC: 103 MMOL/L (ref 98–107)
CO2 SERPL-SCNC: 26.9 MMOL/L (ref 22–29)
CREAT SERPL-MCNC: 1.1 MG/DL (ref 0.57–1)
DEPRECATED RDW RBC AUTO: 44 FL (ref 37–54)
EGFRCR SERPLBLD CKD-EPI 2021: 48.7 ML/MIN/1.73
EOSINOPHIL # BLD AUTO: 0.01 10*3/MM3 (ref 0–0.4)
EOSINOPHIL NFR BLD AUTO: 0.2 % (ref 0.3–6.2)
ERYTHROCYTE [DISTWIDTH] IN BLOOD BY AUTOMATED COUNT: 13.1 % (ref 12.3–15.4)
GLOBULIN UR ELPH-MCNC: 3 GM/DL
GLUCOSE SERPL-MCNC: 155 MG/DL (ref 65–99)
HCT VFR BLD AUTO: 36.6 % (ref 34–46.6)
HGB BLD-MCNC: 12.4 G/DL (ref 12–15.9)
IMM GRANULOCYTES # BLD AUTO: 0.02 10*3/MM3 (ref 0–0.05)
IMM GRANULOCYTES NFR BLD AUTO: 0.3 % (ref 0–0.5)
LYMPHOCYTES # BLD AUTO: 1.34 10*3/MM3 (ref 0.7–3.1)
LYMPHOCYTES NFR BLD AUTO: 20.4 % (ref 19.6–45.3)
MCH RBC QN AUTO: 31.6 PG (ref 26.6–33)
MCHC RBC AUTO-ENTMCNC: 33.9 G/DL (ref 31.5–35.7)
MCV RBC AUTO: 93.4 FL (ref 79–97)
MONOCYTES # BLD AUTO: 0.39 10*3/MM3 (ref 0.1–0.9)
MONOCYTES NFR BLD AUTO: 5.9 % (ref 5–12)
NEUTROPHILS NFR BLD AUTO: 4.79 10*3/MM3 (ref 1.7–7)
NEUTROPHILS NFR BLD AUTO: 72.9 % (ref 42.7–76)
NRBC BLD AUTO-RTO: 0 /100 WBC (ref 0–0.2)
PLATELET # BLD AUTO: 253 10*3/MM3 (ref 140–450)
PMV BLD AUTO: 12 FL (ref 6–12)
POTASSIUM SERPL-SCNC: 4 MMOL/L (ref 3.5–5.2)
PROT SERPL-MCNC: 7.1 G/DL (ref 6–8.5)
RBC # BLD AUTO: 3.92 10*6/MM3 (ref 3.77–5.28)
SODIUM SERPL-SCNC: 141 MMOL/L (ref 136–145)
TSH SERPL DL<=0.05 MIU/L-ACNC: 1.69 UIU/ML (ref 0.27–4.2)
WBC NRBC COR # BLD AUTO: 6.57 10*3/MM3 (ref 3.4–10.8)

## 2024-12-13 PROCEDURE — 80053 COMPREHEN METABOLIC PANEL: CPT

## 2024-12-13 PROCEDURE — 36415 COLL VENOUS BLD VENIPUNCTURE: CPT

## 2024-12-13 PROCEDURE — 85025 COMPLETE CBC W/AUTO DIFF WBC: CPT

## 2024-12-13 PROCEDURE — 84443 ASSAY THYROID STIM HORMONE: CPT

## 2025-01-02 ENCOUNTER — TELEPHONE (OUTPATIENT)
Dept: CARDIOLOGY | Facility: CLINIC | Age: 89
End: 2025-01-02
Payer: MEDICARE

## 2025-01-02 NOTE — TELEPHONE ENCOUNTER
Caller: Zaira Milton    Relationship to patient: Emergency Contact    Best call back number: [phone]    Patient is needing: PATIENTS DAUGHTER REQUESTING A BH VERBAL [email or web address] FOR TO COMPLETE

## 2025-01-19 NOTE — PROGRESS NOTES
Encounter Date:01/31/2025    Last seen-6/12/2024      Patient ID: Sol Milton is a 89 y.o. female.      Chief Complaint:  Status post stent  Hypertension  Hypothyroidism    History of present illness    Since I have last seen, the patient has been without any chest discomfort ,shortness of breath, palpitations, dizziness or syncope.  Denies having any headache ,abdominal pain ,nausea, vomiting , diarrhea constipation, loss of weight or loss of appetite.  Denies having any excessive bruising ,hematuria or blood in the stool.    Review of all systems negative except as indicated.    Reviewed ROS.      Assessment and Plan         ////////////////////////  History  -------------    status post stent to RCA for totally occluded vessel level in 10/2009.       History of proximal inferior wall hypokinesis with ejection fraction of 55-60%.  60% mid LAD distal to diagonal branch 50-60% mid circumflex 60% ostial marginal and 60% distal circumflex coronary artery disease.     Echocardiogram 8/30/2022-mild mitral regurgitation left atrial enlargement and normal left ventricular function.  Stress Cardiolite test-8/30/2022-normal     Echocardiogram-normal 9/22/2021.  Stress Cardiolite test-normal 9/22/2021      stress Cardiolite test 04/24/2018 showed mild apical infarction without ischemia     Holter monitor 12/5/2023  Basic rhythm is sinus with rates.  Between 53-83/min.  Occasional premature atrial contractions are seen.  Rare and short episodes of SVT were noted.  Occasional premature ventricular contractions are seen with rare couplets.  There were strips with probably baseline artifact and loose lead.  Unlikely asystole.  Patient was lying down.  Some chest pain but awake and able to provide symptoms.  No evidence for AV blocks was seen.  Suggest clinical correlation.  Patient was not having any clinical symptoms while she was wearing the monitor.     -hypothyroidism hypertension GERD and asthma     -Atrial  fibrillation-new onset at last visit.  Patient is in sinus rhythm today 9/22/2021     -allergy to penicillin     -family history of coronary artery disease.  -----------------    Plan  ------------  Dizziness-improved.    Status post stent  Patient is not having any angina pectoris or congestive heart failure.  EKG 12/11/2023 revealed sinus rhythm without ischemic changes.    Paroxysmal atrial fibrillation  Patient is maintaining sinus rhythm.  EKG 1/31/2025-sinus rhythm.    Chronic coronary artery disease-stable.  Patient was taken off Plavix and changed to baby aspirin by Dr. Cox primary care physician.     Hypertension  140/77.  Patient is on atenolol and amlodipine.    Hypothyroidism-continue levothyroxine    Dyslipidemia-continue Zetia    Medications were reviewed and updated.Follow-up in the office in 6 months.     Further plan will depend on patient's progress.     Reviewed and updated 1/31/2025.  ///////////             Diagnosis Plan   1. Status post angioplasty with stent        2. Essential hypertension, benign        3. Acquired hypothyroidism        4. Essential hypertension        5. Shortness of breath        6. Dizziness        7. Status post percutaneous transluminal coronary angioplasty        8. Chest discomfort        9. Coronary artery disease, non-occlusive        10. PAF (paroxysmal atrial fibrillation)        11. Mixed hyperlipidemia        LAB RESULTS (LAST 7 DAYS)    CBC        BMP        CMP         BNP        TROPONIN        CoAg        Creatinine Clearance  CrCl cannot be calculated (Patient's most recent lab result is older than the maximum 30 days allowed.).    ABG        Radiology  No radiology results for the last day                The following portions of the patient's history were reviewed and updated as appropriate: allergies, current medications, past family history, past medical history, past social history, past surgical history, and problem list.    Review of Systems    Constitutional: Negative for malaise/fatigue.   Cardiovascular:  Positive for leg swelling. Negative for chest pain, dyspnea on exertion and palpitations.   Respiratory:  Negative for cough and shortness of breath.    Gastrointestinal:  Negative for abdominal pain, nausea and vomiting.   Neurological:  Positive for weakness. Negative for dizziness, focal weakness, headaches, light-headedness and numbness.   All other systems reviewed and are negative.      Current Outpatient Medications:     amLODIPine (NORVASC) 10 MG tablet, Take 1 tablet by mouth Daily., Disp: , Rfl:     aspirin 81 MG EC tablet, Take 1 tablet by mouth Daily., Disp: 90 tablet, Rfl: 4    atenolol (TENORMIN) 25 MG tablet, Take 1 tablet by mouth Daily., Disp: , Rfl:     Cholecalciferol (VITAMIN D3) 5000 units capsule capsule, Take 1 capsule by mouth Daily., Disp: , Rfl:     coenzyme Q10 100 MG capsule, Take 1 capsule by mouth Daily., Disp: , Rfl:     ezetimibe (ZETIA) 10 MG tablet, Take 1 tablet by mouth Daily., Disp: , Rfl:     HYDROcodone-acetaminophen (NORCO) 5-325 MG per tablet, Take 1 tablet by mouth Every 4 (Four) Hours As Needed for Severe Pain. (Patient not taking: Reported on 6/12/2024), Disp: 50 tablet, Rfl: 0    levothyroxine (SYNTHROID, LEVOTHROID) 88 MCG tablet, Take 1 tablet by mouth Daily., Disp: , Rfl:     Loratadine 10 MG capsule, Take 1 capsule by mouth Daily. (Patient not taking: Reported on 6/12/2024), Disp: , Rfl:     ondansetron (Zofran) 4 MG tablet, Take 1 tablet by mouth Every 6 (Six) Hours As Needed for Nausea or Vomiting. (Patient not taking: Reported on 6/12/2024), Disp: 30 tablet, Rfl: 0    Premarin 0.625 MG/GM vaginal cream, Insert 0.5 g into the vagina 2 (Two) Times a Week., Disp: , Rfl:     raloxifene (EVISTA) 60 MG tablet, Take 1 tablet by mouth Daily., Disp: , Rfl:     Turmeric (QC TUMERIC COMPLEX PO), Take 1 capsule by mouth Daily., Disp: , Rfl:     Allergies   Allergen Reactions    Adhesive Tape Itching     While  wearing holter monitor itching to site where adhesive is  to skin    Penicillin G Rash    Prednisone Nausea And Vomiting    Latex Rash       No family history on file.    Past Surgical History:   Procedure Laterality Date    CARDIAC SURGERY      EYE SURGERY      QUADRICEPS TENDON REPAIR Right 4/6/2024    Procedure: SUTURE OF QUADRICEPS MUSCLE RUPTURE;  Surgeon: Tesfaye Lucero II, MD;  Location: [facility] OR;  Service: Orthopedics;  Laterality: Right;       Past Medical History:   Diagnosis Date    Disease of thyroid gland     Hyperlipidemia     Hypertension     Myocardial infarction 2009       No family history on file.    Social History     Socioeconomic History    Marital status:    Tobacco Use    Smoking status: Never     Passive exposure: Never    Smokeless tobacco: Never   Vaping Use    Vaping status: Never Used   Substance and Sexual Activity    Alcohol use: No    Drug use: Defer    Sexual activity: Defer           ECG 12 Lead    Date/Time: 1/31/2025 10:30 AM  Performed by: Valdo Atkins MD    Authorized by: Valdo Atkins MD  Comparison: compared with previous ECG   Similar to previous ECG  Comparison to previous ECG: Normal sinus rhythm 69/min normal axis normal intervals no ectopy no significant change from previous EKG.        Objective:       Physical Exam    There were no vitals taken for this visit.  The patient is alert, oriented and in no distress.    Vital signs as noted above.    Head and neck revealed no carotid bruits or jugular venous distension.  No thyromegaly or lymphadenopathy is present.    Lungs clear.  No wheezing.  Breath sounds are normal bilaterally.    Heart normal first and second heart sounds.  No murmur..  No pericardial rub is present.  No gallop is present.    Abdomen soft and nontender.  No organomegaly is present.    Extremities revealed good peripheral pulses without any pedal edema.    Skin warm and dry.    Musculoskeletal system is grossly  normal.    CNS grossly normal.    Reviewed and updated.

## 2025-01-31 ENCOUNTER — OFFICE VISIT (OUTPATIENT)
Dept: CARDIOLOGY | Facility: CLINIC | Age: 89
End: 2025-01-31
Payer: MEDICARE

## 2025-01-31 VITALS
SYSTOLIC BLOOD PRESSURE: 140 MMHG | BODY MASS INDEX: 19.76 KG/M2 | DIASTOLIC BLOOD PRESSURE: 77 MMHG | HEART RATE: 68 BPM | OXYGEN SATURATION: 99 % | HEIGHT: 59 IN | WEIGHT: 98 LBS

## 2025-01-31 DIAGNOSIS — R06.02 SHORTNESS OF BREATH: ICD-10-CM

## 2025-01-31 DIAGNOSIS — R07.89 CHEST DISCOMFORT: ICD-10-CM

## 2025-01-31 DIAGNOSIS — I10 ESSENTIAL HYPERTENSION: ICD-10-CM

## 2025-01-31 DIAGNOSIS — E03.9 ACQUIRED HYPOTHYROIDISM: ICD-10-CM

## 2025-01-31 DIAGNOSIS — Z98.61 STATUS POST PERCUTANEOUS TRANSLUMINAL CORONARY ANGIOPLASTY: ICD-10-CM

## 2025-01-31 DIAGNOSIS — E78.2 MIXED HYPERLIPIDEMIA: ICD-10-CM

## 2025-01-31 DIAGNOSIS — I10 ESSENTIAL HYPERTENSION, BENIGN: ICD-10-CM

## 2025-01-31 DIAGNOSIS — R42 DIZZINESS: ICD-10-CM

## 2025-01-31 DIAGNOSIS — I48.0 PAF (PAROXYSMAL ATRIAL FIBRILLATION): ICD-10-CM

## 2025-01-31 DIAGNOSIS — Z95.820 STATUS POST ANGIOPLASTY WITH STENT: Primary | ICD-10-CM

## 2025-01-31 DIAGNOSIS — I25.10 CORONARY ARTERY DISEASE, NON-OCCLUSIVE: ICD-10-CM

## 2025-03-20 NOTE — PROGRESS NOTES
Encounter Date:04/25/2023    Last seen 10/11/2022      Patient ID: Sol Milton is a 88 y.o. female.    Chief Complaint:  Status post stent  Hypertension  Hypothyroidism        History of Present Illness  Since I have last seen, the patient has been without any chest discomfort ,shortness of breath, palpitations, dizziness or syncope.  Denies having any headache ,abdominal pain ,nausea, vomiting , diarrhea constipation, loss of weight or loss of appetite.  Denies having any excessive bruising ,hematuria or blood in the stool.    Review of all systems negative except as indicated.    Reviewed ROS.  Assessment and Plan         ////////////////////////  Impression  -------------    status post stent to RCA for totally occluded vessel level in 10/2009.       History of proximal inferior wall hypokinesis with ejection fraction of 55-60%.  60% mid LAD distal to diagonal branch 50-60% mid circumflex 60% ostial marginal and 60% distal circumflex coronary artery disease.     Echocardiogram 8/30/2022-mild mitral regurgitation left atrial enlargement and normal left ventricular function.  Stress Cardiolite test-8/30/2022-normal     Echocardiogram-normal 9/22/2021.  Stress Cardiolite test-normal 9/22/2021      stress Cardiolite test 04/24/2018 showed mild apical infarction without ischemia     -hypothyroidism hypertension GERD and asthma     -Atrial fibrillation-new onset at last visit.  Patient is in sinus rhythm today 9/22/2021     -allergy to penicillin     -family history of coronary artery disease.  -----------------    Plan  ------------  Status post stent  Patient is not having any angina pectoris or congestive heart failure.  Showed sinus rhythm without ischemic changes-4/25/2023      Occasional paroxysmal atrial fibrillation with controlled ventricular response  Patient is maintaining sinus rhythm.     Chronic coronary artery disease-stable  Patient was taken off Plavix and changed to baby aspirin by Dr. Cox  Wound Care Supply Order Form    PATIENT  Name: MICHAEL KAMINSKI  MRN: [id number]   : 1955  Legal Sex: Male   Address: [address] [place] [address]  Home Phone:: [phone]   City: [facility] [address]   Work Phone: [phone]    PCP: Christophe Puckett MD  Cell Phone: [phone]     COVERAGE  PRIMARY INSURANCE   Payor: Medicare Plan: [employer]   Group Number:  Subscriber Name: Michael Kaminski   Subscriber : 1955     Subscriber ID: [id number] Claim Address  [email or web address]   [facility]. Rel. To Subscriber: Self     SECONDARY INSURANCE   Payor: [employer] Plan: [id number]   Group Number: [id number] Subscriber Name: Michael Kaminski   Subscriber : 1955     Subscriber ID: [id number] Claim Address  [email or web address]   [facility]. Rel. To Subscriber: Self       Wound Information:   Wound Sacrum Pressure Injury (Active)   Date First Assessed: 08/10/24   Present on Original Admission: Yes  Location: Sacrum  Level of Skin Injury: Full Thickness  Primary Wound Type: Pressure Injury  Initial Pressure Injury Stage: Stage 4  Retired Initial Pressure Injury Stage: Unstageable...      Assessments 2023  4:15 PM 3/20/2025 10:38 AM   Wound Image      Level of Skin Injury Assessment -- Full Thickness   Pressure Injury Stage Stage 2 Stage 4   Dressing Assessment Clean Intact;Drainage present   Dressing Activity Changed Changed   Dressing Changed On   -- 25   Wound Exudate Serosanguineous Moderate;Serous;No odor   Cleansing Agent Normal saline Normal saline   Wound Bed/Tissue Type Pink Granulated;Red   Wound Bed % Granulated -- 100 %   Periwound Condition Normal Pale;Moist   Wound Edge Approximated;Attached to wound bed Attached to wound bed;Well defined   Wound Status Unchanged Unchanged   Topical Agent Normal saline Zinc ointment;Collagen   Wound Dressing Foam with borders Alginate;Gauze (multiple);ABD dressing;Tape-fabric   Wound Protection -- Other (comment)   Wound Last Measured -- 25   Wound  Length (cm) 2 cm 3 cm   Wound Width (cm) 3 cm 2 cm   Wound Depth (cm) 0.1 cm 1.2 cm   Wound Surface Area (cm^2) 6 cm^2 6 cm^2   Wound Volume (cm^3) 0.6 cm^3 7.2 cm^3   PhotoTaken? -- Yes       Active Orders   Date Order Priority Status Authorizing Provider   03/20/25 1257 Complete Wound Care 2 Wounds Associated Routine Active Romberg, Michael S, MD     - Diagnosis:    Pressure injury (specify)     - Pressure Injury (specify):    Stage 4     - Wound location:    left ischium, sacrum     - Dressing change(s) to be done by:    Wound Care Team     - Dressing change(s) to be done by:    Patient/Family     - Dressing frequency:    Daily     - Apply Other (specify) to:    Daily     - Dressing change(s) to be done using:    Clean Technique     - Clean wound with:    Normal saline     - Protect periwound with:    Other (specify)     - Other (specify):    Desitin (zinc oxide) to each connie-wound     - Topical agents:    Collagen     - Dressing type:    Gauze     - Dressing type:    ABD dressing     - Dressing type:    Alginate     - Cover dressing:    Tape-fabric (e.g., Medipore)     - Specify order of application::    Desitin to each connie-wound. Collagen to each wound bed. Cover with alginate, gauze, an ABD pad, and secure with fabric tape.       Wound Ischial tuberosity Left Pressure Injury (Active)   Date First Assessed: 08/10/24   Present on Original Admission: Yes  Location: Ischial tuberosity  Laterality: Left  Level of Skin Injury: Full Thickness  Primary Wound Type: Pressure Injury  Initial Pressure Injury Stage: Stage 4  Wound Approximate Ag...      Assessments 10/5/2023 10:57 AM 3/20/2025 10:38 AM   Wound Image       Level of Skin Injury Assessment -- Full Thickness   Pressure Injury Stage -- Stage 4   Dressing Assessment -- Intact;Drainage present   Dressing Activity -- Changed   Dressing Changed On   -- 03/20/25   Wound Exudate None Moderate;Serous;No odor   Cleansing Agent Normal saline Normal saline   Wound  primary care physician.     Hypertension- 151/76   continue atenolol amlodipine.  Patient has not taken her blood pressure medications this morning.  Patient's blood pressure runs normal at home.     Hypothyroidism-continue levothyroxine     Medications were reviewed and updated.     Follow-up in the office in 6 months.     Further plan will depend on patient's progress.  ///////////                   Diagnosis Plan   1. Status post angioplasty with stent        2. Essential hypertension, benign        3. Mixed hyperlipidemia        4. Shortness of breath        5. Chest discomfort        6. PAF (paroxysmal atrial fibrillation)        7. Status post percutaneous transluminal coronary angioplasty        8. Coronary artery disease, non-occlusive        9. Acquired hypothyroidism        10. Essential hypertension        LAB RESULTS (LAST 7 DAYS)    CBC        BMP        CMP         BNP        TROPONIN        CoAg        Creatinine Clearance  CrCl cannot be calculated (Patient's most recent lab result is older than the maximum 30 days allowed.).    ABG        Radiology  No radiology results for the last day                The following portions of the patient's history were reviewed and updated as appropriate: allergies, current medications, past family history, past medical history, past social history, past surgical history and problem list.    Review of Systems   Constitutional: Negative for malaise/fatigue.   Cardiovascular: Negative for chest pain, dyspnea on exertion, leg swelling and palpitations.   Respiratory: Negative for cough and shortness of breath.    Gastrointestinal: Negative for abdominal pain, nausea and vomiting.   Neurological: Negative for dizziness, focal weakness, headaches, light-headedness and numbness.   All other systems reviewed and are negative.        Current Outpatient Medications:   •  amLODIPine (NORVASC) 10 MG tablet, , Disp: , Rfl:   •  aspirin 81 MG chewable tablet, Chew 81 mg Daily.,  Bed/Tissue Type Granulated;Red Granulated;Red   Wound Bed % Granulated 100 % 100 %   Periwound Condition Normal Pale;Moist   Wound Edge Well defined;Attached to wound bed Attached to wound bed;Well defined   Wound Status -- Unchanged   Topical Agent Hypochlorous acid (e.g. Vashe, Exsept) Zinc ointment;Collagen   Wound Dressing Gauze;ABD dressing;Tape-fabric Alginate;Gauze (multiple);ABD dressing;Tape-fabric   Wound Protection Other (comment) Other (comment)   Wound Last Measured 10/05/23 03/20/25   Wound Length (cm) 1.6 cm 7.5 cm   Wound Width (cm) 2.1 cm 3.5 cm   Wound Depth (cm) 0.1 cm 2.1 cm   Wound Surface Area (cm^2) 3.36 cm^2 26.25 cm^2   Wound Volume (cm^3) 0.336 cm^3 55.125 cm^3   PhotoTaken? Yes Yes       Active Orders   Date Order Priority Status Authorizing Provider   03/20/25 1257 Complete Wound Care 2 Wounds Associated Routine Active Romberg, Michael S, MD     - Diagnosis:    Pressure injury (specify)     - Pressure Injury (specify):    Stage 4     - Wound location:    left ischium, sacrum     - Dressing change(s) to be done by:    Wound Care Team     - Dressing change(s) to be done by:    Patient/Family     - Dressing frequency:    Daily     - Apply Other (specify) to:    Daily     - Dressing change(s) to be done using:    Clean Technique     - Clean wound with:    Normal saline     - Protect periwound with:    Other (specify)     - Other (specify):    Desitin (zinc oxide) to each connie-wound     - Topical agents:    Collagen     - Dressing type:    Gauze     - Dressing type:    ABD dressing     - Dressing type:    Alginate     - Cover dressing:    Tape-fabric (e.g., Medipore)     - Specify order of application::    Desitin to each connie-wound. Collagen to each wound bed. Cover with alginate, gauze, an ABD pad, and secure with fabric tape.   Wound Hip/trochanter Right (Active)   Date First Assessed/Time First Assessed: 03/20/25 1038   Location: Hip/trochanter  Laterality: Right  Level of Skin Injury:  Disp: , Rfl:   •  atenolol (TENORMIN) 25 MG tablet, , Disp: , Rfl:   •  Cholecalciferol (VITAMIN D3) 5000 units capsule capsule, VITAMIN D3 5000 UNIT CAPS, Disp: , Rfl:   •  clopidogrel (PLAVIX) 75 MG tablet, , Disp: , Rfl:   •  escitalopram (LEXAPRO) 10 MG tablet, Take 10 mg by mouth Daily., Disp: , Rfl:   •  ezetimibe (ZETIA) 10 MG tablet, , Disp: , Rfl:   •  hydrOXYzine (ATARAX) 10 MG tablet, TAKE 2 TABLETS BY MOUTH ONCE DAILY AT BEDTIME AS NEEDED FOR SLEEP. (PLEASE START WITH ONE TABLET AND INCREASE TO 2 TABLETS IF NEEDED), Disp: , Rfl:   •  levothyroxine (SYNTHROID, LEVOTHROID) 88 MCG tablet, SYNTHROID 88 MCG TABS, Disp: , Rfl:   •  Loratadine 10 MG capsule, CLARITIN 10 MG CAPS, Disp: , Rfl:   •  Multiple Vitamins-Minerals (MULTI VITAMIN/MINERALS) tablet, MULTI VITAMIN/MINERALS TABS, Disp: , Rfl:   •  ondansetron (ZOFRAN) 8 MG tablet, TAKE 1/2 TABLET BY MOUTH EVERY 4 HOURS FOR 10 DAYS AS NEEDED FOR NAUSEA, Disp: , Rfl:   •  raloxifene (EVISTA) 60 MG tablet, Take 1 tablet by mouth Daily., Disp: , Rfl:     Allergies   Allergen Reactions   • Adhesive Tape Itching     While wearing holter monitor itching to site where adhesive is  to skin   • Penicillin G Rash   • Prednisone Nausea And Vomiting       No family history on file.    Past Surgical History:   Procedure Laterality Date   • CARDIAC SURGERY     • EYE SURGERY         Past Medical History:   Diagnosis Date   • Disease of thyroid gland    • Hyperlipidemia    • Hypertension        No family history on file.    Social History     Socioeconomic History   • Marital status:    Tobacco Use   • Smoking status: Never   • Smokeless tobacco: Never   Vaping Use   • Vaping Use: Never used   Substance and Sexual Activity   • Alcohol use: No   • Drug use: Defer   • Sexual activity: Defer           ECG 12 Lead    Date/Time: 4/25/2023 11:23 AM  Performed by: Valdo Atkins MD  Authorized by: Valdo Atkins MD   Comparison: compared with previous ECG    Similar to previous ECG  Comparison to previous ECG: Normal sinus rhythm nonspecific ST-T wave changes normal axis normal intervals no ectopy 65/min no significant change from 8/8/2022.                Objective:       Physical Exam    There were no vitals taken for this visit.  The patient is alert, oriented and in no distress.    Vital signs as noted above.    Head and neck revealed no carotid bruits or jugular venous distension.  No thyromegaly or lymphadenopathy is present.    Lungs clear.  No wheezing.  Breath sounds are normal bilaterally.    Heart normal first and second heart sounds.  No murmur..  No pericardial rub is present.  No gallop is present.    Abdomen soft and nontender.  No organomegaly is present.    Extremities revealed good peripheral pulses without any pedal edema.    Skin warm and dry.    Musculoskeletal system is grossly normal.    CNS grossly normal.    Reviewed and updated.         Full Thickness      Assessments 3/20/2025 10:38 AM   Wound Image     Level of Skin Injury Assessment Full Thickness   Pressure Injury Stage Unstageable   Dressing Assessment Intact;Drainage present   Dressing Activity Changed   Dressing Changed On   03/20/25   Wound Exudate Small;Serous;No odor   Cleansing Agent Normal saline   Wound Bed/Tissue Type Yellow;Necrotic tissue, slough;Brown;Red   Wound Bed % Necrotic Tissue Slough 100 %   Periwound Condition Hyperpigmented   Wound Edge Attached to wound bed;Well defined   Topical Agent Povidone iodine   Wound Dressing Gauze (multiple);ABD dressing;Tape-fabric   Wound Protection Other (comment)   Wound Last Measured 03/20/25   Wound Length (cm) 3.4 cm   Wound Width (cm) 2.5 cm   Wound Depth (cm) 1 cm   Wound Surface Area (cm^2) 8.5 cm^2   Wound Volume (cm^3) 8.5 cm^3   PhotoTaken? Yes       Active Orders   Date Order Priority Status Authorizing Provider   03/20/25 [facility] Complete Wound Care Hip/trochanter Routine Active Romberg, Michael S, MD     - Diagnosis:    Pressure injury (specify)     - Pressure Injury (specify):    Unstageable     - Wound location:    right trochanter     - Dressing change(s) to be done by:    Wound Care Team     - Dressing change(s) to be done by:    Patient/Family     - Dressing frequency:    Daily     - Dressing change(s) to be done using:    Clean Technique     - Topical agents:    Betadine solution     - Dressing type:    Gauze (multiple)     - Cover dressing:    ABD pad     - Cover dressing:    Tape-fabric (e.g., Medipore)     - Specify order of application::    Betadine soaked gauze to the wound bed. Cover with dry gauze and an ABD pad. Secure with fabric tape.       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 No Patient Location: Home   Cleaning Kit needed? No Is Patient on Home Health? No     DRESSING WOUND # FREQUENCY OF CHANGE LENGTH OF NEED   Collagen, alginate, 4x4 nonsterile, nonwoven gauze (200 ct), 5x9 ABD pads, 2\" Medipore fabric tape (4 rolls) Left ischium daily 90 days   Collagen, alginate, 4x4 nonsterile, nonwoven gauze (200 ct), 5x9 ABD pads, 2\" Medipore fabric tape (4 rolls) Sacrum daily 90 days    4x4 nonsterile, nonwoven gauze (200 ct), 5x9 ABD pads, 2\" Medipore fabric tape (4 rolls) Right trochanter daily 90 days      COMPRESSION Ankle (cm) Calf (cm) Length from heel to knee (cm)   RIGHT       LEFT         Facility Name [facility]   Address [address] [place] [address]   Phone [phone]   Fax [phone]     Provider Name: Dr. M. Romberg NPI: [id number]     \"I certify that the above mentioned product(s) is/are medically necessary for this patient.  This form and any statement on my letterhead attached here to has been completed and/or reviewed by me.  The foregoing information is true, accurate, and complete.\"  To my knowledge this patient is not under the care of home health at this time.”    Physician Signature: Date:

## 2025-07-08 ENCOUNTER — TRANSCRIBE ORDERS (OUTPATIENT)
Dept: ADMINISTRATIVE | Facility: HOSPITAL | Age: 89
End: 2025-07-08
Payer: MEDICARE

## 2025-07-08 ENCOUNTER — LAB (OUTPATIENT)
Dept: LAB | Facility: HOSPITAL | Age: 89
End: 2025-07-08
Payer: MEDICARE

## 2025-07-08 DIAGNOSIS — R41.3 MEMORY LOSS: ICD-10-CM

## 2025-07-08 DIAGNOSIS — M54.50 LOW BACK PAIN, UNSPECIFIED BACK PAIN LATERALITY, UNSPECIFIED CHRONICITY, UNSPECIFIED WHETHER SCIATICA PRESENT: ICD-10-CM

## 2025-07-08 DIAGNOSIS — M85.80 SCLEROSTEOSIS: ICD-10-CM

## 2025-07-08 DIAGNOSIS — I10 ESSENTIAL HYPERTENSION, MALIGNANT: ICD-10-CM

## 2025-07-08 DIAGNOSIS — E78.81 LIPOID DERMATOARTHRITIS: ICD-10-CM

## 2025-07-08 DIAGNOSIS — I48.91 ATRIAL FIBRILLATION, UNSPECIFIED TYPE: ICD-10-CM

## 2025-07-08 DIAGNOSIS — M19.90 SENILE ARTHRITIS: ICD-10-CM

## 2025-07-08 DIAGNOSIS — R79.89 HYPOURICEMIA: ICD-10-CM

## 2025-07-08 DIAGNOSIS — G20.C IDIOPATHIC PARKINSONISM: ICD-10-CM

## 2025-07-08 DIAGNOSIS — E03.9 HYPOTHYROIDISM, UNSPECIFIED TYPE: Primary | ICD-10-CM

## 2025-07-08 DIAGNOSIS — I25.10 DISEASE OF CARDIOVASCULAR SYSTEM: ICD-10-CM

## 2025-07-08 DIAGNOSIS — E03.9 HYPOTHYROIDISM, UNSPECIFIED TYPE: ICD-10-CM

## 2025-07-08 LAB
25(OH)D3 SERPL-MCNC: 90.8 NG/ML (ref 30–100)
ALBUMIN SERPL-MCNC: 4.3 G/DL (ref 3.5–5.2)
ALBUMIN/GLOB SERPL: 1.7 G/DL
ALP SERPL-CCNC: 44 U/L (ref 39–117)
ALT SERPL W P-5'-P-CCNC: 10 U/L (ref 1–33)
ANION GAP SERPL CALCULATED.3IONS-SCNC: 10.7 MMOL/L (ref 5–15)
AST SERPL-CCNC: 29 U/L (ref 1–32)
BASOPHILS # BLD AUTO: 0.08 10*3/MM3 (ref 0–0.2)
BASOPHILS NFR BLD AUTO: 1 % (ref 0–1.5)
BILIRUB SERPL-MCNC: 0.5 MG/DL (ref 0–1.2)
BUN SERPL-MCNC: 30 MG/DL (ref 8–23)
BUN/CREAT SERPL: 28.6 (ref 7–25)
CALCIUM SPEC-SCNC: 9.7 MG/DL (ref 8.6–10.5)
CHLORIDE SERPL-SCNC: 104 MMOL/L (ref 98–107)
CHOLEST SERPL-MCNC: 181 MG/DL (ref 0–200)
CO2 SERPL-SCNC: 23.3 MMOL/L (ref 22–29)
CREAT SERPL-MCNC: 1.05 MG/DL (ref 0.57–1)
DEPRECATED RDW RBC AUTO: 51.5 FL (ref 37–54)
EGFRCR SERPLBLD CKD-EPI 2021: 51.2 ML/MIN/1.73
EOSINOPHIL # BLD AUTO: 0.23 10*3/MM3 (ref 0–0.4)
EOSINOPHIL NFR BLD AUTO: 3 % (ref 0.3–6.2)
ERYTHROCYTE [DISTWIDTH] IN BLOOD BY AUTOMATED COUNT: 14.7 % (ref 12.3–15.4)
GLOBULIN UR ELPH-MCNC: 2.6 GM/DL
GLUCOSE SERPL-MCNC: 95 MG/DL (ref 65–99)
HBA1C MFR BLD: 5.81 % (ref 4.8–5.6)
HCT VFR BLD AUTO: 39 % (ref 34–46.6)
HDLC SERPL-MCNC: 72 MG/DL (ref 40–60)
HGB BLD-MCNC: 12.2 G/DL (ref 12–15.9)
IMM GRANULOCYTES # BLD AUTO: 0.02 10*3/MM3 (ref 0–0.05)
IMM GRANULOCYTES NFR BLD AUTO: 0.3 % (ref 0–0.5)
LDLC SERPL CALC-MCNC: 90 MG/DL (ref 0–100)
LDLC/HDLC SERPL: 1.21 {RATIO}
LYMPHOCYTES # BLD AUTO: 2.17 10*3/MM3 (ref 0.7–3.1)
LYMPHOCYTES NFR BLD AUTO: 28.4 % (ref 19.6–45.3)
MCH RBC QN AUTO: 29.8 PG (ref 26.6–33)
MCHC RBC AUTO-ENTMCNC: 31.3 G/DL (ref 31.5–35.7)
MCV RBC AUTO: 95.1 FL (ref 79–97)
MONOCYTES # BLD AUTO: 0.59 10*3/MM3 (ref 0.1–0.9)
MONOCYTES NFR BLD AUTO: 7.7 % (ref 5–12)
NEUTROPHILS NFR BLD AUTO: 4.55 10*3/MM3 (ref 1.7–7)
NEUTROPHILS NFR BLD AUTO: 59.6 % (ref 42.7–76)
NRBC BLD AUTO-RTO: 0 /100 WBC (ref 0–0.2)
PLATELET # BLD AUTO: 219 10*3/MM3 (ref 140–450)
PMV BLD AUTO: 12.2 FL (ref 6–12)
POTASSIUM SERPL-SCNC: 4.7 MMOL/L (ref 3.5–5.2)
PROT SERPL-MCNC: 6.9 G/DL (ref 6–8.5)
RBC # BLD AUTO: 4.1 10*6/MM3 (ref 3.77–5.28)
SODIUM SERPL-SCNC: 138 MMOL/L (ref 136–145)
TRIGL SERPL-MCNC: 108 MG/DL (ref 0–150)
TSH SERPL DL<=0.05 MIU/L-ACNC: 4.28 UIU/ML (ref 0.27–4.2)
VLDLC SERPL-MCNC: 19 MG/DL (ref 5–40)
WBC NRBC COR # BLD AUTO: 7.64 10*3/MM3 (ref 3.4–10.8)

## 2025-07-08 PROCEDURE — 84443 ASSAY THYROID STIM HORMONE: CPT

## 2025-07-08 PROCEDURE — 36415 COLL VENOUS BLD VENIPUNCTURE: CPT

## 2025-07-08 PROCEDURE — 83036 HEMOGLOBIN GLYCOSYLATED A1C: CPT

## 2025-07-08 PROCEDURE — 85025 COMPLETE CBC W/AUTO DIFF WBC: CPT

## 2025-07-08 PROCEDURE — 80053 COMPREHEN METABOLIC PANEL: CPT

## 2025-07-08 PROCEDURE — 82306 VITAMIN D 25 HYDROXY: CPT

## 2025-07-08 PROCEDURE — 80061 LIPID PANEL: CPT

## 2025-07-31 ENCOUNTER — OFFICE VISIT (OUTPATIENT)
Dept: CARDIOLOGY | Facility: CLINIC | Age: 89
End: 2025-07-31
Payer: MEDICARE

## 2025-07-31 VITALS
HEIGHT: 59 IN | SYSTOLIC BLOOD PRESSURE: 148 MMHG | HEART RATE: 81 BPM | DIASTOLIC BLOOD PRESSURE: 77 MMHG | WEIGHT: 101 LBS | OXYGEN SATURATION: 98 % | BODY MASS INDEX: 20.36 KG/M2

## 2025-07-31 DIAGNOSIS — I10 ESSENTIAL HYPERTENSION: ICD-10-CM

## 2025-07-31 DIAGNOSIS — I25.10 CORONARY ARTERY DISEASE, NON-OCCLUSIVE: ICD-10-CM

## 2025-07-31 DIAGNOSIS — Z95.820 STATUS POST ANGIOPLASTY WITH STENT: Primary | ICD-10-CM

## 2025-07-31 DIAGNOSIS — E03.9 ACQUIRED HYPOTHYROIDISM: ICD-10-CM

## 2025-07-31 DIAGNOSIS — I10 ESSENTIAL HYPERTENSION, BENIGN: ICD-10-CM

## 2025-07-31 DIAGNOSIS — R07.89 CHEST DISCOMFORT: ICD-10-CM

## 2025-07-31 DIAGNOSIS — R42 DIZZINESS: ICD-10-CM

## 2025-07-31 DIAGNOSIS — Z98.61 STATUS POST PERCUTANEOUS TRANSLUMINAL CORONARY ANGIOPLASTY: ICD-10-CM

## 2025-07-31 DIAGNOSIS — R06.02 SHORTNESS OF BREATH: ICD-10-CM

## 2025-07-31 RX ORDER — MELOXICAM 7.5 MG/1
TABLET ORAL
COMMUNITY
Start: 2025-02-13

## 2025-07-31 RX ORDER — DOCUSATE SODIUM 100 MG/1
100 CAPSULE, LIQUID FILLED ORAL DAILY
COMMUNITY

## 2025-07-31 RX ORDER — CARBIDOPA AND LEVODOPA 25; 100 MG/1; MG/1
1 TABLET, EXTENDED RELEASE ORAL 3 TIMES DAILY
COMMUNITY
Start: 2025-05-25

## 2025-07-31 RX ORDER — PANTOPRAZOLE SODIUM 40 MG/1
40 TABLET, DELAYED RELEASE ORAL DAILY
COMMUNITY
Start: 2025-02-13

## 2025-07-31 NOTE — PROGRESS NOTES
Encounter Date:07/31/2025  Last seen 1/31/2025.      Patient ID: Sol Milton is a 89 y.o. female.    Chief Complaint:  Status post stent  Hypertension  Hypothyroidism.    History of present illness     Since I have last seen, the patient has been without any chest discomfort ,shortness of breath, palpitations, dizziness or syncope.  Denies having any headache ,abdominal pain ,nausea, vomiting , diarrhea constipation, loss of weight or loss of appetite.  Denies having any excessive bruising ,hematuria or blood in the stool.    Review of all systems negative except as indicated.    Reviewed ROS.     Assessment and Plan         ////////////////////////  History  -------------    status post stent to RCA for totally occluded vessel level in 10/2009.       History of proximal inferior wall hypokinesis with ejection fraction of 55-60%.  60% mid LAD distal to diagonal branch 50-60% mid circumflex 60% ostial marginal and 60% distal circumflex coronary artery disease.     Echocardiogram 8/30/2022-mild mitral regurgitation left atrial enlargement and normal left ventricular function.  Stress Cardiolite test-8/30/2022-normal     Echocardiogram-normal 9/22/2021.  Stress Cardiolite test-normal 9/22/2021      stress Cardiolite test 04/24/2018 showed mild apical infarction without ischemia     Holter monitor 12/5/2023  Basic rhythm is sinus with rates.  Between 53-83/min.  Occasional premature atrial contractions are seen.  Rare and short episodes of SVT were noted.  Occasional premature ventricular contractions are seen with rare couplets.  There were strips with probably baseline artifact and loose lead.  Unlikely asystole.  Patient was lying down.  Some chest pain but awake and able to provide symptoms.  No evidence for AV blocks was seen.  Suggest clinical correlation.  Patient was not having any clinical symptoms while she was wearing the monitor.     -hypothyroidism hypertension GERD and asthma     -Atrial  fibrillation-new onset at last visit.  Patient is in sinus rhythm today 9/22/2021     -allergy to penicillin     -family history of coronary artery disease.  -----------------    Plan  ------------  Status post stent  Patient is not having any angina pectoris or congestive heart failure.    EKG 12/11/2023 revealed sinus rhythm without ischemic changes.     Paroxysmal atrial fibrillation  Patient is maintaining sinus rhythm.  EKG 1/31/2025-sinus rhythm.  EKG 7/31/2025-sinus rhythm with premature ventricular contractions.     Chronic coronary artery disease    Antiplatelet therapy was reviewed.  Patient is off Plavix.  Patient is on baby aspirin.    Hypertension  148/77.  Maintain blood pressure log.  Patient is on atenolol and amlodipine.     Hypothyroidism-continue levothyroxine    Dyslipidemia-continue Zetia.    Medications were reviewed and updated.    Follow-up in the office in 6 months.    Further plan will depend on patient's progress.    Reviewed and updated 7/31/2025.    ///////////            Diagnosis Plan   1. Status post angioplasty with stent        2. Essential hypertension        3. Status post percutaneous transluminal coronary angioplasty        4. Essential hypertension, benign        5. Shortness of breath        6. Chest discomfort        7. Acquired hypothyroidism        8. Dizziness        9. Coronary artery disease, non-occlusive        LAB RESULTS (LAST 7 DAYS)    CBC        BMP        CMP         BNP        TROPONIN        CoAg        Creatinine Clearance  Estimated Creatinine Clearance: 26.8 mL/min (A) (by C-G formula based on SCr of 1.05 mg/dL (H)).    ABG        Radiology  No radiology results for the last day                The following portions of the patient's history were reviewed and updated as appropriate: allergies, current medications, past family history, past medical history, past social history, past surgical history, and problem list.    Review of Systems   Constitutional:  Negative for malaise/fatigue.   Cardiovascular:  Positive for chest pain. Negative for leg swelling, palpitations and syncope.   Respiratory:  Negative for shortness of breath.    Skin:  Negative for rash.   Gastrointestinal:  Negative for nausea and vomiting.   Neurological:  Negative for dizziness, light-headedness and numbness.   All other systems reviewed and are negative.        Current Outpatient Medications:     amLODIPine (NORVASC) 10 MG tablet, Take 1 tablet by mouth Daily., Disp: , Rfl:     aspirin 81 MG EC tablet, Take 1 tablet by mouth Daily., Disp: 90 tablet, Rfl: 4    atenolol (TENORMIN) 25 MG tablet, Take 1 tablet by mouth Daily., Disp: , Rfl:     carbidopa-levodopa ER (SINEMET CR)  MG per tablet, Take 1 tablet by mouth 3 (Three) Times a Day., Disp: , Rfl:     Cholecalciferol (VITAMIN D3) 5000 units capsule capsule, Take 1 capsule by mouth Daily., Disp: , Rfl:     coenzyme Q10 100 MG capsule, Take 1 capsule by mouth Daily., Disp: , Rfl:     docusate sodium (Colace) 100 MG capsule, Take 1 capsule by mouth Daily., Disp: , Rfl:     ezetimibe (ZETIA) 10 MG tablet, Take 1 tablet by mouth Daily., Disp: , Rfl:     levothyroxine (SYNTHROID, LEVOTHROID) 88 MCG tablet, Take 1 tablet by mouth Daily., Disp: , Rfl:     meloxicam (MOBIC) 7.5 MG tablet, TAKE 1 TABLET BY MOUTH ONCE DAILY FOR ARTHRITIS FOR 90 DAYS, Disp: , Rfl:     pantoprazole (PROTONIX) 40 MG EC tablet, Take 1 tablet by mouth Daily., Disp: , Rfl:     Premarin 0.625 MG/GM vaginal cream, Insert 0.5 g into the vagina 2 (Two) Times a Week., Disp: , Rfl:     Turmeric (QC TUMERIC COMPLEX PO), Take 1 capsule by mouth Daily., Disp: , Rfl:     HYDROcodone-acetaminophen (NORCO) 5-325 MG per tablet, Take 1 tablet by mouth Every 4 (Four) Hours As Needed for Severe Pain. (Patient not taking: Reported on 6/12/2024), Disp: 50 tablet, Rfl: 0    Loratadine 10 MG capsule, Take 1 capsule by mouth Daily. (Patient not taking: Reported on 6/12/2024), Disp: , Rfl:  "    ondansetron (Zofran) 4 MG tablet, Take 1 tablet by mouth Every 6 (Six) Hours As Needed for Nausea or Vomiting. (Patient not taking: Reported on 6/12/2024), Disp: 30 tablet, Rfl: 0    raloxifene (EVISTA) 60 MG tablet, Take 1 tablet by mouth Daily. (Patient not taking: Reported on 7/31/2025), Disp: , Rfl:     Allergies   Allergen Reactions    Adhesive Tape Itching     While wearing holter monitor itching to site where adhesive is  to skin    Penicillin G Rash    Prednisone Nausea And Vomiting    Latex Rash       No family history on file.    Past Surgical History:   Procedure Laterality Date    CARDIAC SURGERY      EYE SURGERY      QUADRICEPS TENDON REPAIR Right 4/6/2024    Procedure: SUTURE OF QUADRICEPS MUSCLE RUPTURE;  Surgeon: Tesfaye Lucero II, MD;  Location: [facility] OR;  Service: Orthopedics;  Laterality: Right;       Past Medical History:   Diagnosis Date    Disease of thyroid gland     Hyperlipidemia     Hypertension     Myocardial infarction 2009    Parkinson's disease        No family history on file.    Social History     Socioeconomic History    Marital status:    Tobacco Use    Smoking status: Never     Passive exposure: Never    Smokeless tobacco: Never   Vaping Use    Vaping status: Never Used   Substance and Sexual Activity    Alcohol use: No    Drug use: Defer    Sexual activity: Defer           ECG 12 Lead    Date/Time: 7/31/2025 2:11 PM  Performed by: Valdo Atkins MD    Authorized by: Valdo Atkins MD  Comparison: compared with previous ECG   Similar to previous ECG  Comparison to previous ECG: Normal sinus rhythm premature ventricular contractions normal axis normal intervals nonspecific ST-T wave abnormalities.  No significant change from previous EKG.        Objective:       Physical Exam    /77 (BP Location: Right arm, Patient Position: Sitting, Cuff Size: Adult)   Pulse 81   Ht 149.9 cm (59\")   Wt 45.8 kg (101 lb)   SpO2 98%   BMI 20.40 kg/m² "   The patient is alert, oriented and in no distress.    Vital signs as noted above.    Head and neck revealed no carotid bruits or jugular venous distension.  No thyromegaly or lymphadenopathy is present.    Lungs clear.  No wheezing.  Breath sounds are normal bilaterally.    Heart normal first and second heart sounds.  No murmur..  No pericardial rub is present.  No gallop is present.    Abdomen soft and nontender.  No organomegaly is present.    Extremities revealed good peripheral pulses without any pedal edema.    Skin warm and dry.    Musculoskeletal system is grossly normal.    CNS grossly normal.    Reviewed and updated

## (undated) DEVICE — GLV SURG SENSICARE PI ORTHO PF SZ7 LF STRL

## (undated) DEVICE — HEWSON SUTURE RETRIEVER: Brand: HEWSON SUTURE RETRIEVER

## (undated) DEVICE — ZIP 16 SURGICAL SKIN CLOSURE DEVICE, PSA: Brand: ZIP 16 SURGICAL SKIN CLOSURE DEVICE

## (undated) DEVICE — BNDG,ELSTC,MATRIX,STRL,6"X5YD,LF,HOOK&LP: Brand: MEDLINE

## (undated) DEVICE — PROXIMATE RH ROTATING HEAD SKIN STAPLERS (35 WIDE) CONTAINS 35 STAINLESS STEEL STAPLES: Brand: PROXIMATE

## (undated) DEVICE — DRSNG GZ CURAD XEROFORM NONADHR OVERWRAP 5X9IN

## (undated) DEVICE — SOL ISO/ALC 70PCT 4OZ

## (undated) DEVICE — GLV SURG SENSICARE PI LF PF 8 GRN STRL

## (undated) DEVICE — T-DRAPE,EXTREMITY,STERILE: Brand: MEDLINE

## (undated) DEVICE — SPNG GZ WOVN 4X4IN 12PLY 10/BX STRL

## (undated) DEVICE — GLV SURG SENSICARE PI ORTHO SZ8.5 LF STRL

## (undated) DEVICE — SUT VIC 2/0 CT1 36IN

## (undated) DEVICE — TBG PENCL TELESCP MEGADYNE SMOKE EVAC 10FT

## (undated) DEVICE — UNDRGLV SURG BIOGEL PUNCTUREINDICATION SZ7 PF STRL

## (undated) DEVICE — PATIENT RETURN ELECTRODE, SINGLE-USE, CONTACT QUALITY MONITORING, ADULT, WITH 9FT CORD, FOR PATIENTS WEIGING OVER 33LBS. (15KG): Brand: MEGADYNE

## (undated) DEVICE — BANDAGE,ELASTIC,ESMARK,STERILE,6"X9',LF: Brand: MEDLINE

## (undated) DEVICE — DRAPE,U/ SHT,SPLIT,PLAS,STERIL: Brand: MEDLINE

## (undated) DEVICE — ANTIBACTERIAL UNDYED BRAIDED (POLYGLACTIN 910), SYNTHETIC ABSORBABLE SUTURE: Brand: COATED VICRYL

## (undated) DEVICE — GLV SURG SIGNATURE ESSENTIAL PF LTX SZ8.5

## (undated) DEVICE — PK ORTHO MAJ 40

## (undated) DEVICE — KT SURG TURNOVER 050

## (undated) DEVICE — SUT ETHILON 3/0 30IN BLK

## (undated) DEVICE — APPL CHLORAPREP HI/LITE 26ML ORNG

## (undated) DEVICE — SUT VIC 1 CT 36IN J959H

## (undated) DEVICE — GLV SURG PREMIERPRO ORTHO LTX PF SZ8.5 BRN

## (undated) DEVICE — UNDYED BRAIDED (POLYGLACTIN 910), SYNTHETIC ABSORBABLE SUTURE: Brand: COATED VICRYL

## (undated) DEVICE — TOWEL,OR,DSP,ST,BLUE,STD,4/PK,20PK/CS: Brand: MEDLINE

## (undated) DEVICE — GOWN,REINFORCE,POLY,SIRUS,BREATH SLV,XLG: Brand: MEDLINE

## (undated) DEVICE — PAD, GROUNDING, UNIVERSAL, SPLIT, 9': Brand: MEDLINE

## (undated) DEVICE — PK EXTREM 50

## (undated) DEVICE — SOL ISO/ALC RUB 70PCT 4OZ

## (undated) DEVICE — DISPOSABLE TOURNIQUET CUFF 34"X4", 1-LINE, BLUE, STERILE, 1EA/PK, 10PK/CS: Brand: ASP MEDICAL

## (undated) DEVICE — PADDING,UNDERCAST,COTTON, 4"X4YD STERILE: Brand: MEDLINE

## (undated) DEVICE — DISPOSABLE TOURNIQUET CUFF SINGLE BLADDER, SINGLE PORT AND QUICK CONNECT CONNECTOR: Brand: COLOR CUFF

## (undated) DEVICE — BNDG ESMARK STRL 6INX12FT LF

## (undated) DEVICE — BIT DRL QC DIA 2.5X110MM

## (undated) DEVICE — TRAP FLD MINIVAC MEGADYNE 100ML